# Patient Record
Sex: MALE | Race: WHITE | NOT HISPANIC OR LATINO | Employment: OTHER | URBAN - METROPOLITAN AREA
[De-identification: names, ages, dates, MRNs, and addresses within clinical notes are randomized per-mention and may not be internally consistent; named-entity substitution may affect disease eponyms.]

---

## 2021-01-13 ENCOUNTER — HOSPITAL ENCOUNTER (EMERGENCY)
Facility: HOSPITAL | Age: 71
Discharge: HOME | End: 2021-01-14
Attending: STUDENT IN AN ORGANIZED HEALTH CARE EDUCATION/TRAINING PROGRAM
Payer: MEDICARE

## 2021-01-13 DIAGNOSIS — R60.0 PERIPHERAL EDEMA: ICD-10-CM

## 2021-01-13 DIAGNOSIS — R06.02 SOB (SHORTNESS OF BREATH): ICD-10-CM

## 2021-01-13 DIAGNOSIS — J44.1 COPD EXACERBATION: Primary | ICD-10-CM

## 2021-01-13 LAB
ALBUMIN SERPL BCP-MCNC: 3.4 G/DL (ref 3.5–5.2)
ALP SERPL-CCNC: 104 U/L (ref 55–135)
ALT SERPL W/O P-5'-P-CCNC: 33 U/L (ref 10–44)
ANION GAP SERPL CALC-SCNC: 11 MMOL/L (ref 8–16)
AST SERPL-CCNC: 37 U/L (ref 10–40)
BASOPHILS # BLD AUTO: 0.03 K/UL (ref 0–0.2)
BASOPHILS NFR BLD: 0.5 % (ref 0–1.9)
BILIRUB SERPL-MCNC: 0.5 MG/DL (ref 0.1–1)
BNP SERPL-MCNC: 44 PG/ML (ref 0–99)
BUN SERPL-MCNC: 18 MG/DL (ref 8–23)
CALCIUM SERPL-MCNC: 9 MG/DL (ref 8.7–10.5)
CHLORIDE SERPL-SCNC: 106 MMOL/L (ref 95–110)
CO2 SERPL-SCNC: 24 MMOL/L (ref 23–29)
CREAT SERPL-MCNC: 1.1 MG/DL (ref 0.5–1.4)
D DIMER PPP IA.FEU-MCNC: 0.92 MG/L FEU
DIFFERENTIAL METHOD: ABNORMAL
EOSINOPHIL # BLD AUTO: 0.1 K/UL (ref 0–0.5)
EOSINOPHIL NFR BLD: 1.4 % (ref 0–8)
ERYTHROCYTE [DISTWIDTH] IN BLOOD BY AUTOMATED COUNT: 15.3 % (ref 11.5–14.5)
EST. GFR  (AFRICAN AMERICAN): >60 ML/MIN/1.73 M^2
EST. GFR  (NON AFRICAN AMERICAN): >60 ML/MIN/1.73 M^2
GLUCOSE SERPL-MCNC: 101 MG/DL (ref 70–110)
HCT VFR BLD AUTO: 42.1 % (ref 40–54)
HCV AB SERPL QL IA: NEGATIVE
HGB BLD-MCNC: 13.8 G/DL (ref 14–18)
IMM GRANULOCYTES # BLD AUTO: 0.03 K/UL (ref 0–0.04)
IMM GRANULOCYTES NFR BLD AUTO: 0.5 % (ref 0–0.5)
INR PPP: 1 (ref 0.8–1.2)
LYMPHOCYTES # BLD AUTO: 1.3 K/UL (ref 1–4.8)
LYMPHOCYTES NFR BLD: 23.6 % (ref 18–48)
MCH RBC QN AUTO: 29 PG (ref 27–31)
MCHC RBC AUTO-ENTMCNC: 32.8 G/DL (ref 32–36)
MCV RBC AUTO: 88 FL (ref 82–98)
MONOCYTES # BLD AUTO: 0.7 K/UL (ref 0.3–1)
MONOCYTES NFR BLD: 13 % (ref 4–15)
NEUTROPHILS # BLD AUTO: 3.5 K/UL (ref 1.8–7.7)
NEUTROPHILS NFR BLD: 61 % (ref 38–73)
NRBC BLD-RTO: 0 /100 WBC
PLATELET # BLD AUTO: 168 K/UL (ref 150–350)
PMV BLD AUTO: 11.1 FL (ref 9.2–12.9)
POTASSIUM SERPL-SCNC: 4 MMOL/L (ref 3.5–5.1)
PROT SERPL-MCNC: 7.4 G/DL (ref 6–8.4)
PROTHROMBIN TIME: 10.9 SEC (ref 9–12.5)
RBC # BLD AUTO: 4.76 M/UL (ref 4.6–6.2)
SARS-COV-2 RDRP RESP QL NAA+PROBE: NEGATIVE
SODIUM SERPL-SCNC: 141 MMOL/L (ref 136–145)
TROPONIN I SERPL DL<=0.01 NG/ML-MCNC: 0.01 NG/ML (ref 0–0.03)
WBC # BLD AUTO: 5.69 K/UL (ref 3.9–12.7)

## 2021-01-13 PROCEDURE — 94640 AIRWAY INHALATION TREATMENT: CPT

## 2021-01-13 PROCEDURE — 63600175 PHARM REV CODE 636 W HCPCS: Performed by: STUDENT IN AN ORGANIZED HEALTH CARE EDUCATION/TRAINING PROGRAM

## 2021-01-13 PROCEDURE — 93010 EKG 12-LEAD: ICD-10-PCS | Mod: ,,, | Performed by: INTERNAL MEDICINE

## 2021-01-13 PROCEDURE — 93010 ELECTROCARDIOGRAM REPORT: CPT | Mod: ,,, | Performed by: INTERNAL MEDICINE

## 2021-01-13 PROCEDURE — 25000242 PHARM REV CODE 250 ALT 637 W/ HCPCS: Performed by: STUDENT IN AN ORGANIZED HEALTH CARE EDUCATION/TRAINING PROGRAM

## 2021-01-13 PROCEDURE — 86803 HEPATITIS C AB TEST: CPT

## 2021-01-13 PROCEDURE — 80053 COMPREHEN METABOLIC PANEL: CPT

## 2021-01-13 PROCEDURE — 85025 COMPLETE CBC W/AUTO DIFF WBC: CPT

## 2021-01-13 PROCEDURE — 93005 ELECTROCARDIOGRAM TRACING: CPT

## 2021-01-13 PROCEDURE — U0002 COVID-19 LAB TEST NON-CDC: HCPCS

## 2021-01-13 PROCEDURE — 99285 EMERGENCY DEPT VISIT HI MDM: CPT | Mod: 25

## 2021-01-13 PROCEDURE — 84484 ASSAY OF TROPONIN QUANT: CPT

## 2021-01-13 PROCEDURE — 96375 TX/PRO/DX INJ NEW DRUG ADDON: CPT | Mod: 59

## 2021-01-13 PROCEDURE — 85379 FIBRIN DEGRADATION QUANT: CPT

## 2021-01-13 PROCEDURE — 85610 PROTHROMBIN TIME: CPT

## 2021-01-13 PROCEDURE — 96374 THER/PROPH/DIAG INJ IV PUSH: CPT

## 2021-01-13 PROCEDURE — 25000003 PHARM REV CODE 250: Performed by: STUDENT IN AN ORGANIZED HEALTH CARE EDUCATION/TRAINING PROGRAM

## 2021-01-13 PROCEDURE — 83880 ASSAY OF NATRIURETIC PEPTIDE: CPT

## 2021-01-13 RX ORDER — DILTIAZEM HYDROCHLORIDE 5 MG/ML
20 INJECTION INTRAVENOUS
Status: COMPLETED | OUTPATIENT
Start: 2021-01-13 | End: 2021-01-13

## 2021-01-13 RX ORDER — FUROSEMIDE 10 MG/ML
80 INJECTION INTRAMUSCULAR; INTRAVENOUS
Status: COMPLETED | OUTPATIENT
Start: 2021-01-13 | End: 2021-01-13

## 2021-01-13 RX ORDER — METHYLPREDNISOLONE SOD SUCC 125 MG
125 VIAL (EA) INJECTION
Status: COMPLETED | OUTPATIENT
Start: 2021-01-13 | End: 2021-01-13

## 2021-01-13 RX ORDER — IPRATROPIUM BROMIDE AND ALBUTEROL SULFATE 2.5; .5 MG/3ML; MG/3ML
3 SOLUTION RESPIRATORY (INHALATION)
Status: COMPLETED | OUTPATIENT
Start: 2021-01-13 | End: 2021-01-13

## 2021-01-13 RX ADMIN — METHYLPREDNISOLONE SODIUM SUCCINATE 125 MG: 125 INJECTION, POWDER, FOR SOLUTION INTRAMUSCULAR; INTRAVENOUS at 08:01

## 2021-01-13 RX ADMIN — FUROSEMIDE 80 MG: 10 INJECTION, SOLUTION INTRAMUSCULAR; INTRAVENOUS at 08:01

## 2021-01-13 RX ADMIN — DILTIAZEM HYDROCHLORIDE 20 MG: 5 INJECTION INTRAVENOUS at 08:01

## 2021-01-13 RX ADMIN — IPRATROPIUM BROMIDE AND ALBUTEROL SULFATE 3 ML: .5; 3 SOLUTION RESPIRATORY (INHALATION) at 08:01

## 2021-01-14 VITALS
SYSTOLIC BLOOD PRESSURE: 114 MMHG | BODY MASS INDEX: 36.77 KG/M2 | HEIGHT: 72 IN | RESPIRATION RATE: 18 BRPM | HEART RATE: 80 BPM | DIASTOLIC BLOOD PRESSURE: 70 MMHG | TEMPERATURE: 98 F | WEIGHT: 271.5 LBS | OXYGEN SATURATION: 95 %

## 2021-01-14 PROCEDURE — 25500020 PHARM REV CODE 255: Performed by: STUDENT IN AN ORGANIZED HEALTH CARE EDUCATION/TRAINING PROGRAM

## 2021-01-14 RX ORDER — AZITHROMYCIN 250 MG/1
250 TABLET, FILM COATED ORAL DAILY
Qty: 6 TABLET | Refills: 0 | Status: SHIPPED | OUTPATIENT
Start: 2021-01-14 | End: 2021-09-15

## 2021-01-14 RX ORDER — FUROSEMIDE 20 MG/1
20 TABLET ORAL 2 TIMES DAILY
Qty: 60 TABLET | Refills: 2 | Status: SHIPPED | OUTPATIENT
Start: 2021-01-14 | End: 2021-09-15

## 2021-01-14 RX ADMIN — IOHEXOL 100 ML: 350 INJECTION, SOLUTION INTRAVENOUS at 12:01

## 2021-07-01 ENCOUNTER — HOSPITAL ENCOUNTER (OUTPATIENT)
Dept: RADIOLOGY | Facility: HOSPITAL | Age: 71
Discharge: HOME | End: 2021-07-01
Attending: SPECIALIST
Payer: MEDICARE

## 2021-07-01 DIAGNOSIS — J44.9 COPD (CHRONIC OBSTRUCTIVE PULMONARY DISEASE): ICD-10-CM

## 2021-07-01 PROCEDURE — 71046 XR CHEST PA AND LATERAL: ICD-10-PCS | Mod: 26,,, | Performed by: RADIOLOGY

## 2021-07-01 PROCEDURE — 71046 X-RAY EXAM CHEST 2 VIEWS: CPT | Mod: TC

## 2021-07-01 PROCEDURE — 71046 X-RAY EXAM CHEST 2 VIEWS: CPT | Mod: 26,,, | Performed by: RADIOLOGY

## 2021-08-31 ENCOUNTER — HOSPITAL ENCOUNTER (EMERGENCY)
Facility: HOSPITAL | Age: 71
Discharge: HOME | End: 2021-08-31
Attending: EMERGENCY MEDICINE
Payer: MEDICARE

## 2021-08-31 VITALS
HEART RATE: 75 BPM | SYSTOLIC BLOOD PRESSURE: 167 MMHG | HEIGHT: 72 IN | RESPIRATION RATE: 18 BRPM | OXYGEN SATURATION: 98 % | DIASTOLIC BLOOD PRESSURE: 77 MMHG | TEMPERATURE: 99 F | BODY MASS INDEX: 36.82 KG/M2

## 2021-08-31 DIAGNOSIS — K59.00 CONSTIPATION: Primary | ICD-10-CM

## 2021-08-31 PROCEDURE — 99283 EMERGENCY DEPT VISIT LOW MDM: CPT | Mod: 25

## 2021-08-31 PROCEDURE — 25000003 PHARM REV CODE 250: Performed by: EMERGENCY MEDICINE

## 2021-08-31 RX ORDER — PSEUDOEPHEDRINE/ACETAMINOPHEN 30MG-500MG
100 TABLET ORAL
Status: COMPLETED | OUTPATIENT
Start: 2021-08-31 | End: 2021-08-31

## 2021-08-31 RX ORDER — SYRING-NEEDL,DISP,INSUL,0.3 ML 29 G X1/2"
296 SYRINGE, EMPTY DISPOSABLE MISCELLANEOUS
Status: COMPLETED | OUTPATIENT
Start: 2021-08-31 | End: 2021-08-31

## 2021-08-31 RX ORDER — LACTULOSE 10 G/15ML
20 SOLUTION ORAL; RECTAL 3 TIMES DAILY
Qty: 450 ML | Refills: 0 | Status: SHIPPED | OUTPATIENT
Start: 2021-08-31 | End: 2021-09-05

## 2021-08-31 RX ADMIN — SODIUM CHLORIDE 500 ML: 0.9 INJECTION, SOLUTION INTRAVENOUS at 09:08

## 2021-08-31 RX ADMIN — MAGNESIUM CITRATE 296 ML: 1.75 LIQUID ORAL at 09:08

## 2021-08-31 RX ADMIN — Medication 100 ML: at 09:08

## 2021-09-14 ENCOUNTER — OFFICE VISIT (OUTPATIENT)
Dept: INTERNAL MEDICINE | Facility: CLINIC | Age: 71
End: 2021-09-14
Payer: MEDICARE

## 2021-09-14 ENCOUNTER — LAB VISIT (OUTPATIENT)
Dept: LAB | Facility: HOSPITAL | Age: 71
End: 2021-09-14
Attending: FAMILY MEDICINE
Payer: MEDICARE

## 2021-09-14 VITALS
BODY MASS INDEX: 33.94 KG/M2 | SYSTOLIC BLOOD PRESSURE: 130 MMHG | HEART RATE: 91 BPM | OXYGEN SATURATION: 96 % | HEIGHT: 72 IN | TEMPERATURE: 98 F | WEIGHT: 250.56 LBS | DIASTOLIC BLOOD PRESSURE: 90 MMHG

## 2021-09-14 DIAGNOSIS — E03.9 HYPOTHYROIDISM, UNSPECIFIED TYPE: Primary | ICD-10-CM

## 2021-09-14 DIAGNOSIS — E03.9 HYPOTHYROIDISM, UNSPECIFIED TYPE: ICD-10-CM

## 2021-09-14 DIAGNOSIS — I73.9 PVD (PERIPHERAL VASCULAR DISEASE): ICD-10-CM

## 2021-09-14 DIAGNOSIS — K59.00 CONSTIPATION, ACUTE: ICD-10-CM

## 2021-09-14 PROBLEM — J44.9 CHRONIC OBSTRUCTIVE PULMONARY DISEASE: Status: ACTIVE | Noted: 2021-09-14

## 2021-09-14 PROBLEM — E78.2 MIXED HYPERLIPIDEMIA: Status: ACTIVE | Noted: 2021-09-14

## 2021-09-14 PROBLEM — Z72.0 TOBACCO USER: Status: ACTIVE | Noted: 2021-09-14

## 2021-09-14 PROBLEM — E66.01 MORBID OBESITY: Status: ACTIVE | Noted: 2021-09-14

## 2021-09-14 PROCEDURE — 3008F BODY MASS INDEX DOCD: CPT | Mod: CPTII,S$GLB,, | Performed by: FAMILY MEDICINE

## 2021-09-14 PROCEDURE — 3080F PR MOST RECENT DIASTOLIC BLOOD PRESSURE >= 90 MM HG: ICD-10-PCS | Mod: CPTII,S$GLB,, | Performed by: FAMILY MEDICINE

## 2021-09-14 PROCEDURE — 36415 COLL VENOUS BLD VENIPUNCTURE: CPT | Performed by: FAMILY MEDICINE

## 2021-09-14 PROCEDURE — 99999 PR PBB SHADOW E&M-EST. PATIENT-LVL V: ICD-10-PCS | Mod: PBBFAC,,, | Performed by: FAMILY MEDICINE

## 2021-09-14 PROCEDURE — 1101F PR PT FALLS ASSESS DOC 0-1 FALLS W/OUT INJ PAST YR: ICD-10-PCS | Mod: CPTII,S$GLB,, | Performed by: FAMILY MEDICINE

## 2021-09-14 PROCEDURE — 3075F SYST BP GE 130 - 139MM HG: CPT | Mod: CPTII,S$GLB,, | Performed by: FAMILY MEDICINE

## 2021-09-14 PROCEDURE — 84443 ASSAY THYROID STIM HORMONE: CPT | Performed by: FAMILY MEDICINE

## 2021-09-14 PROCEDURE — 3075F PR MOST RECENT SYSTOLIC BLOOD PRESS GE 130-139MM HG: ICD-10-PCS | Mod: CPTII,S$GLB,, | Performed by: FAMILY MEDICINE

## 2021-09-14 PROCEDURE — 1101F PT FALLS ASSESS-DOCD LE1/YR: CPT | Mod: CPTII,S$GLB,, | Performed by: FAMILY MEDICINE

## 2021-09-14 PROCEDURE — 3288F FALL RISK ASSESSMENT DOCD: CPT | Mod: CPTII,S$GLB,, | Performed by: FAMILY MEDICINE

## 2021-09-14 PROCEDURE — 3288F PR FALLS RISK ASSESSMENT DOCUMENTED: ICD-10-PCS | Mod: CPTII,S$GLB,, | Performed by: FAMILY MEDICINE

## 2021-09-14 PROCEDURE — 3080F DIAST BP >= 90 MM HG: CPT | Mod: CPTII,S$GLB,, | Performed by: FAMILY MEDICINE

## 2021-09-14 PROCEDURE — 84439 ASSAY OF FREE THYROXINE: CPT | Performed by: FAMILY MEDICINE

## 2021-09-14 PROCEDURE — 1125F AMNT PAIN NOTED PAIN PRSNT: CPT | Mod: CPTII,S$GLB,, | Performed by: FAMILY MEDICINE

## 2021-09-14 PROCEDURE — 99203 PR OFFICE/OUTPT VISIT, NEW, LEVL III, 30-44 MIN: ICD-10-PCS | Mod: S$GLB,,, | Performed by: FAMILY MEDICINE

## 2021-09-14 PROCEDURE — 99999 PR PBB SHADOW E&M-EST. PATIENT-LVL V: CPT | Mod: PBBFAC,,, | Performed by: FAMILY MEDICINE

## 2021-09-14 PROCEDURE — 3008F PR BODY MASS INDEX (BMI) DOCUMENTED: ICD-10-PCS | Mod: CPTII,S$GLB,, | Performed by: FAMILY MEDICINE

## 2021-09-14 PROCEDURE — 1125F PR PAIN SEVERITY QUANTIFIED, PAIN PRESENT: ICD-10-PCS | Mod: CPTII,S$GLB,, | Performed by: FAMILY MEDICINE

## 2021-09-14 PROCEDURE — 99203 OFFICE O/P NEW LOW 30 MIN: CPT | Mod: S$GLB,,, | Performed by: FAMILY MEDICINE

## 2021-09-14 RX ORDER — ASPIRIN 81 MG/1
81 TABLET ORAL
COMMUNITY
End: 2021-09-15

## 2021-09-14 RX ORDER — LEVOTHYROXINE SODIUM 25 UG/1
TABLET ORAL
COMMUNITY
Start: 2021-07-02 | End: 2021-09-14 | Stop reason: SDUPTHER

## 2021-09-14 RX ORDER — LEVOTHYROXINE SODIUM 25 UG/1
25 TABLET ORAL
COMMUNITY
Start: 2021-07-02 | End: 2023-04-28 | Stop reason: SDUPTHER

## 2021-09-15 ENCOUNTER — OFFICE VISIT (OUTPATIENT)
Dept: GASTROENTEROLOGY | Facility: CLINIC | Age: 71
End: 2021-09-15
Payer: MEDICARE

## 2021-09-15 VITALS
DIASTOLIC BLOOD PRESSURE: 82 MMHG | OXYGEN SATURATION: 97 % | WEIGHT: 252.88 LBS | HEIGHT: 72 IN | HEART RATE: 88 BPM | BODY MASS INDEX: 34.25 KG/M2 | SYSTOLIC BLOOD PRESSURE: 136 MMHG

## 2021-09-15 DIAGNOSIS — K59.00 CONSTIPATION, ACUTE: ICD-10-CM

## 2021-09-15 LAB
T4 FREE SERPL-MCNC: 0.83 NG/DL (ref 0.71–1.51)
TSH SERPL DL<=0.005 MIU/L-ACNC: 3.42 UIU/ML (ref 0.4–4)

## 2021-09-15 PROCEDURE — 1101F PR PT FALLS ASSESS DOC 0-1 FALLS W/OUT INJ PAST YR: ICD-10-PCS | Mod: CPTII,S$GLB,, | Performed by: PHYSICIAN ASSISTANT

## 2021-09-15 PROCEDURE — 3288F FALL RISK ASSESSMENT DOCD: CPT | Mod: CPTII,S$GLB,, | Performed by: PHYSICIAN ASSISTANT

## 2021-09-15 PROCEDURE — 99203 OFFICE O/P NEW LOW 30 MIN: CPT | Mod: S$GLB,,, | Performed by: PHYSICIAN ASSISTANT

## 2021-09-15 PROCEDURE — 3079F DIAST BP 80-89 MM HG: CPT | Mod: CPTII,S$GLB,, | Performed by: PHYSICIAN ASSISTANT

## 2021-09-15 PROCEDURE — 1101F PT FALLS ASSESS-DOCD LE1/YR: CPT | Mod: CPTII,S$GLB,, | Performed by: PHYSICIAN ASSISTANT

## 2021-09-15 PROCEDURE — 3079F PR MOST RECENT DIASTOLIC BLOOD PRESSURE 80-89 MM HG: ICD-10-PCS | Mod: CPTII,S$GLB,, | Performed by: PHYSICIAN ASSISTANT

## 2021-09-15 PROCEDURE — 1159F PR MEDICATION LIST DOCUMENTED IN MEDICAL RECORD: ICD-10-PCS | Mod: CPTII,S$GLB,, | Performed by: PHYSICIAN ASSISTANT

## 2021-09-15 PROCEDURE — 3075F SYST BP GE 130 - 139MM HG: CPT | Mod: CPTII,S$GLB,, | Performed by: PHYSICIAN ASSISTANT

## 2021-09-15 PROCEDURE — 3008F BODY MASS INDEX DOCD: CPT | Mod: CPTII,S$GLB,, | Performed by: PHYSICIAN ASSISTANT

## 2021-09-15 PROCEDURE — 3288F PR FALLS RISK ASSESSMENT DOCUMENTED: ICD-10-PCS | Mod: CPTII,S$GLB,, | Performed by: PHYSICIAN ASSISTANT

## 2021-09-15 PROCEDURE — 1159F MED LIST DOCD IN RCRD: CPT | Mod: CPTII,S$GLB,, | Performed by: PHYSICIAN ASSISTANT

## 2021-09-15 PROCEDURE — 3008F PR BODY MASS INDEX (BMI) DOCUMENTED: ICD-10-PCS | Mod: CPTII,S$GLB,, | Performed by: PHYSICIAN ASSISTANT

## 2021-09-15 PROCEDURE — 99999 PR PBB SHADOW E&M-EST. PATIENT-LVL III: ICD-10-PCS | Mod: PBBFAC,,, | Performed by: PHYSICIAN ASSISTANT

## 2021-09-15 PROCEDURE — 99999 PR PBB SHADOW E&M-EST. PATIENT-LVL III: CPT | Mod: PBBFAC,,, | Performed by: PHYSICIAN ASSISTANT

## 2021-09-15 PROCEDURE — 3075F PR MOST RECENT SYSTOLIC BLOOD PRESS GE 130-139MM HG: ICD-10-PCS | Mod: CPTII,S$GLB,, | Performed by: PHYSICIAN ASSISTANT

## 2021-09-15 PROCEDURE — 99203 PR OFFICE/OUTPT VISIT, NEW, LEVL III, 30-44 MIN: ICD-10-PCS | Mod: S$GLB,,, | Performed by: PHYSICIAN ASSISTANT

## 2021-09-15 RX ORDER — TAMSULOSIN HYDROCHLORIDE 0.4 MG/1
0.4 CAPSULE ORAL
COMMUNITY
Start: 2021-09-15 | End: 2023-04-28 | Stop reason: SDUPTHER

## 2021-09-15 RX ORDER — POLYETHYLENE GLYCOL 3350, SODIUM SULFATE ANHYDROUS, SODIUM BICARBONATE, SODIUM CHLORIDE, POTASSIUM CHLORIDE 236; 22.74; 6.74; 5.86; 2.97 G/4L; G/4L; G/4L; G/4L; G/4L
4 POWDER, FOR SOLUTION ORAL ONCE
Qty: 4000 ML | Refills: 0 | Status: SHIPPED | OUTPATIENT
Start: 2021-09-15 | End: 2021-09-15

## 2021-09-15 RX ORDER — CEPHALEXIN 500 MG/1
500 CAPSULE ORAL
COMMUNITY
Start: 2021-09-15 | End: 2021-09-20

## 2021-09-18 ENCOUNTER — NURSE TRIAGE (OUTPATIENT)
Dept: ADMINISTRATIVE | Facility: CLINIC | Age: 71
End: 2021-09-18

## 2021-09-18 PROBLEM — K59.00 CONSTIPATION, ACUTE: Status: ACTIVE | Noted: 2021-09-18

## 2021-10-04 DIAGNOSIS — I73.9 PVD (PERIPHERAL VASCULAR DISEASE): Primary | ICD-10-CM

## 2021-10-13 ENCOUNTER — CLINICAL SUPPORT (OUTPATIENT)
Dept: CARDIOLOGY | Facility: CLINIC | Age: 71
End: 2021-10-13
Payer: MEDICARE

## 2021-10-13 ENCOUNTER — OFFICE VISIT (OUTPATIENT)
Dept: CARDIOLOGY | Facility: CLINIC | Age: 71
End: 2021-10-13
Payer: MEDICARE

## 2021-10-13 VITALS
BODY MASS INDEX: 33.67 KG/M2 | HEART RATE: 95 BPM | SYSTOLIC BLOOD PRESSURE: 108 MMHG | WEIGHT: 248.25 LBS | DIASTOLIC BLOOD PRESSURE: 60 MMHG | OXYGEN SATURATION: 95 %

## 2021-10-13 DIAGNOSIS — E78.2 MIXED HYPERLIPIDEMIA: ICD-10-CM

## 2021-10-13 DIAGNOSIS — Z72.0 TOBACCO USER: ICD-10-CM

## 2021-10-13 DIAGNOSIS — I73.9 PVD (PERIPHERAL VASCULAR DISEASE): Primary | ICD-10-CM

## 2021-10-13 DIAGNOSIS — I49.1 PAC (PREMATURE ATRIAL CONTRACTION): ICD-10-CM

## 2021-10-13 DIAGNOSIS — R60.0 LOCALIZED EDEMA: ICD-10-CM

## 2021-10-13 DIAGNOSIS — E66.01 MORBID OBESITY: ICD-10-CM

## 2021-10-13 DIAGNOSIS — I73.9 PVD (PERIPHERAL VASCULAR DISEASE): ICD-10-CM

## 2021-10-13 PROCEDURE — 1126F AMNT PAIN NOTED NONE PRSNT: CPT | Mod: CPTII,S$GLB,, | Performed by: INTERNAL MEDICINE

## 2021-10-13 PROCEDURE — 93005 ELECTROCARDIOGRAM TRACING: CPT | Mod: S$GLB,,, | Performed by: INTERNAL MEDICINE

## 2021-10-13 PROCEDURE — 3074F PR MOST RECENT SYSTOLIC BLOOD PRESSURE < 130 MM HG: ICD-10-PCS | Mod: CPTII,S$GLB,, | Performed by: INTERNAL MEDICINE

## 2021-10-13 PROCEDURE — 93010 ELECTROCARDIOGRAM REPORT: CPT | Mod: S$GLB,,, | Performed by: INTERNAL MEDICINE

## 2021-10-13 PROCEDURE — 1160F RVW MEDS BY RX/DR IN RCRD: CPT | Mod: CPTII,S$GLB,, | Performed by: INTERNAL MEDICINE

## 2021-10-13 PROCEDURE — 99999 PR PBB SHADOW E&M-EST. PATIENT-LVL III: ICD-10-PCS | Mod: PBBFAC,,, | Performed by: INTERNAL MEDICINE

## 2021-10-13 PROCEDURE — 3078F DIAST BP <80 MM HG: CPT | Mod: CPTII,S$GLB,, | Performed by: INTERNAL MEDICINE

## 2021-10-13 PROCEDURE — 1126F PR PAIN SEVERITY QUANTIFIED, NO PAIN PRESENT: ICD-10-PCS | Mod: CPTII,S$GLB,, | Performed by: INTERNAL MEDICINE

## 2021-10-13 PROCEDURE — 99204 PR OFFICE/OUTPT VISIT, NEW, LEVL IV, 45-59 MIN: ICD-10-PCS | Mod: S$GLB,,, | Performed by: INTERNAL MEDICINE

## 2021-10-13 PROCEDURE — 99204 OFFICE O/P NEW MOD 45 MIN: CPT | Mod: S$GLB,,, | Performed by: INTERNAL MEDICINE

## 2021-10-13 PROCEDURE — 3074F SYST BP LT 130 MM HG: CPT | Mod: CPTII,S$GLB,, | Performed by: INTERNAL MEDICINE

## 2021-10-13 PROCEDURE — 3008F BODY MASS INDEX DOCD: CPT | Mod: CPTII,S$GLB,, | Performed by: INTERNAL MEDICINE

## 2021-10-13 PROCEDURE — 3078F PR MOST RECENT DIASTOLIC BLOOD PRESSURE < 80 MM HG: ICD-10-PCS | Mod: CPTII,S$GLB,, | Performed by: INTERNAL MEDICINE

## 2021-10-13 PROCEDURE — 1159F MED LIST DOCD IN RCRD: CPT | Mod: CPTII,S$GLB,, | Performed by: INTERNAL MEDICINE

## 2021-10-13 PROCEDURE — 93010 EKG 12-LEAD: ICD-10-PCS | Mod: S$GLB,,, | Performed by: INTERNAL MEDICINE

## 2021-10-13 PROCEDURE — 3008F PR BODY MASS INDEX (BMI) DOCUMENTED: ICD-10-PCS | Mod: CPTII,S$GLB,, | Performed by: INTERNAL MEDICINE

## 2021-10-13 PROCEDURE — 93005 EKG 12-LEAD: ICD-10-PCS | Mod: S$GLB,,, | Performed by: INTERNAL MEDICINE

## 2021-10-13 PROCEDURE — 99999 PR PBB SHADOW E&M-EST. PATIENT-LVL III: CPT | Mod: PBBFAC,,, | Performed by: INTERNAL MEDICINE

## 2021-10-13 PROCEDURE — 1159F PR MEDICATION LIST DOCUMENTED IN MEDICAL RECORD: ICD-10-PCS | Mod: CPTII,S$GLB,, | Performed by: INTERNAL MEDICINE

## 2021-10-13 PROCEDURE — 1160F PR REVIEW ALL MEDS BY PRESCRIBER/CLIN PHARMACIST DOCUMENTED: ICD-10-PCS | Mod: CPTII,S$GLB,, | Performed by: INTERNAL MEDICINE

## 2021-10-14 ENCOUNTER — LAB VISIT (OUTPATIENT)
Dept: LAB | Facility: HOSPITAL | Age: 71
End: 2021-10-14
Attending: FAMILY MEDICINE
Payer: MEDICARE

## 2021-10-14 ENCOUNTER — OFFICE VISIT (OUTPATIENT)
Dept: INTERNAL MEDICINE | Facility: CLINIC | Age: 71
End: 2021-10-14
Payer: MEDICARE

## 2021-10-14 VITALS
HEIGHT: 72 IN | TEMPERATURE: 97 F | SYSTOLIC BLOOD PRESSURE: 140 MMHG | WEIGHT: 242.06 LBS | HEART RATE: 100 BPM | OXYGEN SATURATION: 95 % | DIASTOLIC BLOOD PRESSURE: 70 MMHG | BODY MASS INDEX: 32.79 KG/M2

## 2021-10-14 DIAGNOSIS — Z23 NEED FOR 23-POLYVALENT PNEUMOCOCCAL POLYSACCHARIDE VACCINE: ICD-10-CM

## 2021-10-14 DIAGNOSIS — Z13.6 ENCOUNTER FOR ABDOMINAL AORTIC ANEURYSM (AAA) SCREENING: ICD-10-CM

## 2021-10-14 DIAGNOSIS — N17.9 AKI (ACUTE KIDNEY INJURY): Primary | ICD-10-CM

## 2021-10-14 DIAGNOSIS — N17.9 AKI (ACUTE KIDNEY INJURY): ICD-10-CM

## 2021-10-14 DIAGNOSIS — R33.9 URINARY RETENTION: ICD-10-CM

## 2021-10-14 DIAGNOSIS — Z97.8 FOLEY CATHETER IN PLACE: ICD-10-CM

## 2021-10-14 DIAGNOSIS — D64.9 ANEMIA, UNSPECIFIED TYPE: ICD-10-CM

## 2021-10-14 DIAGNOSIS — Z12.11 COLON CANCER SCREENING: ICD-10-CM

## 2021-10-14 LAB
BASOPHILS # BLD AUTO: 0.05 K/UL (ref 0–0.2)
BASOPHILS NFR BLD: 0.7 % (ref 0–1.9)
DIFFERENTIAL METHOD: ABNORMAL
EOSINOPHIL # BLD AUTO: 0.1 K/UL (ref 0–0.5)
EOSINOPHIL NFR BLD: 1.3 % (ref 0–8)
ERYTHROCYTE [DISTWIDTH] IN BLOOD BY AUTOMATED COUNT: 16.5 % (ref 11.5–14.5)
HCT VFR BLD AUTO: 39.3 % (ref 40–54)
HGB BLD-MCNC: 12.8 G/DL (ref 14–18)
IMM GRANULOCYTES # BLD AUTO: 0.04 K/UL (ref 0–0.04)
IMM GRANULOCYTES NFR BLD AUTO: 0.6 % (ref 0–0.5)
LYMPHOCYTES # BLD AUTO: 1.6 K/UL (ref 1–4.8)
LYMPHOCYTES NFR BLD: 22.4 % (ref 18–48)
MCH RBC QN AUTO: 29.8 PG (ref 27–31)
MCHC RBC AUTO-ENTMCNC: 32.6 G/DL (ref 32–36)
MCV RBC AUTO: 91 FL (ref 82–98)
MONOCYTES # BLD AUTO: 0.8 K/UL (ref 0.3–1)
MONOCYTES NFR BLD: 11.8 % (ref 4–15)
NEUTROPHILS # BLD AUTO: 4.5 K/UL (ref 1.8–7.7)
NEUTROPHILS NFR BLD: 63.2 % (ref 38–73)
NRBC BLD-RTO: 0 /100 WBC
PLATELET # BLD AUTO: 196 K/UL (ref 150–450)
PMV BLD AUTO: 11.2 FL (ref 9.2–12.9)
RBC # BLD AUTO: 4.3 M/UL (ref 4.6–6.2)
WBC # BLD AUTO: 7.06 K/UL (ref 3.9–12.7)

## 2021-10-14 PROCEDURE — 1126F PR PAIN SEVERITY QUANTIFIED, NO PAIN PRESENT: ICD-10-PCS | Mod: CPTII,S$GLB,, | Performed by: FAMILY MEDICINE

## 2021-10-14 PROCEDURE — 1101F PR PT FALLS ASSESS DOC 0-1 FALLS W/OUT INJ PAST YR: ICD-10-PCS | Mod: CPTII,S$GLB,, | Performed by: FAMILY MEDICINE

## 2021-10-14 PROCEDURE — 3078F PR MOST RECENT DIASTOLIC BLOOD PRESSURE < 80 MM HG: ICD-10-PCS | Mod: CPTII,S$GLB,, | Performed by: FAMILY MEDICINE

## 2021-10-14 PROCEDURE — 3078F DIAST BP <80 MM HG: CPT | Mod: CPTII,S$GLB,, | Performed by: FAMILY MEDICINE

## 2021-10-14 PROCEDURE — 82728 ASSAY OF FERRITIN: CPT | Performed by: FAMILY MEDICINE

## 2021-10-14 PROCEDURE — 99214 PR OFFICE/OUTPT VISIT, EST, LEVL IV, 30-39 MIN: ICD-10-PCS | Mod: S$GLB,,, | Performed by: FAMILY MEDICINE

## 2021-10-14 PROCEDURE — 36415 COLL VENOUS BLD VENIPUNCTURE: CPT | Performed by: FAMILY MEDICINE

## 2021-10-14 PROCEDURE — 99999 PR PBB SHADOW E&M-EST. PATIENT-LVL IV: ICD-10-PCS | Mod: PBBFAC,,, | Performed by: FAMILY MEDICINE

## 2021-10-14 PROCEDURE — G0008 ADMIN INFLUENZA VIRUS VAC: HCPCS | Mod: S$GLB,,, | Performed by: FAMILY MEDICINE

## 2021-10-14 PROCEDURE — 85025 COMPLETE CBC W/AUTO DIFF WBC: CPT | Performed by: FAMILY MEDICINE

## 2021-10-14 PROCEDURE — G0009 PNEUMOCOCCAL POLYSACCHARIDE VACCINE 23-VALENT =>2YO SQ IM: ICD-10-PCS | Mod: S$GLB,,, | Performed by: FAMILY MEDICINE

## 2021-10-14 PROCEDURE — 3008F BODY MASS INDEX DOCD: CPT | Mod: CPTII,S$GLB,, | Performed by: FAMILY MEDICINE

## 2021-10-14 PROCEDURE — 99999 PR PBB SHADOW E&M-EST. PATIENT-LVL IV: CPT | Mod: PBBFAC,,, | Performed by: FAMILY MEDICINE

## 2021-10-14 PROCEDURE — 1159F PR MEDICATION LIST DOCUMENTED IN MEDICAL RECORD: ICD-10-PCS | Mod: CPTII,S$GLB,, | Performed by: FAMILY MEDICINE

## 2021-10-14 PROCEDURE — 3077F PR MOST RECENT SYSTOLIC BLOOD PRESSURE >= 140 MM HG: ICD-10-PCS | Mod: CPTII,S$GLB,, | Performed by: FAMILY MEDICINE

## 2021-10-14 PROCEDURE — 1126F AMNT PAIN NOTED NONE PRSNT: CPT | Mod: CPTII,S$GLB,, | Performed by: FAMILY MEDICINE

## 2021-10-14 PROCEDURE — 99214 OFFICE O/P EST MOD 30 MIN: CPT | Mod: S$GLB,,, | Performed by: FAMILY MEDICINE

## 2021-10-14 PROCEDURE — 3288F PR FALLS RISK ASSESSMENT DOCUMENTED: ICD-10-PCS | Mod: CPTII,S$GLB,, | Performed by: FAMILY MEDICINE

## 2021-10-14 PROCEDURE — 3077F SYST BP >= 140 MM HG: CPT | Mod: CPTII,S$GLB,, | Performed by: FAMILY MEDICINE

## 2021-10-14 PROCEDURE — 1159F MED LIST DOCD IN RCRD: CPT | Mod: CPTII,S$GLB,, | Performed by: FAMILY MEDICINE

## 2021-10-14 PROCEDURE — G0008 FLU VACCINE - QUADRIVALENT - ADJUVANTED: ICD-10-PCS | Mod: S$GLB,,, | Performed by: FAMILY MEDICINE

## 2021-10-14 PROCEDURE — 3288F FALL RISK ASSESSMENT DOCD: CPT | Mod: CPTII,S$GLB,, | Performed by: FAMILY MEDICINE

## 2021-10-14 PROCEDURE — 90732 PPSV23 VACC 2 YRS+ SUBQ/IM: CPT | Mod: S$GLB,,, | Performed by: FAMILY MEDICINE

## 2021-10-14 PROCEDURE — 1101F PT FALLS ASSESS-DOCD LE1/YR: CPT | Mod: CPTII,S$GLB,, | Performed by: FAMILY MEDICINE

## 2021-10-14 PROCEDURE — 3008F PR BODY MASS INDEX (BMI) DOCUMENTED: ICD-10-PCS | Mod: CPTII,S$GLB,, | Performed by: FAMILY MEDICINE

## 2021-10-14 PROCEDURE — G0009 ADMIN PNEUMOCOCCAL VACCINE: HCPCS | Mod: S$GLB,,, | Performed by: FAMILY MEDICINE

## 2021-10-14 PROCEDURE — 90694 VACC AIIV4 NO PRSRV 0.5ML IM: CPT | Mod: S$GLB,,, | Performed by: FAMILY MEDICINE

## 2021-10-14 PROCEDURE — 80048 BASIC METABOLIC PNL TOTAL CA: CPT | Performed by: FAMILY MEDICINE

## 2021-10-14 PROCEDURE — 84466 ASSAY OF TRANSFERRIN: CPT | Performed by: FAMILY MEDICINE

## 2021-10-14 PROCEDURE — 90732 PNEUMOCOCCAL POLYSACCHARIDE VACCINE 23-VALENT =>2YO SQ IM: ICD-10-PCS | Mod: S$GLB,,, | Performed by: FAMILY MEDICINE

## 2021-10-14 PROCEDURE — 90694 FLU VACCINE - QUADRIVALENT - ADJUVANTED: ICD-10-PCS | Mod: S$GLB,,, | Performed by: FAMILY MEDICINE

## 2021-10-15 DIAGNOSIS — D50.9 IRON DEFICIENCY ANEMIA, UNSPECIFIED IRON DEFICIENCY ANEMIA TYPE: ICD-10-CM

## 2021-10-15 DIAGNOSIS — R79.89 ELEVATED SERUM CREATININE: Primary | ICD-10-CM

## 2021-10-15 LAB
ANION GAP SERPL CALC-SCNC: 11 MMOL/L (ref 8–16)
BUN SERPL-MCNC: 38 MG/DL (ref 8–23)
CALCIUM SERPL-MCNC: 10.2 MG/DL (ref 8.7–10.5)
CHLORIDE SERPL-SCNC: 105 MMOL/L (ref 95–110)
CO2 SERPL-SCNC: 25 MMOL/L (ref 23–29)
CREAT SERPL-MCNC: 2.8 MG/DL (ref 0.5–1.4)
EST. GFR  (AFRICAN AMERICAN): 25.1 ML/MIN/1.73 M^2
EST. GFR  (NON AFRICAN AMERICAN): 21.7 ML/MIN/1.73 M^2
FERRITIN SERPL-MCNC: 466 NG/ML (ref 20–300)
GLUCOSE SERPL-MCNC: 85 MG/DL (ref 70–110)
IRON SERPL-MCNC: 57 UG/DL (ref 45–160)
POTASSIUM SERPL-SCNC: 5 MMOL/L (ref 3.5–5.1)
SATURATED IRON: 16 % (ref 20–50)
SODIUM SERPL-SCNC: 141 MMOL/L (ref 136–145)
TOTAL IRON BINDING CAPACITY: 364 UG/DL (ref 250–450)
TRANSFERRIN SERPL-MCNC: 246 MG/DL (ref 200–375)

## 2021-10-17 PROBLEM — R33.9 URINARY RETENTION: Status: ACTIVE | Noted: 2021-10-17

## 2021-10-17 PROBLEM — N17.9 AKI (ACUTE KIDNEY INJURY): Status: ACTIVE | Noted: 2021-10-17

## 2021-10-17 PROBLEM — Z97.8 FOLEY CATHETER IN PLACE: Status: ACTIVE | Noted: 2021-10-17

## 2021-10-17 PROBLEM — D64.9 ANEMIA: Status: ACTIVE | Noted: 2021-10-17

## 2021-10-18 ENCOUNTER — PATIENT OUTREACH (OUTPATIENT)
Dept: ADMINISTRATIVE | Facility: OTHER | Age: 71
End: 2021-10-18

## 2021-10-19 ENCOUNTER — OFFICE VISIT (OUTPATIENT)
Dept: NEPHROLOGY | Facility: CLINIC | Age: 71
End: 2021-10-19
Payer: MEDICARE

## 2021-10-19 VITALS
RESPIRATION RATE: 18 BRPM | HEART RATE: 88 BPM | DIASTOLIC BLOOD PRESSURE: 70 MMHG | HEIGHT: 72 IN | BODY MASS INDEX: 33.77 KG/M2 | SYSTOLIC BLOOD PRESSURE: 115 MMHG | WEIGHT: 249.31 LBS

## 2021-10-19 DIAGNOSIS — N13.9 OBSTRUCTIVE UROPATHY: ICD-10-CM

## 2021-10-19 DIAGNOSIS — N17.9 AKI (ACUTE KIDNEY INJURY): Primary | ICD-10-CM

## 2021-10-19 DIAGNOSIS — R79.89 ELEVATED SERUM CREATININE: ICD-10-CM

## 2021-10-19 PROCEDURE — 3078F DIAST BP <80 MM HG: CPT | Mod: CPTII,S$GLB,, | Performed by: INTERNAL MEDICINE

## 2021-10-19 PROCEDURE — 1160F PR REVIEW ALL MEDS BY PRESCRIBER/CLIN PHARMACIST DOCUMENTED: ICD-10-PCS | Mod: CPTII,S$GLB,, | Performed by: INTERNAL MEDICINE

## 2021-10-19 PROCEDURE — 99204 PR OFFICE/OUTPT VISIT, NEW, LEVL IV, 45-59 MIN: ICD-10-PCS | Mod: S$GLB,,, | Performed by: INTERNAL MEDICINE

## 2021-10-19 PROCEDURE — 3066F NEPHROPATHY DOC TX: CPT | Mod: CPTII,S$GLB,, | Performed by: INTERNAL MEDICINE

## 2021-10-19 PROCEDURE — 3078F PR MOST RECENT DIASTOLIC BLOOD PRESSURE < 80 MM HG: ICD-10-PCS | Mod: CPTII,S$GLB,, | Performed by: INTERNAL MEDICINE

## 2021-10-19 PROCEDURE — 1101F PR PT FALLS ASSESS DOC 0-1 FALLS W/OUT INJ PAST YR: ICD-10-PCS | Mod: CPTII,S$GLB,, | Performed by: INTERNAL MEDICINE

## 2021-10-19 PROCEDURE — 99204 OFFICE O/P NEW MOD 45 MIN: CPT | Mod: S$GLB,,, | Performed by: INTERNAL MEDICINE

## 2021-10-19 PROCEDURE — 3008F PR BODY MASS INDEX (BMI) DOCUMENTED: ICD-10-PCS | Mod: CPTII,S$GLB,, | Performed by: INTERNAL MEDICINE

## 2021-10-19 PROCEDURE — 1159F PR MEDICATION LIST DOCUMENTED IN MEDICAL RECORD: ICD-10-PCS | Mod: CPTII,S$GLB,, | Performed by: INTERNAL MEDICINE

## 2021-10-19 PROCEDURE — 1125F AMNT PAIN NOTED PAIN PRSNT: CPT | Mod: CPTII,S$GLB,, | Performed by: INTERNAL MEDICINE

## 2021-10-19 PROCEDURE — 1125F PR PAIN SEVERITY QUANTIFIED, PAIN PRESENT: ICD-10-PCS | Mod: CPTII,S$GLB,, | Performed by: INTERNAL MEDICINE

## 2021-10-19 PROCEDURE — 1159F MED LIST DOCD IN RCRD: CPT | Mod: CPTII,S$GLB,, | Performed by: INTERNAL MEDICINE

## 2021-10-19 PROCEDURE — 1160F RVW MEDS BY RX/DR IN RCRD: CPT | Mod: CPTII,S$GLB,, | Performed by: INTERNAL MEDICINE

## 2021-10-19 PROCEDURE — 3074F PR MOST RECENT SYSTOLIC BLOOD PRESSURE < 130 MM HG: ICD-10-PCS | Mod: CPTII,S$GLB,, | Performed by: INTERNAL MEDICINE

## 2021-10-19 PROCEDURE — 99999 PR PBB SHADOW E&M-EST. PATIENT-LVL III: CPT | Mod: PBBFAC,,, | Performed by: INTERNAL MEDICINE

## 2021-10-19 PROCEDURE — 3074F SYST BP LT 130 MM HG: CPT | Mod: CPTII,S$GLB,, | Performed by: INTERNAL MEDICINE

## 2021-10-19 PROCEDURE — 3066F PR DOCUMENTATION OF TREATMENT FOR NEPHROPATHY: ICD-10-PCS | Mod: CPTII,S$GLB,, | Performed by: INTERNAL MEDICINE

## 2021-10-19 PROCEDURE — 3288F FALL RISK ASSESSMENT DOCD: CPT | Mod: CPTII,S$GLB,, | Performed by: INTERNAL MEDICINE

## 2021-10-19 PROCEDURE — 1101F PT FALLS ASSESS-DOCD LE1/YR: CPT | Mod: CPTII,S$GLB,, | Performed by: INTERNAL MEDICINE

## 2021-10-19 PROCEDURE — 3288F PR FALLS RISK ASSESSMENT DOCUMENTED: ICD-10-PCS | Mod: CPTII,S$GLB,, | Performed by: INTERNAL MEDICINE

## 2021-10-19 PROCEDURE — 3008F BODY MASS INDEX DOCD: CPT | Mod: CPTII,S$GLB,, | Performed by: INTERNAL MEDICINE

## 2021-10-19 PROCEDURE — 99999 PR PBB SHADOW E&M-EST. PATIENT-LVL III: ICD-10-PCS | Mod: PBBFAC,,, | Performed by: INTERNAL MEDICINE

## 2021-10-19 RX ORDER — POLYETHYLENE GLYCOL 3350, SODIUM SULFATE ANHYDROUS, SODIUM BICARBONATE, SODIUM CHLORIDE, POTASSIUM CHLORIDE 236; 22.74; 6.74; 5.86; 2.97 G/4L; G/4L; G/4L; G/4L; G/4L
POWDER, FOR SOLUTION ORAL
COMMUNITY
Start: 2021-09-27 | End: 2023-04-20 | Stop reason: ALTCHOICE

## 2021-10-21 ENCOUNTER — HOSPITAL ENCOUNTER (OUTPATIENT)
Dept: CARDIOLOGY | Facility: HOSPITAL | Age: 71
Discharge: HOME | End: 2021-10-21
Attending: INTERNAL MEDICINE
Payer: MEDICARE

## 2021-10-21 VITALS
HEIGHT: 72 IN | BODY MASS INDEX: 33.72 KG/M2 | DIASTOLIC BLOOD PRESSURE: 70 MMHG | SYSTOLIC BLOOD PRESSURE: 115 MMHG | WEIGHT: 249 LBS

## 2021-10-21 VITALS — HEIGHT: 72 IN | BODY MASS INDEX: 32.78 KG/M2 | WEIGHT: 242 LBS

## 2021-10-21 VITALS
WEIGHT: 242 LBS | HEIGHT: 72 IN | BODY MASS INDEX: 32.78 KG/M2 | BODY MASS INDEX: 32.78 KG/M2 | HEIGHT: 72 IN | WEIGHT: 242 LBS

## 2021-10-21 DIAGNOSIS — R60.0 LOCALIZED EDEMA: ICD-10-CM

## 2021-10-21 DIAGNOSIS — I73.9 PVD (PERIPHERAL VASCULAR DISEASE): ICD-10-CM

## 2021-10-21 LAB
LEFT ABI: 1.16
LEFT ANT TIBIAL SYS PSV: 58 CM/S
LEFT ARM BP: 115 MMHG
LEFT CFA PSV: 193 CM/S
LEFT DORSALIS PEDIS: 61 MMHG
LEFT PERONEAL SYS PSV: 44 CM/S
LEFT POPLITEAL PSV: 55 CM/S
LEFT POST TIBIAL SYS PSV: 54 CM/S
LEFT POSTERIOR TIBIAL: 133 MMHG
LEFT PROFUNDA SYS PSV: 86 CM/S
LEFT SUPER FEMORAL DIST SYS PSV: 92 CM/S
LEFT SUPER FEMORAL MID SYS PSV: 110 CM/S
LEFT SUPER FEMORAL OSTIAL SYS PSV: 122 CM/S
LEFT SUPER FEMORAL PROX SYS PSV: 91 CM/S
LEFT TBI: 0.7
LEFT TIB/PER TRUNK SYS PSV: 68 CM/S
LEFT TOE PRESSURE: 81 MMHG
OHS CV LEFT LOWER EXTREMITY ABI (NO CALC): 1.16
OHS CV RIGHT ABI LOWER EXTREMITY (NO CALC): 1.07
RIGHT ABI: 1.07
RIGHT ANT TIBIAL SYS PSV: 64 CM/S
RIGHT ARM BP: 108 MMHG
RIGHT CFA PSV: 190 CM/S
RIGHT DORSALIS PEDIS: 123 MMHG
RIGHT PERONEAL SYS PSV: 55 CM/S
RIGHT POPLITEAL PSV: 61 CM/S
RIGHT POST TIBIAL SYS PSV: 55 CM/S
RIGHT POSTERIOR TIBIAL: 115 MMHG
RIGHT PROFUNDA SYS PSV: 178 CM/S
RIGHT SUPER FEMORAL DIST SYS PSV: 86 CM/S
RIGHT SUPER FEMORAL MID SYS PSV: 90 CM/S
RIGHT SUPER FEMORAL OSTIAL SYS PSV: 233 CM/S
RIGHT SUPER FEMORAL PROX SYS PSV: 82 CM/S
RIGHT TBI: 0.27
RIGHT TIB/PER TRUNK SYS PSV: 49 CM/S
RIGHT TOE PRESSURE: 31 MMHG

## 2021-10-21 PROCEDURE — 93925 CV US DOPPLER ARTERIAL LEGS BILATERAL (CUPID ONLY): ICD-10-PCS | Mod: 26,,, | Performed by: INTERNAL MEDICINE

## 2021-10-21 PROCEDURE — 93925 LOWER EXTREMITY STUDY: CPT

## 2021-10-21 PROCEDURE — 93306 TTE W/DOPPLER COMPLETE: CPT | Mod: 26,,, | Performed by: INTERNAL MEDICINE

## 2021-10-21 PROCEDURE — 93925 LOWER EXTREMITY STUDY: CPT | Mod: 26,,, | Performed by: INTERNAL MEDICINE

## 2021-10-21 PROCEDURE — 93970 EXTREMITY STUDY: CPT | Mod: 26,,, | Performed by: INTERNAL MEDICINE

## 2021-10-21 PROCEDURE — 93922 ANKLE BRACHIAL INDICES (ABI): ICD-10-PCS | Mod: 26,,, | Performed by: INTERNAL MEDICINE

## 2021-10-21 PROCEDURE — 93922 UPR/L XTREMITY ART 2 LEVELS: CPT | Mod: 26,,, | Performed by: INTERNAL MEDICINE

## 2021-10-21 PROCEDURE — 93970 CV US DOPPLER VENOUS LEGS BILATERAL (CUPID ONLY): ICD-10-PCS | Mod: 26,,, | Performed by: INTERNAL MEDICINE

## 2021-10-21 PROCEDURE — 93922 UPR/L XTREMITY ART 2 LEVELS: CPT

## 2021-10-21 PROCEDURE — 93306 ECHO (CUPID ONLY): ICD-10-PCS | Mod: 26,,, | Performed by: INTERNAL MEDICINE

## 2021-10-21 PROCEDURE — 93970 EXTREMITY STUDY: CPT | Mod: 50

## 2021-10-21 PROCEDURE — 93306 TTE W/DOPPLER COMPLETE: CPT

## 2021-10-22 ENCOUNTER — TELEPHONE (OUTPATIENT)
Dept: INTERNAL MEDICINE | Facility: CLINIC | Age: 71
End: 2021-10-22

## 2021-10-22 LAB
AORTIC ROOT ANNULUS: 3.42 CM
AV INDEX (PROSTH): 0.76
AV MEAN GRADIENT: 3 MMHG
AV PEAK GRADIENT: 5 MMHG
AV VALVE AREA: 4.7 CM2
AV VELOCITY RATIO: 0.65
BSA FOR ECHO PROCEDURE: 2.36 M2
CV ECHO LV RWT: 0.47 CM
DOP CALC AO PEAK VEL: 1.16 M/S
DOP CALC AO VTI: 20.41 CM
DOP CALC LVOT AREA: 6.2 CM2
DOP CALC LVOT DIAMETER: 2.81 CM
DOP CALC LVOT PEAK VEL: 0.75 M/S
DOP CALC LVOT STROKE VOLUME: 95.89 CM3
DOP CALC RVOT PEAK VEL: 0.68 M/S
DOP CALC RVOT VTI: 12.25 CM
DOP CALCLVOT PEAK VEL VTI: 15.47 CM
E WAVE DECELERATION TIME: 351.86 MSEC
E/A RATIO: 0.54
E/E' RATIO: 8.43 M/S
ECHO LV POSTERIOR WALL: 1.28 CM (ref 0.6–1.1)
EJECTION FRACTION: 40 %
FRACTIONAL SHORTENING: 7 % (ref 28–44)
INTERVENTRICULAR SEPTUM: 1.52 CM (ref 0.6–1.1)
LA MAJOR: 6.51 CM
LA MINOR: 5.56 CM
LA WIDTH: 3.8 CM
LEFT ATRIUM SIZE: 4.9 CM
LEFT ATRIUM VOLUME INDEX: 41.1 ML/M2
LEFT ATRIUM VOLUME: 94.92 CM3
LEFT INTERNAL DIMENSION IN SYSTOLE: 5.08 CM (ref 2.1–4)
LEFT VENTRICLE DIASTOLIC VOLUME INDEX: 62.58 ML/M2
LEFT VENTRICLE DIASTOLIC VOLUME: 144.57 ML
LEFT VENTRICLE MASS INDEX: 144 G/M2
LEFT VENTRICLE SYSTOLIC VOLUME INDEX: 53 ML/M2
LEFT VENTRICLE SYSTOLIC VOLUME: 122.44 ML
LEFT VENTRICULAR INTERNAL DIMENSION IN DIASTOLE: 5.45 CM (ref 3.5–6)
LEFT VENTRICULAR MASS: 333.1 G
LV LATERAL E/E' RATIO: 7.38 M/S
LV SEPTAL E/E' RATIO: 9.83 M/S
MV PEAK A VEL: 1.1 M/S
MV PEAK E VEL: 0.59 M/S
MV STENOSIS PRESSURE HALF TIME: 102.04 MS
MV VALVE AREA P 1/2 METHOD: 2.16 CM2
PISA TR MAX VEL: 2.96 M/S
PULM VEIN S/D RATIO: 2.2
PV MEAN GRADIENT: 1 MMHG
PV PEAK D VEL: 0.46 M/S
PV PEAK S VEL: 1.01 M/S
PV PEAK VELOCITY: 1.12 CM/S
RA MAJOR: 4.9 CM
RA PRESSURE: 3 MMHG
RA WIDTH: 3.47 CM
RIGHT VENTRICULAR END-DIASTOLIC DIMENSION: 4.03 CM
SINUS: 3.24 CM
STJ: 3.26 CM
TDI LATERAL: 0.08 M/S
TDI SEPTAL: 0.06 M/S
TDI: 0.07 M/S
TR MAX PG: 35 MMHG
TRICUSPID ANNULAR PLANE SYSTOLIC EXCURSION: 1.85 CM
TV REST PULMONARY ARTERY PRESSURE: 38 MMHG

## 2021-10-25 ENCOUNTER — TELEPHONE (OUTPATIENT)
Dept: CARDIOLOGY | Facility: CLINIC | Age: 71
End: 2021-10-25
Payer: MEDICARE

## 2021-10-26 ENCOUNTER — TELEPHONE (OUTPATIENT)
Dept: CARDIOLOGY | Facility: CLINIC | Age: 71
End: 2021-10-26
Payer: MEDICARE

## 2021-11-04 ENCOUNTER — OFFICE VISIT (OUTPATIENT)
Dept: CARDIOLOGY | Facility: CLINIC | Age: 71
End: 2021-11-04
Payer: MEDICARE

## 2021-11-04 ENCOUNTER — HOSPITAL ENCOUNTER (EMERGENCY)
Facility: HOSPITAL | Age: 71
Discharge: HOME | End: 2021-11-04
Attending: EMERGENCY MEDICINE
Payer: MEDICARE

## 2021-11-04 VITALS
DIASTOLIC BLOOD PRESSURE: 104 MMHG | OXYGEN SATURATION: 95 % | WEIGHT: 243.81 LBS | BODY MASS INDEX: 33.07 KG/M2 | HEART RATE: 80 BPM | SYSTOLIC BLOOD PRESSURE: 142 MMHG

## 2021-11-04 VITALS
TEMPERATURE: 99 F | HEART RATE: 69 BPM | SYSTOLIC BLOOD PRESSURE: 132 MMHG | HEIGHT: 72 IN | WEIGHT: 245.31 LBS | OXYGEN SATURATION: 96 % | BODY MASS INDEX: 33.23 KG/M2 | DIASTOLIC BLOOD PRESSURE: 68 MMHG | RESPIRATION RATE: 20 BRPM

## 2021-11-04 DIAGNOSIS — N17.9 AKI (ACUTE KIDNEY INJURY): ICD-10-CM

## 2021-11-04 DIAGNOSIS — R33.9 URINARY RETENTION: Primary | ICD-10-CM

## 2021-11-04 DIAGNOSIS — E78.2 MIXED HYPERLIPIDEMIA: ICD-10-CM

## 2021-11-04 DIAGNOSIS — I10 PRIMARY HYPERTENSION: ICD-10-CM

## 2021-11-04 DIAGNOSIS — Z72.0 TOBACCO USER: ICD-10-CM

## 2021-11-04 DIAGNOSIS — R94.31 NONSPECIFIC ABNORMAL ELECTROCARDIOGRAM (ECG) (EKG): ICD-10-CM

## 2021-11-04 DIAGNOSIS — E66.01 MORBID OBESITY: ICD-10-CM

## 2021-11-04 DIAGNOSIS — I50.22 CHRONIC SYSTOLIC CONGESTIVE HEART FAILURE: Primary | ICD-10-CM

## 2021-11-04 LAB
ALBUMIN SERPL BCP-MCNC: 3.2 G/DL (ref 3.5–5.2)
ALP SERPL-CCNC: 130 U/L (ref 55–135)
ALT SERPL W/O P-5'-P-CCNC: 28 U/L (ref 10–44)
ANION GAP SERPL CALC-SCNC: 9 MMOL/L (ref 8–16)
AST SERPL-CCNC: 19 U/L (ref 10–40)
BACTERIA #/AREA URNS HPF: ABNORMAL /HPF
BASOPHILS # BLD AUTO: 0.03 K/UL (ref 0–0.2)
BASOPHILS NFR BLD: 0.5 % (ref 0–1.9)
BILIRUB SERPL-MCNC: 0.3 MG/DL (ref 0.1–1)
BILIRUB UR QL STRIP: NEGATIVE
BUN SERPL-MCNC: 27 MG/DL (ref 8–23)
CALCIUM SERPL-MCNC: 9.9 MG/DL (ref 8.7–10.5)
CHLORIDE SERPL-SCNC: 105 MMOL/L (ref 95–110)
CLARITY UR: CLEAR
CO2 SERPL-SCNC: 25 MMOL/L (ref 23–29)
COLOR UR: YELLOW
CREAT SERPL-MCNC: 2.1 MG/DL (ref 0.5–1.4)
DIFFERENTIAL METHOD: ABNORMAL
EOSINOPHIL # BLD AUTO: 0.1 K/UL (ref 0–0.5)
EOSINOPHIL NFR BLD: 1.4 % (ref 0–8)
ERYTHROCYTE [DISTWIDTH] IN BLOOD BY AUTOMATED COUNT: 15.7 % (ref 11.5–14.5)
EST. GFR  (AFRICAN AMERICAN): 36 ML/MIN/1.73 M^2
EST. GFR  (NON AFRICAN AMERICAN): 31 ML/MIN/1.73 M^2
GLUCOSE SERPL-MCNC: 94 MG/DL (ref 70–110)
GLUCOSE UR QL STRIP: NEGATIVE
HCT VFR BLD AUTO: 40.1 % (ref 40–54)
HCV AB SERPL QL IA: NEGATIVE
HEP C VIRUS HOLD SPECIMEN: NORMAL
HGB BLD-MCNC: 12.9 G/DL (ref 14–18)
HGB UR QL STRIP: ABNORMAL
HYALINE CASTS #/AREA URNS LPF: 0 /LPF
IMM GRANULOCYTES # BLD AUTO: 0.06 K/UL (ref 0–0.04)
IMM GRANULOCYTES NFR BLD AUTO: 0.9 % (ref 0–0.5)
KETONES UR QL STRIP: NEGATIVE
LEUKOCYTE ESTERASE UR QL STRIP: NEGATIVE
LYMPHOCYTES # BLD AUTO: 1.4 K/UL (ref 1–4.8)
LYMPHOCYTES NFR BLD: 21.1 % (ref 18–48)
MCH RBC QN AUTO: 29.2 PG (ref 27–31)
MCHC RBC AUTO-ENTMCNC: 32.2 G/DL (ref 32–36)
MCV RBC AUTO: 91 FL (ref 82–98)
MICROSCOPIC COMMENT: ABNORMAL
MONOCYTES # BLD AUTO: 0.7 K/UL (ref 0.3–1)
MONOCYTES NFR BLD: 10.1 % (ref 4–15)
NEUTROPHILS # BLD AUTO: 4.3 K/UL (ref 1.8–7.7)
NEUTROPHILS NFR BLD: 66 % (ref 38–73)
NITRITE UR QL STRIP: NEGATIVE
NRBC BLD-RTO: 0 /100 WBC
PH UR STRIP: 7 [PH] (ref 5–8)
PLATELET # BLD AUTO: 195 K/UL (ref 150–450)
PMV BLD AUTO: 10.6 FL (ref 9.2–12.9)
POTASSIUM SERPL-SCNC: 4.4 MMOL/L (ref 3.5–5.1)
PROT SERPL-MCNC: 7.4 G/DL (ref 6–8.4)
PROT UR QL STRIP: ABNORMAL
RBC # BLD AUTO: 4.42 M/UL (ref 4.6–6.2)
RBC #/AREA URNS HPF: 15 /HPF (ref 0–4)
SODIUM SERPL-SCNC: 139 MMOL/L (ref 136–145)
SP GR UR STRIP: 1.02 (ref 1–1.03)
URN SPEC COLLECT METH UR: ABNORMAL
UROBILINOGEN UR STRIP-ACNC: NEGATIVE EU/DL
WBC # BLD AUTO: 6.54 K/UL (ref 3.9–12.7)
WBC #/AREA URNS HPF: 1 /HPF (ref 0–5)

## 2021-11-04 PROCEDURE — 3008F PR BODY MASS INDEX (BMI) DOCUMENTED: ICD-10-PCS | Mod: CPTII,S$GLB,, | Performed by: INTERNAL MEDICINE

## 2021-11-04 PROCEDURE — 1159F MED LIST DOCD IN RCRD: CPT | Mod: CPTII,S$GLB,, | Performed by: INTERNAL MEDICINE

## 2021-11-04 PROCEDURE — 80053 COMPREHEN METABOLIC PANEL: CPT | Performed by: EMERGENCY MEDICINE

## 2021-11-04 PROCEDURE — 3077F PR MOST RECENT SYSTOLIC BLOOD PRESSURE >= 140 MM HG: ICD-10-PCS | Mod: CPTII,S$GLB,, | Performed by: INTERNAL MEDICINE

## 2021-11-04 PROCEDURE — 3080F PR MOST RECENT DIASTOLIC BLOOD PRESSURE >= 90 MM HG: ICD-10-PCS | Mod: CPTII,S$GLB,, | Performed by: INTERNAL MEDICINE

## 2021-11-04 PROCEDURE — 99999 PR PBB SHADOW E&M-EST. PATIENT-LVL III: ICD-10-PCS | Mod: PBBFAC,,, | Performed by: INTERNAL MEDICINE

## 2021-11-04 PROCEDURE — 3077F SYST BP >= 140 MM HG: CPT | Mod: CPTII,S$GLB,, | Performed by: INTERNAL MEDICINE

## 2021-11-04 PROCEDURE — 3080F DIAST BP >= 90 MM HG: CPT | Mod: CPTII,S$GLB,, | Performed by: INTERNAL MEDICINE

## 2021-11-04 PROCEDURE — 3066F NEPHROPATHY DOC TX: CPT | Mod: CPTII,S$GLB,, | Performed by: INTERNAL MEDICINE

## 2021-11-04 PROCEDURE — 81000 URINALYSIS NONAUTO W/SCOPE: CPT | Performed by: EMERGENCY MEDICINE

## 2021-11-04 PROCEDURE — 99215 PR OFFICE/OUTPT VISIT, EST, LEVL V, 40-54 MIN: ICD-10-PCS | Mod: S$GLB,,, | Performed by: INTERNAL MEDICINE

## 2021-11-04 PROCEDURE — 1160F PR REVIEW ALL MEDS BY PRESCRIBER/CLIN PHARMACIST DOCUMENTED: ICD-10-PCS | Mod: CPTII,S$GLB,, | Performed by: INTERNAL MEDICINE

## 2021-11-04 PROCEDURE — 51702 INSERT TEMP BLADDER CATH: CPT

## 2021-11-04 PROCEDURE — 99215 OFFICE O/P EST HI 40 MIN: CPT | Mod: S$GLB,,, | Performed by: INTERNAL MEDICINE

## 2021-11-04 PROCEDURE — 1160F RVW MEDS BY RX/DR IN RCRD: CPT | Mod: CPTII,S$GLB,, | Performed by: INTERNAL MEDICINE

## 2021-11-04 PROCEDURE — 3008F BODY MASS INDEX DOCD: CPT | Mod: CPTII,S$GLB,, | Performed by: INTERNAL MEDICINE

## 2021-11-04 PROCEDURE — 3066F PR DOCUMENTATION OF TREATMENT FOR NEPHROPATHY: ICD-10-PCS | Mod: CPTII,S$GLB,, | Performed by: INTERNAL MEDICINE

## 2021-11-04 PROCEDURE — 86803 HEPATITIS C AB TEST: CPT | Performed by: EMERGENCY MEDICINE

## 2021-11-04 PROCEDURE — 85025 COMPLETE CBC W/AUTO DIFF WBC: CPT | Performed by: EMERGENCY MEDICINE

## 2021-11-04 PROCEDURE — 99999 PR PBB SHADOW E&M-EST. PATIENT-LVL III: CPT | Mod: PBBFAC,,, | Performed by: INTERNAL MEDICINE

## 2021-11-04 PROCEDURE — 99291 CRITICAL CARE FIRST HOUR: CPT | Mod: 25

## 2021-11-04 PROCEDURE — 1159F PR MEDICATION LIST DOCUMENTED IN MEDICAL RECORD: ICD-10-PCS | Mod: CPTII,S$GLB,, | Performed by: INTERNAL MEDICINE

## 2021-11-04 RX ORDER — AMLODIPINE BESYLATE 5 MG/1
5 TABLET ORAL DAILY
Qty: 30 TABLET | Refills: 11 | Status: SHIPPED | OUTPATIENT
Start: 2021-11-04 | End: 2021-12-15

## 2021-11-04 RX ORDER — ASPIRIN 81 MG/1
81 TABLET ORAL DAILY
Start: 2021-11-04

## 2021-11-12 ENCOUNTER — TELEPHONE (OUTPATIENT)
Dept: PULMONOLOGY | Facility: CLINIC | Age: 71
End: 2021-11-12
Payer: MEDICARE

## 2021-11-12 DIAGNOSIS — J44.9 CHRONIC OBSTRUCTIVE PULMONARY DISEASE, UNSPECIFIED COPD TYPE: Primary | ICD-10-CM

## 2021-11-15 ENCOUNTER — HOSPITAL ENCOUNTER (OUTPATIENT)
Dept: RADIOLOGY | Facility: HOSPITAL | Age: 71
Discharge: HOME | End: 2021-11-15
Attending: INTERNAL MEDICINE
Payer: MEDICARE

## 2021-11-15 ENCOUNTER — OFFICE VISIT (OUTPATIENT)
Dept: PULMONOLOGY | Facility: CLINIC | Age: 71
End: 2021-11-15
Payer: MEDICARE

## 2021-11-15 VITALS
RESPIRATION RATE: 17 BRPM | BODY MASS INDEX: 31.2 KG/M2 | WEIGHT: 230.38 LBS | HEART RATE: 80 BPM | HEIGHT: 72 IN | DIASTOLIC BLOOD PRESSURE: 78 MMHG | SYSTOLIC BLOOD PRESSURE: 120 MMHG | OXYGEN SATURATION: 95 %

## 2021-11-15 DIAGNOSIS — I50.22 CHRONIC SYSTOLIC CONGESTIVE HEART FAILURE: ICD-10-CM

## 2021-11-15 DIAGNOSIS — E03.2 HYPOTHYROIDISM DUE TO NON-MEDICATION EXOGENOUS SUBSTANCES: ICD-10-CM

## 2021-11-15 DIAGNOSIS — Z72.0 TOBACCO USER: ICD-10-CM

## 2021-11-15 DIAGNOSIS — Z77.090 SUSPECTED EXPOSURE TO ASBESTOS: ICD-10-CM

## 2021-11-15 DIAGNOSIS — E78.2 MIXED HYPERLIPIDEMIA: ICD-10-CM

## 2021-11-15 DIAGNOSIS — G47.33 OSA (OBSTRUCTIVE SLEEP APNEA): ICD-10-CM

## 2021-11-15 DIAGNOSIS — J42 CHRONIC BRONCHITIS, UNSPECIFIED CHRONIC BRONCHITIS TYPE: ICD-10-CM

## 2021-11-15 DIAGNOSIS — I10 PRIMARY HYPERTENSION: ICD-10-CM

## 2021-11-15 DIAGNOSIS — R68.3 FINGER CLUBBING: ICD-10-CM

## 2021-11-15 DIAGNOSIS — J44.9 CHRONIC OBSTRUCTIVE PULMONARY DISEASE, UNSPECIFIED COPD TYPE: ICD-10-CM

## 2021-11-15 DIAGNOSIS — J43.2 CENTRILOBULAR EMPHYSEMA: Primary | ICD-10-CM

## 2021-11-15 PROCEDURE — 99999 PR PBB SHADOW E&M-EST. PATIENT-LVL V: CPT | Mod: PBBFAC,,, | Performed by: INTERNAL MEDICINE

## 2021-11-15 PROCEDURE — 3288F PR FALLS RISK ASSESSMENT DOCUMENTED: ICD-10-PCS | Mod: CPTII,S$GLB,, | Performed by: INTERNAL MEDICINE

## 2021-11-15 PROCEDURE — 99205 OFFICE O/P NEW HI 60 MIN: CPT | Mod: S$GLB,,, | Performed by: INTERNAL MEDICINE

## 2021-11-15 PROCEDURE — 99205 PR OFFICE/OUTPT VISIT, NEW, LEVL V, 60-74 MIN: ICD-10-PCS | Mod: S$GLB,,, | Performed by: INTERNAL MEDICINE

## 2021-11-15 PROCEDURE — 3008F BODY MASS INDEX DOCD: CPT | Mod: CPTII,S$GLB,, | Performed by: INTERNAL MEDICINE

## 2021-11-15 PROCEDURE — 71046 X-RAY EXAM CHEST 2 VIEWS: CPT | Mod: TC

## 2021-11-15 PROCEDURE — 3066F NEPHROPATHY DOC TX: CPT | Mod: CPTII,S$GLB,, | Performed by: INTERNAL MEDICINE

## 2021-11-15 PROCEDURE — 1160F RVW MEDS BY RX/DR IN RCRD: CPT | Mod: CPTII,S$GLB,, | Performed by: INTERNAL MEDICINE

## 2021-11-15 PROCEDURE — 1159F PR MEDICATION LIST DOCUMENTED IN MEDICAL RECORD: ICD-10-PCS | Mod: CPTII,S$GLB,, | Performed by: INTERNAL MEDICINE

## 2021-11-15 PROCEDURE — 1101F PR PT FALLS ASSESS DOC 0-1 FALLS W/OUT INJ PAST YR: ICD-10-PCS | Mod: CPTII,S$GLB,, | Performed by: INTERNAL MEDICINE

## 2021-11-15 PROCEDURE — 71046 XR CHEST PA AND LATERAL: ICD-10-PCS | Mod: 26,76,, | Performed by: RADIOLOGY

## 2021-11-15 PROCEDURE — 3078F PR MOST RECENT DIASTOLIC BLOOD PRESSURE < 80 MM HG: ICD-10-PCS | Mod: CPTII,S$GLB,, | Performed by: INTERNAL MEDICINE

## 2021-11-15 PROCEDURE — 71046 X-RAY EXAM CHEST 2 VIEWS: CPT | Mod: 26,,, | Performed by: RADIOLOGY

## 2021-11-15 PROCEDURE — 71046 XR CHEST PA AND LATERAL: ICD-10-PCS | Mod: 26,,, | Performed by: RADIOLOGY

## 2021-11-15 PROCEDURE — 3074F PR MOST RECENT SYSTOLIC BLOOD PRESSURE < 130 MM HG: ICD-10-PCS | Mod: CPTII,S$GLB,, | Performed by: INTERNAL MEDICINE

## 2021-11-15 PROCEDURE — 3078F DIAST BP <80 MM HG: CPT | Mod: CPTII,S$GLB,, | Performed by: INTERNAL MEDICINE

## 2021-11-15 PROCEDURE — 1159F MED LIST DOCD IN RCRD: CPT | Mod: CPTII,S$GLB,, | Performed by: INTERNAL MEDICINE

## 2021-11-15 PROCEDURE — 3074F SYST BP LT 130 MM HG: CPT | Mod: CPTII,S$GLB,, | Performed by: INTERNAL MEDICINE

## 2021-11-15 PROCEDURE — 3008F PR BODY MASS INDEX (BMI) DOCUMENTED: ICD-10-PCS | Mod: CPTII,S$GLB,, | Performed by: INTERNAL MEDICINE

## 2021-11-15 PROCEDURE — 3288F FALL RISK ASSESSMENT DOCD: CPT | Mod: CPTII,S$GLB,, | Performed by: INTERNAL MEDICINE

## 2021-11-15 PROCEDURE — 1101F PT FALLS ASSESS-DOCD LE1/YR: CPT | Mod: CPTII,S$GLB,, | Performed by: INTERNAL MEDICINE

## 2021-11-15 PROCEDURE — 99999 PR PBB SHADOW E&M-EST. PATIENT-LVL V: ICD-10-PCS | Mod: PBBFAC,,, | Performed by: INTERNAL MEDICINE

## 2021-11-15 PROCEDURE — 71046 X-RAY EXAM CHEST 2 VIEWS: CPT | Mod: 26,76,, | Performed by: RADIOLOGY

## 2021-11-15 PROCEDURE — 1160F PR REVIEW ALL MEDS BY PRESCRIBER/CLIN PHARMACIST DOCUMENTED: ICD-10-PCS | Mod: CPTII,S$GLB,, | Performed by: INTERNAL MEDICINE

## 2021-11-15 PROCEDURE — 3066F PR DOCUMENTATION OF TREATMENT FOR NEPHROPATHY: ICD-10-PCS | Mod: CPTII,S$GLB,, | Performed by: INTERNAL MEDICINE

## 2021-11-15 RX ORDER — ALBUTEROL SULFATE 1.25 MG/3ML
1.25 SOLUTION RESPIRATORY (INHALATION) 2 TIMES DAILY
Qty: 540 ML | Refills: 3 | Status: SHIPPED | OUTPATIENT
Start: 2021-11-15 | End: 2022-01-13 | Stop reason: SDUPTHER

## 2021-11-29 ENCOUNTER — HOSPITAL ENCOUNTER (OUTPATIENT)
Dept: SLEEP MEDICINE | Facility: HOSPITAL | Age: 71
Discharge: HOME | End: 2021-11-29
Attending: INTERNAL MEDICINE
Payer: MEDICARE

## 2021-11-29 DIAGNOSIS — G47.31 CENTRAL SLEEP APNEA: ICD-10-CM

## 2021-11-29 DIAGNOSIS — J43.2 CENTRILOBULAR EMPHYSEMA: ICD-10-CM

## 2021-11-29 DIAGNOSIS — G47.33 OSA (OBSTRUCTIVE SLEEP APNEA): ICD-10-CM

## 2021-11-29 PROCEDURE — 95810 PR POLYSOMNOGRAPHY, 4 OR MORE: ICD-10-PCS | Mod: 26,,, | Performed by: INTERNAL MEDICINE

## 2021-11-29 PROCEDURE — 95810 POLYSOM 6/> YRS 4/> PARAM: CPT

## 2021-11-29 PROCEDURE — 95810 POLYSOM 6/> YRS 4/> PARAM: CPT | Mod: 26,,, | Performed by: INTERNAL MEDICINE

## 2021-12-03 ENCOUNTER — PATIENT MESSAGE (OUTPATIENT)
Dept: PULMONOLOGY | Facility: CLINIC | Age: 71
End: 2021-12-03
Payer: MEDICARE

## 2021-12-06 ENCOUNTER — HOSPITAL ENCOUNTER (OUTPATIENT)
Dept: RADIOLOGY | Facility: HOSPITAL | Age: 71
Discharge: HOME | End: 2021-12-06
Attending: INTERNAL MEDICINE
Payer: MEDICARE

## 2021-12-06 ENCOUNTER — HOSPITAL ENCOUNTER (OUTPATIENT)
Dept: CARDIOLOGY | Facility: HOSPITAL | Age: 71
Discharge: HOME | End: 2021-12-06
Attending: INTERNAL MEDICINE
Payer: MEDICARE

## 2021-12-06 DIAGNOSIS — I50.22 CHRONIC SYSTOLIC CONGESTIVE HEART FAILURE: ICD-10-CM

## 2021-12-06 DIAGNOSIS — I10 PRIMARY HYPERTENSION: ICD-10-CM

## 2021-12-06 DIAGNOSIS — Z72.0 TOBACCO USER: ICD-10-CM

## 2021-12-06 PROCEDURE — 93017 CV STRESS TEST TRACING ONLY: CPT

## 2021-12-06 PROCEDURE — 63600175 PHARM REV CODE 636 W HCPCS: Performed by: INTERNAL MEDICINE

## 2021-12-06 PROCEDURE — 78452 HT MUSCLE IMAGE SPECT MULT: CPT

## 2021-12-06 PROCEDURE — 78452 STRESS TEST WITH MYOCARDIAL PERFUSION (CUPID ONLY): ICD-10-PCS | Mod: 26,,, | Performed by: INTERNAL MEDICINE

## 2021-12-06 PROCEDURE — 78452 HT MUSCLE IMAGE SPECT MULT: CPT | Mod: 26,,, | Performed by: INTERNAL MEDICINE

## 2021-12-06 PROCEDURE — 93016 CV STRESS TEST SUPVJ ONLY: CPT | Mod: ,,, | Performed by: INTERNAL MEDICINE

## 2021-12-06 PROCEDURE — 93018 CV STRESS TEST I&R ONLY: CPT | Mod: ,,, | Performed by: INTERNAL MEDICINE

## 2021-12-06 PROCEDURE — A9502 TC99M TETROFOSMIN: HCPCS

## 2021-12-06 PROCEDURE — 93018 STRESS TEST WITH MYOCARDIAL PERFUSION (CUPID ONLY): ICD-10-PCS | Mod: ,,, | Performed by: INTERNAL MEDICINE

## 2021-12-06 PROCEDURE — 93016 STRESS TEST WITH MYOCARDIAL PERFUSION (CUPID ONLY): ICD-10-PCS | Mod: ,,, | Performed by: INTERNAL MEDICINE

## 2021-12-06 RX ORDER — REGADENOSON 0.08 MG/ML
0.4 INJECTION, SOLUTION INTRAVENOUS ONCE
Status: COMPLETED | OUTPATIENT
Start: 2021-12-06 | End: 2021-12-06

## 2021-12-06 RX ADMIN — REGADENOSON 0.4 MG: 0.08 INJECTION, SOLUTION INTRAVENOUS at 09:12

## 2021-12-07 LAB
CV STRESS BASE HR: 91 BPM
DIASTOLIC BLOOD PRESSURE: 73 MMHG
NUC REST EJECTION FRACTION: 48
NUC STRESS EJECTION FRACTION: 34 %
OHS CV CPX 85 PERCENT MAX PREDICTED HEART RATE MALE: 127
OHS CV CPX MAX PREDICTED HEART RATE: 149
OHS CV CPX PATIENT IS FEMALE: 0
OHS CV CPX PATIENT IS MALE: 1
OHS CV CPX PEAK DIASTOLIC BLOOD PRESSURE: 76 MMHG
OHS CV CPX PEAK HEAR RATE: 120 BPM
OHS CV CPX PEAK RATE PRESSURE PRODUCT: NORMAL
OHS CV CPX PEAK SYSTOLIC BLOOD PRESSURE: 141 MMHG
OHS CV CPX PERCENT MAX PREDICTED HEART RATE ACHIEVED: 81
OHS CV CPX RATE PRESSURE PRODUCT PRESENTING: NORMAL
STRESS ECHO POST EXERCISE DUR SEC: 55 SECONDS
SYSTOLIC BLOOD PRESSURE: 141 MMHG

## 2021-12-08 ENCOUNTER — TELEPHONE (OUTPATIENT)
Dept: CARDIOLOGY | Facility: CLINIC | Age: 71
End: 2021-12-08
Payer: MEDICARE

## 2021-12-15 ENCOUNTER — OFFICE VISIT (OUTPATIENT)
Dept: CARDIOLOGY | Facility: CLINIC | Age: 71
End: 2021-12-15
Payer: MEDICARE

## 2021-12-15 VITALS
HEART RATE: 85 BPM | WEIGHT: 228.5 LBS | OXYGEN SATURATION: 95 % | BODY MASS INDEX: 30.99 KG/M2 | DIASTOLIC BLOOD PRESSURE: 95 MMHG | SYSTOLIC BLOOD PRESSURE: 139 MMHG

## 2021-12-15 DIAGNOSIS — I25.118 CORONARY ARTERY DISEASE WITH STABLE ANGINA PECTORIS, UNSPECIFIED VESSEL OR LESION TYPE, UNSPECIFIED WHETHER NATIVE OR TRANSPLANTED HEART: ICD-10-CM

## 2021-12-15 DIAGNOSIS — E78.2 MIXED HYPERLIPIDEMIA: ICD-10-CM

## 2021-12-15 DIAGNOSIS — Z72.0 TOBACCO USER: ICD-10-CM

## 2021-12-15 DIAGNOSIS — I50.22 CHRONIC SYSTOLIC CONGESTIVE HEART FAILURE: Primary | ICD-10-CM

## 2021-12-15 DIAGNOSIS — G47.33 OSA (OBSTRUCTIVE SLEEP APNEA): ICD-10-CM

## 2021-12-15 PROBLEM — I50.32 CHRONIC DIASTOLIC CONGESTIVE HEART FAILURE: Status: ACTIVE | Noted: 2021-11-04

## 2021-12-15 PROCEDURE — 3066F PR DOCUMENTATION OF TREATMENT FOR NEPHROPATHY: ICD-10-PCS | Mod: CPTII,S$GLB,, | Performed by: INTERNAL MEDICINE

## 2021-12-15 PROCEDURE — 4010F ACE/ARB THERAPY RXD/TAKEN: CPT | Mod: CPTII,S$GLB,, | Performed by: INTERNAL MEDICINE

## 2021-12-15 PROCEDURE — 99999 PR PBB SHADOW E&M-EST. PATIENT-LVL III: CPT | Mod: PBBFAC,,, | Performed by: INTERNAL MEDICINE

## 2021-12-15 PROCEDURE — 99214 PR OFFICE/OUTPT VISIT, EST, LEVL IV, 30-39 MIN: ICD-10-PCS | Mod: S$GLB,,, | Performed by: INTERNAL MEDICINE

## 2021-12-15 PROCEDURE — 99214 OFFICE O/P EST MOD 30 MIN: CPT | Mod: S$GLB,,, | Performed by: INTERNAL MEDICINE

## 2021-12-15 PROCEDURE — 3066F NEPHROPATHY DOC TX: CPT | Mod: CPTII,S$GLB,, | Performed by: INTERNAL MEDICINE

## 2021-12-15 PROCEDURE — 4010F PR ACE/ARB THEARPY RXD/TAKEN: ICD-10-PCS | Mod: CPTII,S$GLB,, | Performed by: INTERNAL MEDICINE

## 2021-12-15 PROCEDURE — 99999 PR PBB SHADOW E&M-EST. PATIENT-LVL III: ICD-10-PCS | Mod: PBBFAC,,, | Performed by: INTERNAL MEDICINE

## 2021-12-15 RX ORDER — SACUBITRIL AND VALSARTAN 24; 26 MG/1; MG/1
1 TABLET, FILM COATED ORAL 2 TIMES DAILY
Qty: 60 TABLET | Refills: 5 | Status: SHIPPED | OUTPATIENT
Start: 2021-12-15 | End: 2022-06-10

## 2021-12-15 RX ORDER — ATORVASTATIN CALCIUM 40 MG/1
40 TABLET, FILM COATED ORAL NIGHTLY
Qty: 90 TABLET | Refills: 3 | Status: SHIPPED | OUTPATIENT
Start: 2021-12-15 | End: 2022-12-20

## 2021-12-15 RX ORDER — METOPROLOL SUCCINATE 25 MG/1
25 TABLET, EXTENDED RELEASE ORAL DAILY
Qty: 30 TABLET | Refills: 5 | Status: SHIPPED | OUTPATIENT
Start: 2021-12-15 | End: 2022-06-16

## 2021-12-27 ENCOUNTER — LAB VISIT (OUTPATIENT)
Dept: LAB | Facility: HOSPITAL | Age: 71
End: 2021-12-27
Attending: INTERNAL MEDICINE
Payer: MEDICARE

## 2021-12-27 DIAGNOSIS — I50.22 CHRONIC SYSTOLIC CONGESTIVE HEART FAILURE: ICD-10-CM

## 2021-12-27 PROCEDURE — 80048 BASIC METABOLIC PNL TOTAL CA: CPT | Performed by: INTERNAL MEDICINE

## 2021-12-27 PROCEDURE — 36415 COLL VENOUS BLD VENIPUNCTURE: CPT | Mod: PO | Performed by: INTERNAL MEDICINE

## 2021-12-28 LAB
ANION GAP SERPL CALC-SCNC: 11 MMOL/L (ref 8–16)
BUN SERPL-MCNC: 32 MG/DL (ref 8–23)
CALCIUM SERPL-MCNC: 9.6 MG/DL (ref 8.7–10.5)
CHLORIDE SERPL-SCNC: 103 MMOL/L (ref 95–110)
CO2 SERPL-SCNC: 23 MMOL/L (ref 23–29)
CREAT SERPL-MCNC: 2 MG/DL (ref 0.5–1.4)
EST. GFR  (AFRICAN AMERICAN): 37.7 ML/MIN/1.73 M^2
EST. GFR  (NON AFRICAN AMERICAN): 32.6 ML/MIN/1.73 M^2
GLUCOSE SERPL-MCNC: 94 MG/DL (ref 70–110)
POTASSIUM SERPL-SCNC: 4.5 MMOL/L (ref 3.5–5.1)
SODIUM SERPL-SCNC: 137 MMOL/L (ref 136–145)

## 2021-12-30 ENCOUNTER — TELEPHONE (OUTPATIENT)
Dept: PULMONOLOGY | Facility: CLINIC | Age: 71
End: 2021-12-30
Payer: MEDICARE

## 2022-01-03 ENCOUNTER — TELEPHONE (OUTPATIENT)
Dept: CARDIOLOGY | Facility: CLINIC | Age: 72
End: 2022-01-03
Payer: MEDICARE

## 2022-01-03 NOTE — TELEPHONE ENCOUNTER
Informed patient's daughter of results and informed her that Dr. Wray wants him to continue taking current Rx and follow up as scheduled.

## 2022-01-03 NOTE — TELEPHONE ENCOUNTER
----- Message from Micheal Wray MD sent at 1/2/2022  2:17 PM CST -----  The lab showed kidney function stable  Continue current rx

## 2022-01-04 ENCOUNTER — CLINICAL SUPPORT (OUTPATIENT)
Dept: PULMONOLOGY | Facility: CLINIC | Age: 72
End: 2022-01-04
Payer: MEDICARE

## 2022-01-04 VITALS — WEIGHT: 196.19 LBS | HEIGHT: 72 IN | BODY MASS INDEX: 26.57 KG/M2

## 2022-01-04 DIAGNOSIS — J43.2 CENTRILOBULAR EMPHYSEMA: ICD-10-CM

## 2022-01-04 DIAGNOSIS — G47.33 OSA (OBSTRUCTIVE SLEEP APNEA): ICD-10-CM

## 2022-01-04 DIAGNOSIS — J42 CHRONIC BRONCHITIS, UNSPECIFIED CHRONIC BRONCHITIS TYPE: ICD-10-CM

## 2022-01-04 LAB
ALLENS TEST: ABNORMAL
BRPFT: NORMAL
DELSYS: ABNORMAL
DLCO ADJ PRE: 11.17 ML/(MIN*MMHG)
DLCO SINGLE BREATH LLN: 21.78
DLCO SINGLE BREATH PRE REF: 38.9 %
DLCO SINGLE BREATH REF: 28.71
DLCOC SBVA LLN: 2.72
DLCOC SBVA PRE REF: 75.6 %
DLCOC SBVA REF: 3.81
DLCOC SINGLE BREATH LLN: 21.78
DLCOC SINGLE BREATH PRE REF: 38.9 %
DLCOC SINGLE BREATH REF: 28.71
DLCOVA LLN: 2.72
DLCOVA PRE REF: 75.6 %
DLCOVA PRE: 2.88 ML/(MIN*MMHG*L)
DLCOVA REF: 3.81
DLVAADJ PRE: 2.88 ML/(MIN*MMHG*L)
ERV LLN: -16448.9
ERV PRE REF: 85.3 %
ERV REF: 1.1
FEF 25 75 LLN: 1.07
FEF 25 75 PRE REF: 29.1 %
FEF 25 75 REF: 2.5
FEV1 FVC LLN: 62
FEV1 FVC PRE REF: 70.7 %
FEV1 FVC REF: 75
FEV1 LLN: 2.43
FEV1 PRE REF: 59.5 %
FEV1 REF: 3.38
FIO2: 21
FRCPLETH LLN: 2.84
FRCPLETH PREREF: 85.2 %
FRCPLETH REF: 3.83
FVC LLN: 3.33
FVC PRE REF: 83.6 %
FVC REF: 4.5
HCO3 UR-SCNC: 23 MMOL/L (ref 24–28)
IVC PRE: 2.34 L
IVC SINGLE BREATH LLN: 3.33
IVC SINGLE BREATH PRE REF: 51.9 %
IVC SINGLE BREATH REF: 4.5
MODE: ABNORMAL
MVV LLN: 113
MVV PRE REF: 31.6 %
MVV REF: 133
PCO2 BLDA: 37.4 MMHG (ref 35–45)
PEF LLN: 6.27
PEF PRE REF: 38.7 %
PEF REF: 8.73
PH SMN: 7.4 [PH] (ref 7.35–7.45)
PO2 BLDA: 84 MMHG (ref 80–100)
POC BE: -2 MMOL/L
POC SATURATED O2: 96 % (ref 95–100)
PRE DLCO: 11.17 ML/(MIN*MMHG)
PRE ERV: 0.94 L
PRE FEF 25 75: 0.73 L/S
PRE FET 100: 15.18 SEC
PRE FEV1 FVC: 53.35 %
PRE FEV1: 2.01 L
PRE FRC PL: 3.27 L
PRE FVC: 3.77 L
PRE MVV: 42 L/MIN
PRE PEF: 3.38 L/S
PRE RV: 1.82 L
PRE TLC: 5.79 L
RAW LLN: 3.06
RAW PRE REF: 183.8 %
RAW PRE: 5.62 CMH2O*S/L
RAW REF: 3.06
RV LLN: 2.05
RV PRE REF: 66.8 %
RV REF: 2.73
RVTLC LLN: 33
RVTLC PRE REF: 75.5 %
RVTLC PRE: 31.44 %
RVTLC REF: 42
SAMPLE: ABNORMAL
SITE: ABNORMAL
TLC LLN: 6.39
TLC PRE REF: 76.8 %
TLC REF: 7.54
VA PRE: 3.91 L
VA SINGLE BREATH LLN: 7.39
VA SINGLE BREATH PRE REF: 52.9 %
VA SINGLE BREATH REF: 7.39
VC LLN: 3.33
VC PRE REF: 88.2 %
VC PRE: 3.97 L
VC REF: 4.5
VTGRAWPRE: 3.63 L

## 2022-01-04 PROCEDURE — 94762 PR NONINVASV OXYGEN SATUR, CONT: ICD-10-PCS | Mod: 59,S$GLB,, | Performed by: INTERNAL MEDICINE

## 2022-01-04 PROCEDURE — 94010 BREATHING CAPACITY TEST: ICD-10-PCS | Mod: 59,S$GLB,, | Performed by: INTERNAL MEDICINE

## 2022-01-04 PROCEDURE — 94726 PULM FUNCT TST PLETHYSMOGRAP: ICD-10-PCS | Mod: S$GLB,,, | Performed by: INTERNAL MEDICINE

## 2022-01-04 PROCEDURE — 94729 DIFFUSING CAPACITY: CPT | Mod: S$GLB,,, | Performed by: INTERNAL MEDICINE

## 2022-01-04 PROCEDURE — 36600 PR WITHDRAWAL OF ARTERIAL BLOOD: ICD-10-PCS | Mod: 59,S$GLB,, | Performed by: INTERNAL MEDICINE

## 2022-01-04 PROCEDURE — 82803 PR  BLOOD GASES: PH, PO2 & PCO2: ICD-10-PCS | Mod: S$GLB,,, | Performed by: INTERNAL MEDICINE

## 2022-01-04 PROCEDURE — 94618 PULMONARY STRESS TESTING: ICD-10-PCS | Mod: S$GLB,,, | Performed by: INTERNAL MEDICINE

## 2022-01-04 PROCEDURE — 94618 PULMONARY STRESS TESTING: CPT | Mod: S$GLB,,, | Performed by: INTERNAL MEDICINE

## 2022-01-04 PROCEDURE — 36600 WITHDRAWAL OF ARTERIAL BLOOD: CPT | Mod: 59,S$GLB,, | Performed by: INTERNAL MEDICINE

## 2022-01-04 PROCEDURE — 94726 PLETHYSMOGRAPHY LUNG VOLUMES: CPT | Mod: S$GLB,,, | Performed by: INTERNAL MEDICINE

## 2022-01-04 PROCEDURE — 94729 PR C02/MEMBANE DIFFUSE CAPACITY: ICD-10-PCS | Mod: S$GLB,,, | Performed by: INTERNAL MEDICINE

## 2022-01-04 PROCEDURE — 94010 BREATHING CAPACITY TEST: CPT | Mod: 59,S$GLB,, | Performed by: INTERNAL MEDICINE

## 2022-01-04 PROCEDURE — 94762 N-INVAS EAR/PLS OXIMTRY CONT: CPT | Mod: 59,S$GLB,, | Performed by: INTERNAL MEDICINE

## 2022-01-04 PROCEDURE — 82803 BLOOD GASES ANY COMBINATION: CPT | Mod: S$GLB,,, | Performed by: INTERNAL MEDICINE

## 2022-01-04 NOTE — PROCEDURES
[facility] - Pulmonary Function  Six Minute Walk     SUMMARY     Ordering Provider: Dr. Patel   Interpreting Provider: Dr. Patel  Performing nurse/tech/RT: BRENTON Cabral RRT  Diagnosis: Emphysema (ju[place])  Height: 6' (182.9 cm)  Weight: 89 kg (196 lb 3.4 oz)  BMI (Calculated): 26.6   Patient Race:             Phase Oxygen Assessment Supplemental O2 Heart   Rate Blood Pressure Awa Dyspnea Scale Rating   Resting 98 % Room Air 81 bpm 134/73 1   Exercise        Minute        1 95 % Room Air 99 bpm     2 90 % Room Air 110 bpm     3 96 % Room Air 90 bpm     4 99 % Room Air 100 bpm     5 91 % Room Air 100 bpm     6  93 % Room Air 101 bpm 128/87 4   Recovery        Minute        1 96 % Room Air 98 bpm     2 98 % Room Air 93 bpm     3 97 % Room Air 83 bpm     4 98 % Room Air 81 bpm 145/65 1     Six Minute Walk Summary  6MWT Status: completed without stopping  Patient Reported: Dyspnea     Interpretation:  Did the patient stop or pause?: No         Total Time Walked (Calculated): 360 seconds  Final Partial Lap Distance (feet): 50 feet  Total Distance Meters (Calculated): 137.16 meters  Predicted Distance Meters (Calculated): 562.49 meters  Percentage of Predicted (Calculated): 24.38  Peak VO2 (Calculated): 8.09  Mets: 2.31  Has The Patient Had a Previous Six Minute Walk Test?: No       Previous 6MWT Results  Has The Patient Had a Previous Six Minute Walk Test?: No           CLINICAL INTERPRETATION:  Six minute walk distance is 137.16m (24.38 % predicted) with light dyspnea.  Blood pressure remained stable and Heart rate remained stable with walking.  The patient did complete the study, walking 360 seconds of the 360 second test.  No previous study performed.  Based upon age and body mass index, exercise capacity is less than predicted.      [] Mild exercise-induced hypoxemia described as an arterial oxygen saturation of 93-95% (or 3-4% less than at rest)  [x]  Moderate exercise-induced hypoxemia as 89-93%  []  Severe  exercise induced hypoxemia as < 89% O2 saturation.  Medicare Criteria for oxygen prescription comments:   []  When arterial oxygen saturation is at or below 88% during exercise (severe exercise induced hypoxemia) then the patient falls under Medicare Group 1 criteria for supplemental oxygen

## 2022-01-05 ENCOUNTER — PATIENT OUTREACH (OUTPATIENT)
Dept: ADMINISTRATIVE | Facility: OTHER | Age: 72
End: 2022-01-05
Payer: MEDICARE

## 2022-01-06 ENCOUNTER — OFFICE VISIT (OUTPATIENT)
Dept: CARDIOLOGY | Facility: CLINIC | Age: 72
End: 2022-01-06
Payer: MEDICARE

## 2022-01-06 VITALS
DIASTOLIC BLOOD PRESSURE: 76 MMHG | OXYGEN SATURATION: 95 % | HEART RATE: 44 BPM | WEIGHT: 224.44 LBS | BODY MASS INDEX: 30.44 KG/M2 | SYSTOLIC BLOOD PRESSURE: 138 MMHG

## 2022-01-06 DIAGNOSIS — I25.118 CORONARY ARTERY DISEASE OF NATIVE ARTERY OF NATIVE HEART WITH STABLE ANGINA PECTORIS: ICD-10-CM

## 2022-01-06 DIAGNOSIS — I50.22 CHRONIC SYSTOLIC CONGESTIVE HEART FAILURE: Primary | ICD-10-CM

## 2022-01-06 DIAGNOSIS — J42 CHRONIC BRONCHITIS, UNSPECIFIED CHRONIC BRONCHITIS TYPE: ICD-10-CM

## 2022-01-06 DIAGNOSIS — E78.2 MIXED HYPERLIPIDEMIA: ICD-10-CM

## 2022-01-06 DIAGNOSIS — Z72.0 TOBACCO USER: ICD-10-CM

## 2022-01-06 DIAGNOSIS — I10 PRIMARY HYPERTENSION: ICD-10-CM

## 2022-01-06 PROCEDURE — 1160F PR REVIEW ALL MEDS BY PRESCRIBER/CLIN PHARMACIST DOCUMENTED: ICD-10-PCS | Mod: CPTII,S$GLB,, | Performed by: INTERNAL MEDICINE

## 2022-01-06 PROCEDURE — 3075F PR MOST RECENT SYSTOLIC BLOOD PRESS GE 130-139MM HG: ICD-10-PCS | Mod: CPTII,S$GLB,, | Performed by: INTERNAL MEDICINE

## 2022-01-06 PROCEDURE — 1160F RVW MEDS BY RX/DR IN RCRD: CPT | Mod: CPTII,S$GLB,, | Performed by: INTERNAL MEDICINE

## 2022-01-06 PROCEDURE — 99214 OFFICE O/P EST MOD 30 MIN: CPT | Mod: S$GLB,,, | Performed by: INTERNAL MEDICINE

## 2022-01-06 PROCEDURE — 1159F PR MEDICATION LIST DOCUMENTED IN MEDICAL RECORD: ICD-10-PCS | Mod: CPTII,S$GLB,, | Performed by: INTERNAL MEDICINE

## 2022-01-06 PROCEDURE — 99999 PR PBB SHADOW E&M-EST. PATIENT-LVL III: ICD-10-PCS | Mod: PBBFAC,,, | Performed by: INTERNAL MEDICINE

## 2022-01-06 PROCEDURE — 99999 PR PBB SHADOW E&M-EST. PATIENT-LVL III: CPT | Mod: PBBFAC,,, | Performed by: INTERNAL MEDICINE

## 2022-01-06 PROCEDURE — 3078F PR MOST RECENT DIASTOLIC BLOOD PRESSURE < 80 MM HG: ICD-10-PCS | Mod: CPTII,S$GLB,, | Performed by: INTERNAL MEDICINE

## 2022-01-06 PROCEDURE — 3008F PR BODY MASS INDEX (BMI) DOCUMENTED: ICD-10-PCS | Mod: CPTII,S$GLB,, | Performed by: INTERNAL MEDICINE

## 2022-01-06 PROCEDURE — 99214 PR OFFICE/OUTPT VISIT, EST, LEVL IV, 30-39 MIN: ICD-10-PCS | Mod: S$GLB,,, | Performed by: INTERNAL MEDICINE

## 2022-01-06 PROCEDURE — 1159F MED LIST DOCD IN RCRD: CPT | Mod: CPTII,S$GLB,, | Performed by: INTERNAL MEDICINE

## 2022-01-06 PROCEDURE — 3075F SYST BP GE 130 - 139MM HG: CPT | Mod: CPTII,S$GLB,, | Performed by: INTERNAL MEDICINE

## 2022-01-06 PROCEDURE — 3078F DIAST BP <80 MM HG: CPT | Mod: CPTII,S$GLB,, | Performed by: INTERNAL MEDICINE

## 2022-01-06 PROCEDURE — 3008F BODY MASS INDEX DOCD: CPT | Mod: CPTII,S$GLB,, | Performed by: INTERNAL MEDICINE

## 2022-01-06 NOTE — PROCEDURES
[address] * [place] [address]  Telephone: [phone]  Test date: 22 Start: 22 23:03:42 Zohaib Wilson  Doctor: Dr. Patel End: 22 08:15:42 [id number]  Oximetry: Summary Report  Comments: Room air  Recording time: 09:12:00 Highest pulse: 250 Highest SpO2: 99%  Excluded samplin:19:00 Lowest pulse: 19 Lowest SpO2: 68%  Total valid samplin:53:00 Mean pulse: 76 Mean SpO2: 93.2%  1 S.D.: 8.5 1 S.D.: 1.8  Time with SpO2<90: 0:11:28, 2.2%  Time with SpO2<80: 0:00:36, 0.1%  Time with SpO2<70: 0:00:16, 0.1%  Time with SpO2<60: 0:00:00, 0.0%  Time with SpO2<89: 0:04:04, 0.8%  Time with SpO2 =>90: 8:41:32, 97.8%  Time with SpO2=>80 & <90: 0:10:52, 2.0%  Time with SpO2=>70 & <80: 0:00:20, 0.1%  Time with SpO2=>60 & <70: 0:00:16, 0.1%  The longest continuous time with saturation <=88 was 00:00:24, which started at  22 03:00:38.  A desaturation event was defined as a decrease of saturation by 4 or more.  2 events were excluded due to artifact.  There were 15 desaturation events over 3 minutes duration.  There were 23 desaturation events of less than 3 minutes duration during which:  The mean high was 94.9%. The mean low was 89.2%.  The number of these events that were:  > 0 & <10 seconds: 1 > 0 seconds: 23  =>10 & <20 seconds: 6 =>10 seconds: 22  =>20 & <30 seconds: 3 =>20 seconds: 16  =>30 & <40 seconds: 3 =>30 seconds: 13  =>40 & <50 seconds: 2 =>40 seconds: 10  =>50 & <60 seconds: 2 =>50 seconds: 8  =>60 seconds: 6 =>60 seconds: 6  The mean length of desaturation events that were >=10 sec & <=3 mins was: 47.6 sec.  Desaturation event index (events >=10 sec per sampled hour): 2.5  Desaturation event index (events >= 0 sec per sampled hour): 2.6    OVERNIGHT OXIMETRY REPORT:    Dictated by: George Patel MD  Test date: 2022  Dictated on: 2022      Comment: This test was performed on Room Air     A desaturation event was defined as a decrease of saturation  by 4 or more.    REPORT SUMMARY  Total valid samplin:53:00   High SpO2: 99%    Low SpO2: 68%    Mean SpO2  93.2 %  Cumulative time with oxygen saturation less than 88% (TC88): 0:04:04    CLINICAL INTERPRETATION  Maginal abnormality,  There is  significant nocturnal oxygen desaturation,  Clinical correlation is advised, and  Recommend overnight polysomnography if clinically indicated    Medicare Criteria Comments:   Oximetry test results suggest the patient does not fall under Medicare Group 1 Criteria. ( Arterial oxygen saturation at or below 88% for at least 5 minutes taken during sleep)  George Patel MD    Details about Medicare Group Criteria coverage can be found at http://www.cms.hhs.gov/manuals/downloads/

## 2022-01-06 NOTE — PROGRESS NOTES
Subjective:   Patient ID:  Zohaib Wilson Sr. is a 71 y.o. male who presents for follow up of No chief complaint on file.      70 yo male, 4 weeks f/u. 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  Smoker 40 yrs, no drinking  ekg NSR PACs. Incomplete RBBB  Chronic leg swelling  And the leg swelling improved after Edward catheter placement.  BNP was 44 in  and LDL was 69 in 2019   C/o SOB and chronic back pain. Pt states that he had severe arterial blockage at the legs. Denied claudication  Started to f/u with PCP    11/2021 visit  10/2021 echo showed EF 40% and mild AI. REMEDIOS 1.0 and LE arterial US showed mild Dz and suggesting INFLOW dz. And LE venous Us showed no DVT  01/2021 chest CTA showed calcified aorta  10/2021 abd CT w/o contrast at [facility] showing calcified aorta and no dilation  LDL was 69 in 2019  Limited walking due to lower back pain  Has muscle cramping. No resting leg pain and wounds.  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 EF 48%   SOB chronic and on CPAP, SOB improved after the breathing Rx. Cr decreased to 2.1 from 2.8    Interval history  Had sleep study yesterday. Has SOB, and improved by breathing Rx. Will see the surgeon for the abd. hernia.   No leg swelling.         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    Social History     Tobacco Use    Smoking status: Current Every Day Smoker     Packs/day: 1.50     Years: 60.00     Pack years: 90.00     Types: Cigarettes     Start date: 1963    Smokeless tobacco: Never Used   Substance Use Topics    Alcohol use: Not Currently    Drug use: Not Currently       No family history on  file.      Review of Systems   Constitutional: Positive for malaise/fatigue. Negative for decreased appetite, diaphoresis, fever and night sweats.   HENT: Negative for nosebleeds.    Eyes: Negative for blurred vision and double vision.   Cardiovascular: Positive for dyspnea on exertion and leg swelling. Negative for chest pain, claudication, irregular heartbeat, near-syncope, orthopnea, palpitations, paroxysmal nocturnal dyspnea and syncope.   Respiratory: Positive for shortness of breath. Negative for cough, sleep disturbances due to breathing, snoring, sputum production and wheezing.    Endocrine: Negative for cold intolerance and polyuria.   Hematologic/Lymphatic: Does not bruise/bleed easily.   Skin: Negative for rash.   Musculoskeletal: Positive for arthritis and back pain. Negative for falls, joint pain, joint swelling and neck pain.   Gastrointestinal: Negative for abdominal pain, heartburn, nausea and vomiting.   Genitourinary: Negative for dysuria, frequency and hematuria.   Neurological: Negative for difficulty with concentration, dizziness, focal weakness, headaches, light-headedness, numbness, seizures and weakness.   Psychiatric/Behavioral: Negative for depression, memory loss and substance abuse. The patient does not have insomnia.    Allergic/Immunologic: Negative for HIV exposure and hives.       Objective:   Physical Exam  HENT:      Head: Normocephalic.   Eyes:      Pupils: Pupils are equal, round, and reactive to light.   Neck:      Thyroid: No thyromegaly.      Vascular: Normal carotid pulses. No carotid bruit or JVD.   Cardiovascular:      Rate and Rhythm: Normal rate and regular rhythm.  No extrasystoles are present.     Chest Wall: PMI is not displaced.      Pulses: Normal pulses.      Heart sounds: Normal heart sounds. No murmur heard.  No gallop. No S3 sounds.    Pulmonary:      Effort: No respiratory distress.      Breath sounds: No stridor. Wheezing present.   Abdominal:      General:  Bowel sounds are normal.      Palpations: Abdomen is soft.      Tenderness: There is no abdominal tenderness. There is no rebound.   Skin:     Findings: No rash.   Neurological:      Mental Status: He is alert and oriented to person, place, and time.   Psychiatric:         Behavior: Behavior normal.         No results found for: CHOL  No results found for: HDL  No results found for: LDLCALC  No results found for: TRIG  No results found for: CHOLHDL    Chemistry        Component Value Date/Time     12/27/2021 1025    K 4.5 12/27/2021 1025     12/27/2021 1025    CO2 23 12/27/2021 1025    BUN 32 (H) 12/27/2021 1025    CREATININE 2.0 (H) 12/27/2021 1025    GLU 94 12/27/2021 1025        Component Value Date/Time    CALCIUM 9.6 12/27/2021 1025    ALKPHOS 130 11/04/2021 0537    AST 19 11/04/2021 0537    ALT 28 11/04/2021 0537    BILITOT 0.3 11/04/2021 0537    ESTGFRAFRICA 37.7 (A) 12/27/2021 1025    EGFRNONAA 32.6 (A) 12/27/2021 1025          No results found for: LABA1C, HGBA1C  Lab Results   Component Value Date    TSH 3.417 09/14/2021     Lab Results   Component Value Date    INR 1.0 01/13/2021     Lab Results   Component Value Date    WBC 6.54 11/04/2021    HGB 12.9 (L) 11/04/2021    HCT 40.1 11/04/2021    MCV 91 11/04/2021     11/04/2021     BMP  Sodium   Date Value Ref Range Status   12/27/2021 137 136 - 145 mmol/L Final     Potassium   Date Value Ref Range Status   12/27/2021 4.5 3.5 - 5.1 mmol/L Final     Chloride   Date Value Ref Range Status   12/27/2021 103 95 - 110 mmol/L Final     CO2   Date Value Ref Range Status   12/27/2021 23 23 - 29 mmol/L Final     BUN   Date Value Ref Range Status   12/27/2021 32 (H) 8 - 23 mg/dL Final     Creatinine   Date Value Ref Range Status   12/27/2021 2.0 (H) 0.5 - 1.4 mg/dL Final     Calcium   Date Value Ref Range Status   12/27/2021 9.6 8.7 - 10.5 mg/dL Final     Anion Gap   Date Value Ref Range Status   12/27/2021 11 8 - 16 mmol/L Final     eGFR if African  American   Date Value Ref Range Status   12/27/2021 37.7 (A) >60 mL/min/1.73 m^2 Final     eGFR if non    Date Value Ref Range Status   12/27/2021 32.6 (A) >60 mL/min/1.73 m^2 Final     Comment:     Calculation used to obtain the estimated glomerular filtration  rate (eGFR) is the CKD-EPI equation.        BNP  @LABRCNTIP(BNP,BNPTRIAGEBLO)@  @LABRCNTIP(troponini)@  CrCl cannot be calculated (Patient's most recent lab result is older than the maximum 7 days allowed.).  No results found in the last 24 hours.  No results found in the last 24 hours.  No results found in the last 24 hours.    Assessment:      1. Chronic systolic congestive heart failure    2. Coronary artery disease of native artery of native heart with stable angina pectoris    3. Primary hypertension    4. Mixed hyperlipidemia    5. Chronic bronchitis, unspecified chronic bronchitis type    6. Tobacco user      CHFrEF compensated  CAD old MI  Will do preop clearance after seeing the surgeon  Plan:   Refer to digit HTN  Continue entresto 24/26 bid toprolXL and Lipitor  Continue ASA and breathing rx  smoking cessation  F/u with pulm    Counseled DASH  Check Lipid profile in 6 months  Recommend heart-healthy diet, weight control and regular exercise.  Ken. Risk modification.   I have reviewed all pertinent labs and cardiac studies independently. Plans and recommendations have been formulated under my direct supervision. All questions answered and patient voiced understanding.   If symptoms persist go to the ED  RTC in 4 months    The addendum on 02/07/2022  Elevated periop risk of CV events for non-high risk procedure.  Good functional and exercise capacity.  No chest pain, active arrhythmia and CHF symptoms.  Ok to proceed the scheduled surgery without further cardiac study.  OK to hold Aspirin 5 to 7 days before the procedure and resume ASAP postop.  Continue Metoprolol and Statin periop.  Avoid periop fluid overloaded.

## 2022-01-07 ENCOUNTER — OFFICE VISIT (OUTPATIENT)
Dept: PULMONOLOGY | Facility: CLINIC | Age: 72
End: 2022-01-07
Payer: MEDICARE

## 2022-01-07 VITALS
HEART RATE: 71 BPM | RESPIRATION RATE: 20 BRPM | HEIGHT: 72 IN | DIASTOLIC BLOOD PRESSURE: 69 MMHG | OXYGEN SATURATION: 94 % | SYSTOLIC BLOOD PRESSURE: 129 MMHG | WEIGHT: 224.44 LBS | BODY MASS INDEX: 30.4 KG/M2

## 2022-01-07 DIAGNOSIS — I10 PRIMARY HYPERTENSION: ICD-10-CM

## 2022-01-07 DIAGNOSIS — J43.2 CENTRILOBULAR EMPHYSEMA: Primary | ICD-10-CM

## 2022-01-07 DIAGNOSIS — J44.9 COPD, GROUP C, BY GOLD 2013 CLASSIFICATION: ICD-10-CM

## 2022-01-07 DIAGNOSIS — E03.2 HYPOTHYROIDISM DUE TO NON-MEDICATION EXOGENOUS SUBSTANCES: ICD-10-CM

## 2022-01-07 DIAGNOSIS — R68.3 FINGER CLUBBING: ICD-10-CM

## 2022-01-07 DIAGNOSIS — I73.9 PVD (PERIPHERAL VASCULAR DISEASE): ICD-10-CM

## 2022-01-07 DIAGNOSIS — Z72.0 TOBACCO USER: ICD-10-CM

## 2022-01-07 DIAGNOSIS — G47.33 OSA (OBSTRUCTIVE SLEEP APNEA): ICD-10-CM

## 2022-01-07 DIAGNOSIS — E66.01 MORBID OBESITY: ICD-10-CM

## 2022-01-07 DIAGNOSIS — E78.2 MIXED HYPERLIPIDEMIA: ICD-10-CM

## 2022-01-07 DIAGNOSIS — Z77.090 SUSPECTED EXPOSURE TO ASBESTOS: ICD-10-CM

## 2022-01-07 PROCEDURE — 99999 PR PBB SHADOW E&M-EST. PATIENT-LVL V: CPT | Mod: PBBFAC,,, | Performed by: INTERNAL MEDICINE

## 2022-01-07 PROCEDURE — 1159F PR MEDICATION LIST DOCUMENTED IN MEDICAL RECORD: ICD-10-PCS | Mod: CPTII,S$GLB,, | Performed by: INTERNAL MEDICINE

## 2022-01-07 PROCEDURE — 3074F SYST BP LT 130 MM HG: CPT | Mod: CPTII,S$GLB,, | Performed by: INTERNAL MEDICINE

## 2022-01-07 PROCEDURE — 3288F PR FALLS RISK ASSESSMENT DOCUMENTED: ICD-10-PCS | Mod: CPTII,S$GLB,, | Performed by: INTERNAL MEDICINE

## 2022-01-07 PROCEDURE — 99214 OFFICE O/P EST MOD 30 MIN: CPT | Mod: S$GLB,,, | Performed by: INTERNAL MEDICINE

## 2022-01-07 PROCEDURE — 3008F BODY MASS INDEX DOCD: CPT | Mod: CPTII,S$GLB,, | Performed by: INTERNAL MEDICINE

## 2022-01-07 PROCEDURE — 3288F FALL RISK ASSESSMENT DOCD: CPT | Mod: CPTII,S$GLB,, | Performed by: INTERNAL MEDICINE

## 2022-01-07 PROCEDURE — 3078F PR MOST RECENT DIASTOLIC BLOOD PRESSURE < 80 MM HG: ICD-10-PCS | Mod: CPTII,S$GLB,, | Performed by: INTERNAL MEDICINE

## 2022-01-07 PROCEDURE — 1101F PT FALLS ASSESS-DOCD LE1/YR: CPT | Mod: CPTII,S$GLB,, | Performed by: INTERNAL MEDICINE

## 2022-01-07 PROCEDURE — 1159F MED LIST DOCD IN RCRD: CPT | Mod: CPTII,S$GLB,, | Performed by: INTERNAL MEDICINE

## 2022-01-07 PROCEDURE — 3074F PR MOST RECENT SYSTOLIC BLOOD PRESSURE < 130 MM HG: ICD-10-PCS | Mod: CPTII,S$GLB,, | Performed by: INTERNAL MEDICINE

## 2022-01-07 PROCEDURE — 1101F PR PT FALLS ASSESS DOC 0-1 FALLS W/OUT INJ PAST YR: ICD-10-PCS | Mod: CPTII,S$GLB,, | Performed by: INTERNAL MEDICINE

## 2022-01-07 PROCEDURE — 99999 PR PBB SHADOW E&M-EST. PATIENT-LVL V: ICD-10-PCS | Mod: PBBFAC,,, | Performed by: INTERNAL MEDICINE

## 2022-01-07 PROCEDURE — 3078F DIAST BP <80 MM HG: CPT | Mod: CPTII,S$GLB,, | Performed by: INTERNAL MEDICINE

## 2022-01-07 PROCEDURE — 1160F PR REVIEW ALL MEDS BY PRESCRIBER/CLIN PHARMACIST DOCUMENTED: ICD-10-PCS | Mod: CPTII,S$GLB,, | Performed by: INTERNAL MEDICINE

## 2022-01-07 PROCEDURE — 1160F RVW MEDS BY RX/DR IN RCRD: CPT | Mod: CPTII,S$GLB,, | Performed by: INTERNAL MEDICINE

## 2022-01-07 PROCEDURE — 3008F PR BODY MASS INDEX (BMI) DOCUMENTED: ICD-10-PCS | Mod: CPTII,S$GLB,, | Performed by: INTERNAL MEDICINE

## 2022-01-07 PROCEDURE — 99214 PR OFFICE/OUTPT VISIT, EST, LEVL IV, 30-39 MIN: ICD-10-PCS | Mod: S$GLB,,, | Performed by: INTERNAL MEDICINE

## 2022-01-07 NOTE — PATIENT INSTRUCTIONS
Patient Education       How to Use a CPAP or BPAP Machine   About this topic   You may be having problems breathing at night or during the day. You may have a problem like sleep apnea where your breathing stops for seconds at a time while sleeping. Your doctor has ordered a machine to help you breathe more easily. Other problems treated with this kind of machine include heart or lung failure or COPD. Doctors may also use this machine to treat some kinds of breathing problems in babies.  One kind of treatment is continuous positive airway pressure or CPAP. CPAP uses a steady level of pressure to deliver breaths through a mask and into your lungs. Other kinds of treatment use different levels of pressure. This is called bilevel positive airway pressure or BPAP. This treatment gives you more pressure when you breathe in and less pressure when you breathe out. Autotitrating positive airway pressure is called APAP. This treatment changes the pressure you are given based on how you are breathing.  All of these treatments are given by a machine to help with your breathing. It uses air pressure to keep the airways open. The machine most often has:  · A mask that covers your nose and sometimes your mouth too  · A tube that hooks the mask to the machine  · A pump or motor that gives off the air pressure that is attached to the facemask  General   · Many times, a respiratory therapist will help make sure your facemask fits well. This may be done at a sleep center or at your home.  ? You may need to try a few different masks to find one that is most comfortable for you. Some masks just fit over the nose. Others go inside of the nostrils. Still others fit over your mouth and nose. You may need to breathe in and out through your nose, based on the kind of mask you have.  ? Your mask should fit snugly and have no air leaks.  ? It may be easier to adjust your mask with your machine turned on to see if there are air leaks.  · A tube  joins the mask to the machine. The machine is set to give you air at a certain pressure. The therapist sets the pressure level, based on your doctor's order. If needed, your machine can also give you extra oxygen.  · Most often, you will wear the mask when you sleep at night. Sometimes, your doctor may have you wear it when you sleep during the day or at other times.  · How long you will need your CPAP machine is based on why you are using it.  What problems could happen?   · Air leaks from around the mask  · If you breathe through your mouth, the pressure escapes and doesn't treat your problem.  · Dry, stuffy nose and mouth  · Eye or skin irritation  · Noise from the machine is bothersome  · Air in stomach  Helpful tips   · If you are having problems getting used to wearing your mask, try these steps:  ? While you are awake, start by just holding the mask up to your face.  ? Next, add the hose to the mask, but don't put the straps on.  ? Hold the mask and hose on your face and turn the machine on.  ? When you are comfortable, put the straps on.  ? When you are able to breathe comfortably sitting up, try lying back a little at a time. Work on lying back until you are fully reclined.  ? Use the CPAP for a nap or a few hours during the day, and then move on to using it at night.  · If your nose is dry, use a humidifier in the room where you sleep. Some CPAP machines come with a built-in humidifier. Ask about using a saline spray or a prescription spray to help with a stuffy nose.  · Talk to your doctor if you are having problems with how your mask fits or if your skin is bothered.  · Learn how to care for your CPAP machine. Know what parts need to be cleaned or replaced and how often. Keep the contact information for your equipment company where you can easily find it, such as close to your machine.  · Consider how you will handle a power outage. Plan for emergency situations. You may need a generator or battery  backup.  Where can I learn more?   [employer]  https://familydoctor.org/condition/sleep-apnea/   [employer]  https://health.gov/myhealthfinder/topics/health-conditions/heart-health/reduce-your-risk-stroke   Last Reviewed Date   2020-10-09  Consumer Information Use and Disclaimer   This information is not specific medical advice and does not replace information you receive from your health care provider. This is only a brief summary of general information. It does NOT include all information about conditions, illnesses, injuries, tests, procedures, treatments, therapies, discharge instructions or life-style choices that may apply to you. You must talk with your health care provider for complete information about your health and treatment options. This information should not be used to decide whether or not to accept your health care providers advice, instructions or recommendations. Only your health care provider has the knowledge and training to provide advice that is right for you.  Copyright   Copyright © 2021 [employer] and its affiliates and/or licensors. All rights reserved.  Patient Education       Continuous Positive Airway Pressure   Why is this procedure done?   Continuous positive airway pressure is often called CPAP. It is a treatment used to help you breathe better. It keeps the airways open by using air pressure. It is the first choice of treatment for obstructive sleep apnea. This happens when your breathing stops or gets shallow while you are asleep. You are not able to get enough air from your mouth and nose into your lungs. Breathing pauses from a few seconds to minutes in sleep apnea. CPAP can also be used to treat other health problems like breathing problems in babies.  CPAP is given by a machine that helps breathing. It gives constant air pressure to keep the airways open. The machine most often has:  · A mask that covers your mouth and nose or just your  nose  · A tube that hooks the mask to the machine  · A pump or motor that gives off the air pressure that is attached to the tubing  What will the results be?   · Treat sleep apnea  · Lessen your daytime sleepiness  · Lessen chance for high blood pressure  · Improve heart function  What happens before the procedure?   · Your doctor will take your history. Talk to the doctor about:   ? All the drugs you are taking. Be sure to include all prescription and over-the-counter (OTC) drugs, and herbal supplements. Tell the doctor about any drug allergy. Bring a list of drugs you take with you.  · Your doctor will do an exam and may:  ? Check your lungs, ears, nose, and throat.   ? Order a sleep study. This will help your doctor know the right amount of airway pressure.  ? Send you to see a lung or ear, nose, and throat specialist.  What happens during the procedure?   · Your doctor will fit a mask tightly on your face. This will make sure that air does not leak out.  · The mask will cover your mouth and nose or just your nose. The pump will force air through your airway to keep it open. You will feel pressure from the mask.  · You will wear the face mask when you sleep during the study.  · If you have CPAP treatments for obstructive sleep apnea, you will use the machine for the rest of your life.  What happens after the procedure?   · After your sleep study, you will be able to go home.  · If you have obstructive sleep apnea, you will need to have a CPAP machine at home. CPAP machines are costly. You can rent a machine before you buy it. Your insurance may also help to pay for it.  What care is needed at home?   · Be sure to use the CPAP machine every night. If you stop using it, it is likely that your signs will return.  · Ask your doctor or machine provider how to care for and clean the machine and face mask.  What follow-up care is needed?   · Your doctor may ask you to make visits to the office to check on your  progress. Be sure to keep these visits.  What lifestyle changes are needed?   · Eat a healthy diet.  · Lose weight  · Exercise regularly.  · Keep your home smoke free. Stay away from secondhand and thirdhand smoke. Anyone in your home who smokes should quit smoking.  · Do not drink beer, wine, and mixed drinks (alcohol).  What problems could happen?   · Skin allergy or skin irritation from the mask  · Dry mouth and nose  · Air may leak from mask  · Problems from air pressure  · Nightmares and dreaming a lot during early use  Where can I learn more?   Family Doctor  https://familydoctor.org/cpap-devices-for-sleep-apnea/   NHS Choices  https://www.nhs.uk/conditions/obstructive-sleep-apnoea/   Last Reviewed Date   2020-11-02  Consumer Information Use and Disclaimer   This information is not specific medical advice and does not replace information you receive from your health care provider. This is only a brief summary of general information. It does NOT include all information about conditions, illnesses, injuries, tests, procedures, treatments, therapies, discharge instructions or life-style choices that may apply to you. You must talk with your health care provider for complete information about your health and treatment options. This information should not be used to decide whether or not to accept your health care providers advice, instructions or recommendations. Only your health care provider has the knowledge and training to provide advice that is right for you.  Copyright   Copyright © 2021 [employer] and its affiliates and/or licensors. All rights reserved.  Patient Education       Fluticasone, Umeclidinium, and Vilanterol (floo TIK a jennifere, ue me kli DIN ee um, & VYE natalia ter ol)   Brand Names: US Trelegy Ellipta   Brand Names: Daniel Trelegy Ellipta   What is this drug used for?   · It is used to treat COPD (chronic obstructive pulmonary disease).  · It is used to treat asthma.  · This drug is not to be  used to treat intense flare-ups of shortness of breath. Use a rescue inhaler. Talk with the doctor.    What do I need to tell my doctor BEFORE I take this drug?   · If you are allergic to this drug; any part of this drug; or any other drugs, foods, or substances. Tell your doctor about the allergy and what signs you had.  · If you have a milk allergy.  · If you are using another drug like this one. If you are not sure, ask your doctor or pharmacist.  · If you take other drugs called anticholinergics, like ipratropium or oxybutynin. Ask your doctor if you are not sure if any of your drugs are anticholinergic.  This is not a list of all drugs or health problems that interact with this drug.  Tell your doctor and pharmacist about all of your drugs (prescription or OTC, natural products, vitamins) and health problems. You must check to make sure that it is safe for you to take this drug with all of your drugs and health problems. Do not start, stop, or change the dose of any drug without checking with your doctor.  What are some things I need to know or do while I take this drug?   For all uses of this drug:   · Tell all of your health care providers that you take this drug. This includes your doctors, nurses, pharmacists, and dentists.  · Call your doctor right away if your breathing problems get worse, if your rescue inhaler does not work as well, or if you need to use your rescue inhaler more often.  · If you have high blood sugar (diabetes), you will need to watch your blood sugar closely.  · Do not take more of this drug or use it more often than you have been told. Deaths have happened when too much of this type of drug has been taken. Talk with your doctor.  · When changing from an oral steroid to another form of a steroid, there may be very bad and sometimes deadly side effects. Signs like weakness, feeling tired, dizziness, upset stomach, throwing up, not thinking clearly, or low blood sugar may happen. Call  your doctor right away if you have any of these signs. If you have a bad injury, have surgery, or any type of infection, you may need extra doses of oral steroids. These extra steroids will help your body deal with these stresses. Carry a warning card saying that there may be times when you may need extra steroids.  · You may have more chance of getting an infection. Wash hands often. Stay away from people with infections, colds, or flu.  · Chickenpox and measles can be very bad or even deadly in some people taking steroid drugs like this drug. Avoid being near anyone with chickenpox or measles if you have not had these health problems before. If you have been exposed to chickenpox or measles, talk with your doctor.  · Long-term use may raise the chance of cataracts or glaucoma. Talk with the doctor.  · Have an eye exam as you have been told by your doctor.  · This drug may cause weak bones (osteoporosis) with long-term use. Talk with your doctor to see if you have a higher chance of weak bones or if you have any questions.  · Have a bone density test as you have been told by your doctor. Talk with your doctor.  · Tell your doctor if you are pregnant, plan on getting pregnant, or are breast-feeding. You will need to talk about the benefits and risks to you and the baby.  COPD:   · The chance of getting pneumonia is higher in people with COPD. This drug may raise the chance of getting pneumonia. The chance of having to go to the hospital for pneumonia may also be higher. Some cases have been deadly. Talk with the doctor.  What are some side effects that I need to call my doctor about right away?   WARNING/CAUTION: Even though it may be rare, some people may have very bad and sometimes deadly side effects when taking a drug. Tell your doctor or get medical help right away if you have any of the following signs or symptoms that may be related to a very bad side effect:  · Signs of an allergic reaction, like rash;  hives; itching; red, swollen, blistered, or peeling skin with or without fever; wheezing; tightness in the chest or throat; trouble breathing, swallowing, or talking; unusual hoarseness; or swelling of the mouth, face, lips, tongue, or throat.  · Signs of infection like fever, chills, very bad sore throat, ear or sinus pain, cough, more sputum or change in color of sputum, pain with passing urine, mouth sores, or wound that will not heal.  · Signs of high blood sugar like confusion, feeling sleepy, more thirst, more hungry, passing urine more often, flushing, fast breathing, or breath that smells like fruit.  · Signs of low potassium levels like muscle pain or weakness, muscle cramps, or a heartbeat that does not feel normal.  · Signs of a weak adrenal gland like a very bad upset stomach or throwing up, very bad dizziness or passing out, muscle weakness, feeling very tired, mood changes, not hungry, or weight loss.  · Signs of high blood pressure like very bad headache or dizziness, passing out, or change in eyesight.  · Chest pain or pressure.  · Fast or abnormal heartbeat.  · Feeling nervous and excitable.  · Shakiness.  · Change in eyesight, eye pain, or very bad eye irritation.  · Seeing halos or bright colors around lights.  · Trouble passing urine, pain when passing urine, passing urine in a weak stream or drips, or passing urine more often.  · Redness or white patches in mouth or throat.  · Bone pain.  · This drug can cause very bad breathing problems right after you take a dose. Sometimes, this may be life-threatening. If you have trouble breathing, breathing that is worse, wheezing, or coughing after using this drug, use a rescue inhaler and get medical help right away.  What are some other side effects of this drug?   All drugs may cause side effects. However, many people have no side effects or only have minor side effects. Call your doctor or get medical help if any of these side effects or any other  side effects bother you or do not go away:  · Headache.  · Back pain.  · Joint pain.  · Flu-like signs.  · Change in taste.  · Constipation, diarrhea, throwing up, or upset stomach.  · Nose or throat irritation.  · Signs of a common cold.  These are not all of the side effects that may occur. If you have questions about side effects, call your doctor. Call your doctor for medical advice about side effects.  You may report side effects to your national health agency.  You may report side effects to the FDA at 1-156.380.9246. You may also report side effects at https://www.fda.gov/medwatch.  How is this drug best taken?   Use this drug as ordered by your doctor. Read all information given to you. Follow all instructions closely.  · For breathing in only.  · Take this drug at the same time of day.  · Keep using this drug as you have been told by your doctor or other health care provider, even if you feel well.  · Do not take the device apart or wash it. Do not use it with a spacer. Do not breathe out into the device.  · Close the device after each dose. Do not open the device unless a dose is being used.  · Rinse out mouth after each use. Do not swallow the rinse water. Spit it out.  · If you are using more than 1 inhaled drug, ask the doctor which drug to use first.  What do I do if I miss a dose?   · Use a missed dose as soon as you think about it.  · If it is close to the time for your next dose, skip the missed dose and go back to your normal time.  · Do not take more than 1 dose of this drug in 24 hours.    How do I store and/or throw out this drug?   · Store at room temperature in a dry place. Do not store in a bathroom.  · Protect from heat and sunlight.  · Do not take out the inhaler from the foil tray until right before first use.  · After opening, throw away any part not used after 6 weeks or when the indicator reads zero, whichever comes first.  · Keep all drugs in a safe place. Keep all drugs out of the  reach of children and pets.  · Throw away unused or  drugs. Do not flush down a toilet or pour down a drain unless you are told to do so. Check with your pharmacist if you have questions about the best way to throw out drugs. There may be drug take-back programs in your area.    General drug facts   · If your symptoms or health problems do not get better or if they become worse, call your doctor.  · Do not share your drugs with others and do not take anyone else's drugs.  · Some drugs may have another patient information leaflet. If you have any questions about this drug, please talk with your doctor, nurse, pharmacist, or other health care provider.  · This drug comes with an extra patient fact sheet called a Medication Guide. Read it with care. Read it again each time this drug is refilled. If you have any questions about this drug, please talk with the doctor, pharmacist, or other health care provider.  · If you think there has been an overdose, call your poison control center or get medical care right away. Be ready to tell or show what was taken, how much, and when it happened.    Consumer Information Use and Disclaimer   This generalized information is a limited summary of diagnosis, treatment, and/or medication information. It is not meant to be comprehensive and should be used as a tool to help the user understand and/or assess potential diagnostic and treatment options. It does NOT include all information about conditions, treatments, medications, side effects, or risks that may apply to a specific patient. It is not intended to be medical advice or a substitute for the medical advice, diagnosis, or treatment of a health care provider based on the health care provider's examination and assessment of a patient's specific and unique circumstances. Patients must speak with a health care provider for complete information about their health, medical questions, and treatment options, including any risks or  benefits regarding use of medications. This information does not endorse any treatments or medications as safe, effective, or approved for treating a specific patient. UpToDate, Inc. and its affiliates disclaim any warranty or liability relating to this information or the use thereof. The use of this information is governed by the Terms of Use, available at https://www.[employer]/en/solutions/lexicomp/about/mann.  Last Reviewed Date   2020-10-06  Copyright   © 2021 UpToDate, Inc. and its affiliates and/or licensors. All rights reserved.  Patient Education       Risk Factors for COPD   About this topic   Chronic obstructive pulmonary disease is also called COPD. It is a very serious lung illness which causes problems with breathing. People with COPD often have some other long term lung disease. This may be chronic bronchitis, emphysema, or both.  General   Some things may cause your COPD to get worse. They can also make you more likely to have problems from COPD. These are called risk factors. You can lower your risk for COPD or your risk of having problems with your COPD. Try to:  · Avoid smoke from tobacco:  ? Stop smoking. This is the single most important thing you can do to lower your risk of getting COPD. Talk to your doctor if you need help quitting.  ? Stay away from places where others are smoking or have smoked. This will limit your exposure to second hand or  smoke.  · Limit breathing in dust, pollution, and chemicals. These things can all bother your lungs much like smoking does.  ? Wear a mask when around these things to filter the air you breathe.  ? Keep your home clean and free of dust.  · Use an exhaust fan in the bathroom and kitchen.  ? Keep extra moisture and limit mold growth by using the bathroom exhaust fan.  ? Limit heat, odors, and chemicals from cooking by using the kitchen exhaust fan.  · Avoid infections. Protecting yourself from infections can lower your signs of  COPD.  ? Get a flu shot each year.  ? Talk to your doctor about a shot for pneumonia.  ? Wash your hands often.  · Eat a healthy diet and exercise regularly.  ? Keep your immune system strong to fight off infections by eating a healthy diet and keeping a normal weight.  ? Exercise can help your breathing. Talk with your doctor about the best exercises for you.  · Avoid very hot, very cold, or very humid weather.  ? Cover your mouth and nose when going outside when it is very cold. Limit the amount of time spent outside.  ? Stay in an air-conditioned place when it is very hot and humid.     Are there other health problems to treat?   Treat the underlying conditions right away such as infection or other lung problems.  What drugs may be needed?   You may be given drugs to:  · Reduce the swelling of the airways in your lungs. These are steroids.  · Make the mucus thinner and easier to release.  · Treat infection. These are antibiotics.  · Make it easier to breathe. These are inhalers.  Where can I learn more?   American Lung Association  http://www.lung.org/lung-health-and-diseases/lung-disease-lookup/copd/symptoms-causes-risk-factors/what-causes-copd.html  NHS Choices  https://www.nhs.uk/conditions/chronic-obstructive-pulmonary-disease-copd/   Last Reviewed Date   2020-10-07  Consumer Information Use and Disclaimer   This information is not specific medical advice and does not replace information you receive from your health care provider. This is only a brief summary of general information. It does NOT include all information about conditions, illnesses, injuries, tests, procedures, treatments, therapies, discharge instructions or life-style choices that may apply to you. You must talk with your health care provider for complete information about your health and treatment options. This information should not be used to decide whether or not to accept your health care providers advice, instructions or  recommendations. Only your health care provider has the knowledge and training to provide advice that is right for you.  Copyright   Copyright © 2021 [employer] and its affiliates and/or licensors. All rights reserved.

## 2022-01-07 NOTE — PROGRESS NOTES
Subjective:       Patient ID: Zohaib Wlison Sr. is a 71 y.o. male.    Chief Complaint: rev tests, COPD, Sleep Apnea, and Emphysema    Zohaib Wilson Sr. Is 71 y.o.  Presents with daughter who assists as caretaker  In scooter  Referred by Dr. Wray  Chronic bronchitis, HTN, CHF, PVD  90 pack year smoking history  Moved from [place] 2 years ago and has not seen pulmonary since  Use to use advair(?) and rescue inhaler, no O2  Also had a Cpap but no mask  Work as a   Possible history of asbestos exposure - father was a orozco   Complains of chronic SOB    01/07/2022  Follow up visit since 11/1/2021  Reviewed results  1. Sleep study: moderate to severe DORIAN: cpap supplies ordered and new CPAP  2. PFT: FEV1: 2.01L( 59.5%): added TRELEGY: will also seen pulmonary disease management  3. Tobacco abuse: cessation referal  4. CXR: no features of asbestosis  5.  Onsat reveiwed  6. ABG reviewed      Tobacco Use: High Risk    Smoking Tobacco Use: Current Every Day Smoker    Smokeless Tobacco Use: Never Used     Immunization History   Administered Date(s) Administered    COVID-19 Vaccine 07/01/2021, 07/29/2021    COVID-19, MRNA, LN-S, PF (MODERNA FULL 0.5 ML DOSE) 07/01/2021, 07/29/2021    Influenza (FLUAD) - Quadrivalent - Adjuvanted - PF *Preferred* (65+) 10/14/2021    Pneumococcal Polysaccharide - 23 Valent 10/14/2021     Active Ambulatory Problems     Diagnosis Date Noted    COPD, group C, by GOLD 2013 classification 09/14/2021    Hypothyroid 09/14/2021    Mixed hyperlipidemia 09/14/2021    Morbid obesity 09/14/2021    PVD (peripheral vascular disease) 09/14/2021    Tobacco user 09/14/2021    Constipation, acute 09/18/2021    PAC (premature atrial contraction) 10/13/2021    LISHA (acute kidney injury) 10/17/2021    Anemia 10/17/2021    Urinary retention 10/17/2021    Knight catheter in place 10/17/2021    Primary hypertension 11/04/2021    Chronic systolic congestive heart failure 11/04/2021     Nonspecific abnormal electrocardiogram (ECG) (EKG) 11/04/2021    Centrilobular emphysema 11/15/2021    DORIAN (obstructive sleep apnea) 11/15/2021    Finger clubbing 11/15/2021    Suspected exposure to asbestos 11/15/2021    Central sleep apnea     Coronary artery disease of native artery of native heart with stable angina pectoris 01/06/2022     Resolved Ambulatory Problems     Diagnosis Date Noted    No Resolved Ambulatory Problems     Past Medical History:   Diagnosis Date    COPD (chronic obstructive pulmonary disease)     Hyperlipidemia     Hypothyroidism     Speech abnormality      Current Outpatient Medications on File Prior to Visit   Medication Sig Dispense Refill    albuterol (ACCUNEB) 1.25 mg/3 mL Nebu Take 3 mLs (1.25 mg total) by nebulization 2 (two) times a day. Rescue 540 mL 3    aspirin (ECOTRIN) 81 MG EC tablet Take 1 tablet (81 mg total) by mouth once daily.      atorvastatin (LIPITOR) 40 MG tablet Take 1 tablet (40 mg total) by mouth every evening. 90 tablet 3    GOLYTELY 236-22.74-6.74 -5.86 gram suspension Take by mouth.      levothyroxine (SYNTHROID) 25 MCG tablet 1 tablet      metoprolol succinate (TOPROL-XL) 25 MG 24 hr tablet Take 1 tablet (25 mg total) by mouth once daily. 30 tablet 5    sacubitriL-valsartan (ENTRESTO) 24-26 mg per tablet Take 1 tablet by mouth 2 (two) times daily. 60 tablet 5    tamsulosin (FLOMAX) 0.4 mg Cap Take 0.4 mg by mouth.       No current facility-administered medications on file prior to visit.       Review of Systems   Constitutional: Positive for fatigue. Negative for fever and appetite change.   HENT: Negative for postnasal drip, rhinorrhea, sinus pressure, trouble swallowing and congestion.    Respiratory: Positive for shortness of breath and dyspnea on extertion. Negative for cough, choking and chest tightness.    Cardiovascular: Positive for leg swelling. Negative for chest pain.   Musculoskeletal: Positive for gait problem.  "  Gastrointestinal: Negative for nausea, vomiting and abdominal pain.   Neurological: Negative for dizziness, weakness and headaches.   All other systems reviewed and are negative.      Objective:       Vitals:    01/07/22 1533   BP: 129/69   Pulse: 71   Resp: 20   SpO2: (!) 94%   Weight: 101.8 kg (224 lb 6.9 oz)   Height: 6' (1.829 m)       Physical Exam   Constitutional: He is oriented to person, place, and time. He appears well-developed.   Ill appearing but non-toxic and no acute distress He is obese.   HENT:   Mouth/Throat: Abnormal dentition. Mallampati Score: IV.   19" neck circumference    Cardiovascular: Normal rate and regular rhythm.   Pulmonary/Chest: Normal expansion. No respiratory distress. He has no wheezes.   Quiet breath sounds   Abdominal: A hernia is present.   Musculoskeletal:         General: Edema present.      Cervical back: Normal range of motion and neck supple.   Lymphadenopathy: No supraclavicular adenopathy is present.     He has no cervical adenopathy.     He has no axillary adenopathy.   Neurological: He is alert and oriented to person, place, and time.   Skin: Skin is warm and dry. Nails show clubbing.   Psychiatric: He has a normal mood and affect.   Vitals reviewed.    Personal Diagnostic Review    EXAMINATION:  Ordering Provider: Dr. Patel          Interpreting Provider: Dr. Patel  Performing nurse/tech/RT: BRENTON Cabral RRT  Diagnosis: Emphysema (juan diego)  Height: 6' (182.9 cm)  Weight: 89 kg (196 lb 3.4 oz)  BMI (Calculated): 26.6              Patient Race:                                                                Phase Oxygen Assessment Supplemental O2 Heart   Rate Blood Pressure Awa Dyspnea Scale Rating   Resting 98 % Room Air 81 bpm 134/73 1   Exercise             Minute             1 95 % Room Air 99 bpm       2 90 % Room Air 110 bpm       3 96 % Room Air 90 bpm       4 99 % Room Air 100 bpm       5 91 % Room Air 100 bpm       6  93 % Room Air 101 bpm 128/87 4 "   Recovery             Minute             1 96 % Room Air 98 bpm       2 98 % Room Air 93 bpm       3 97 % Room Air 83 bpm       4 98 % Room Air 81 bpm 145/65 1      Six Minute Walk Summary  6MWT Status: completed without stopping  Patient Reported: Dyspnea         Interpretation:  Did the patient stop or pause?: No  Total Time Walked (Calculated): 360 seconds  Final Partial Lap Distance (feet): 50 feet  Total Distance Meters (Calculated): 137.16 meters  Predicted Distance Meters (Calculated): 562.49 meters  Percentage of Predicted (Calculated): 24.38  Peak VO2 (Calculated): 8.09  Mets: 2.31  Has The Patient Had a Previous Six Minute Walk Test?: No     Previous 6MWT Results  Has The Patient Had a Previous Six Minute Walk Test?: No              CLINICAL INTERPRETATION:  Six minute walk distance is 137.16m (24.38 % predicted) with light dyspnea.  Blood pressure remained stable and Heart rate remained stable with walking.  The patient did complete the study, walking 360 seconds of the 360 second test.  No previous study performed.  Based upon age and body mass index, exercise capacity is less than predicted.        []? Mild exercise-induced hypoxemia described as an arterial oxygen saturation of 93-95% (or 3-4% less than at rest)  [x]?  Moderate exercise-induced hypoxemia as 89-93%  []?  Severe exercise induced hypoxemia as < 89% O2 saturation.  Medicare Criteria for oxygen prescription comments:   []?  When arterial oxygen saturation is at or below 88% during exercise (severe exercise induced hypoxemia) then the patient falls under Medicare Group 1 criteria for supplemental oxygen     Moderate Obstructive Sleep apnea(DORIAN), AHI was 28.1/hr.   Electrocardiographic data showed presence of frequent PVC, frequent PAC.   EEG did not show alpha intrusion.   No snoring was audible during this study.   No significant periodic leg movements(PLMs) during sleep.   Mild Central Sleep Apnea (CSA)   Amanuel reduced sleep efficiency,  normal primary sleep latency, short REM sleep latency and no slow wave   latency.   Reduced testing in Supine position may underestimate DORIAN severity   Cheyne Cabezas Respiratory pattern   Obstructive Sleep Apnea (G47.33)   Central Sleep Apnea (G47.37)   Insomnia related to Sleep initiation/sleep maintaince/terminal   CPAP titration to determine optimal pressure required to alleviate sleep disordered breathing. BiPAP or ASV     Spirometry shows obstruction. Lung volume determination shows mild restriction is also present. Overall there is moderate ventilatory impairment. Airway mechanics are abnormal showing increased airway resistance and decreased conductance. DLCO is   severely decreased. MVV is severely decreased.   Â    Notes:   Â    MVV is reduced out of proportion to FEV1 suggesting poor effort or difficulty performing the maneuver or neuromuscular disease. DLCO may be underestimated due to submaximal IVC. Consider repeat study    OVERNIGHT OXIMETRY REPORT:     Dictated by: George Patel MD  Test date: 2022  Dictated on: 2022        Comment: This test was performed on Room Air      A desaturation event was defined as a decrease of saturation by 4 or more.     REPORT SUMMARY  Total valid samplin:53:00   High SpO2: 99%    Low SpO2: 68%    Mean SpO2  93.2 %  Cumulative time with oxygen saturation less than 88% (TC88): 0:04:04     CLINICAL INTERPRETATION  Maginal abnormality,  There is  significant nocturnal oxygen desaturation,  Clinical correlation is advised, and  Recommend overnight polysomnography if clinically indicated     Medicare Criteria Comments:   Oximetry test results suggest the patient does not fall under Medicare Group 1 Criteria. ( Arterial oxygen saturation at or below 88% for at least 5 minutes taken during sleep)  George Patel MD        Assessment:       Problem List Items Addressed This Visit     Mixed hyperlipidemia    Morbid obesity    PVD (peripheral vascular  disease)    Hypothyroid    Tobacco user    Relevant Orders    Ambulatory referral/consult to Smoking Cessation Program    Primary hypertension    Centrilobular emphysema - Primary    Relevant Medications    fluticasone-umeclidin-vilanter (TRELEGY ELLIPTA) 100-62.5-25 mcg DsDv    DORIAN (obstructive sleep apnea)    Relevant Orders    CPAP/BIPAP SUPPLIES    CPAP FOR HOME USE    MyChart Patient Entered CPAP Usage    Finger clubbing    Suspected exposure to asbestos    COPD, group C, by GOLD 2013 classification    Relevant Medications    fluticasone-umeclidin-vilanter (TRELEGY ELLIPTA) 100-62.5-25 mcg DsDv    Other Relevant Orders    X-Ray Chest PA And Lateral    Spirometry without Bronchodilator    Ambulatory referral/consult to Pulmonary Disease Management w/ Respiratory Therapist            Plan:         Follow up in about 3 months (around 4/7/2022), or cxr, maría, ordered CPAP, TRELEGY.    Patient verbalized understanding and left in no acute distress.     This note was prepared using voice recognition system and is likely to have sound alike errors that may have been overlooked even after proof reading.  Please call me with any questions    Discussed diagnosis, its evaluation, treatment and usual course. All questions answered.    Thank you for the courtesy of participating in the care of this patient    George Patel MD

## 2022-01-11 ENCOUNTER — TELEPHONE (OUTPATIENT)
Dept: PULMONOLOGY | Facility: CLINIC | Age: 72
End: 2022-01-11
Payer: MEDICARE

## 2022-01-11 NOTE — TELEPHONE ENCOUNTER
Chronic Disease Management:  Called patient to schedule initial Pulmonary Disease Management appointment.           Time spent on call    mins

## 2022-01-12 ENCOUNTER — TELEPHONE (OUTPATIENT)
Dept: PULMONOLOGY | Facility: CLINIC | Age: 72
End: 2022-01-12
Payer: MEDICARE

## 2022-01-12 NOTE — TELEPHONE ENCOUNTER
Chronic Disease Management:   Called patient to confirm Pulmonary Disease Management appointment scheduled on 1/13/22 beginning at 2:30 PM at [facility]. Patient confirmed. Advised patient to bring respiratory inhalers to visit.

## 2022-01-13 ENCOUNTER — CLINICAL SUPPORT (OUTPATIENT)
Dept: PULMONOLOGY | Facility: CLINIC | Age: 72
End: 2022-01-13
Payer: MEDICARE

## 2022-01-13 ENCOUNTER — OFFICE VISIT (OUTPATIENT)
Dept: OPHTHALMOLOGY | Facility: CLINIC | Age: 72
End: 2022-01-13
Payer: MEDICARE

## 2022-01-13 VITALS
BODY MASS INDEX: 30.4 KG/M2 | WEIGHT: 224.44 LBS | RESPIRATION RATE: 16 BRPM | HEART RATE: 81 BPM | OXYGEN SATURATION: 97 % | HEIGHT: 72 IN

## 2022-01-13 DIAGNOSIS — H02.135 SENILE ECTROPION OF BOTH LOWER EYELIDS: ICD-10-CM

## 2022-01-13 DIAGNOSIS — J42 CHRONIC BRONCHITIS, UNSPECIFIED CHRONIC BRONCHITIS TYPE: ICD-10-CM

## 2022-01-13 DIAGNOSIS — H04.123 DRY EYES, BILATERAL: ICD-10-CM

## 2022-01-13 DIAGNOSIS — H25.12 NUCLEAR SCLEROSIS OF LEFT EYE: Primary | ICD-10-CM

## 2022-01-13 DIAGNOSIS — J43.2 CENTRILOBULAR EMPHYSEMA: ICD-10-CM

## 2022-01-13 DIAGNOSIS — H02.132 SENILE ECTROPION OF BOTH LOWER EYELIDS: ICD-10-CM

## 2022-01-13 DIAGNOSIS — J44.9 COPD, GROUP C, BY GOLD 2013 CLASSIFICATION: ICD-10-CM

## 2022-01-13 DIAGNOSIS — H25.11 NUCLEAR SCLEROSIS OF RIGHT EYE: ICD-10-CM

## 2022-01-13 DIAGNOSIS — H02.834 DERMATOCHALASIS OF BOTH UPPER EYELIDS: ICD-10-CM

## 2022-01-13 DIAGNOSIS — H02.831 DERMATOCHALASIS OF BOTH UPPER EYELIDS: ICD-10-CM

## 2022-01-13 PROCEDURE — 92025 CPTRIZED CORNEAL TOPOGRAPHY: CPT | Mod: S$GLB,,, | Performed by: STUDENT IN AN ORGANIZED HEALTH CARE EDUCATION/TRAINING PROGRAM

## 2022-01-13 PROCEDURE — 99999 PR PBB SHADOW E&M-EST. PATIENT-LVL III: ICD-10-PCS | Mod: PBBFAC,,, | Performed by: STUDENT IN AN ORGANIZED HEALTH CARE EDUCATION/TRAINING PROGRAM

## 2022-01-13 PROCEDURE — 99999 PR PBB SHADOW E&M-EST. PATIENT-LVL IV: ICD-10-PCS | Mod: PBBFAC,,,

## 2022-01-13 PROCEDURE — 1126F PR PAIN SEVERITY QUANTIFIED, NO PAIN PRESENT: ICD-10-PCS | Mod: CPTII,S$GLB,, | Performed by: STUDENT IN AN ORGANIZED HEALTH CARE EDUCATION/TRAINING PROGRAM

## 2022-01-13 PROCEDURE — 1159F PR MEDICATION LIST DOCUMENTED IN MEDICAL RECORD: ICD-10-PCS | Mod: CPTII,S$GLB,, | Performed by: STUDENT IN AN ORGANIZED HEALTH CARE EDUCATION/TRAINING PROGRAM

## 2022-01-13 PROCEDURE — 99999 PR PBB SHADOW E&M-EST. PATIENT-LVL IV: CPT | Mod: PBBFAC,,,

## 2022-01-13 PROCEDURE — 92136 OPHTHALMIC BIOMETRY: CPT | Mod: LT,S$GLB,, | Performed by: STUDENT IN AN ORGANIZED HEALTH CARE EDUCATION/TRAINING PROGRAM

## 2022-01-13 PROCEDURE — 92004 COMPRE OPH EXAM NEW PT 1/>: CPT | Mod: S$GLB,,, | Performed by: STUDENT IN AN ORGANIZED HEALTH CARE EDUCATION/TRAINING PROGRAM

## 2022-01-13 PROCEDURE — 99999 PR PBB SHADOW E&M-EST. PATIENT-LVL III: CPT | Mod: PBBFAC,,, | Performed by: STUDENT IN AN ORGANIZED HEALTH CARE EDUCATION/TRAINING PROGRAM

## 2022-01-13 PROCEDURE — 92025 CORNEAL TOPOGRAPHY - OU - BOTH EYES: ICD-10-PCS | Mod: S$GLB,,, | Performed by: STUDENT IN AN ORGANIZED HEALTH CARE EDUCATION/TRAINING PROGRAM

## 2022-01-13 PROCEDURE — 1126F AMNT PAIN NOTED NONE PRSNT: CPT | Mod: CPTII,S$GLB,, | Performed by: STUDENT IN AN ORGANIZED HEALTH CARE EDUCATION/TRAINING PROGRAM

## 2022-01-13 PROCEDURE — 92004 PR EYE EXAM, NEW PATIENT,COMPREHESV: ICD-10-PCS | Mod: S$GLB,,, | Performed by: STUDENT IN AN ORGANIZED HEALTH CARE EDUCATION/TRAINING PROGRAM

## 2022-01-13 PROCEDURE — 1159F MED LIST DOCD IN RCRD: CPT | Mod: CPTII,S$GLB,, | Performed by: STUDENT IN AN ORGANIZED HEALTH CARE EDUCATION/TRAINING PROGRAM

## 2022-01-13 PROCEDURE — 1160F RVW MEDS BY RX/DR IN RCRD: CPT | Mod: CPTII,S$GLB,, | Performed by: STUDENT IN AN ORGANIZED HEALTH CARE EDUCATION/TRAINING PROGRAM

## 2022-01-13 PROCEDURE — 92136 IOL MASTER - OS - LEFT EYE: ICD-10-PCS | Mod: LT,S$GLB,, | Performed by: STUDENT IN AN ORGANIZED HEALTH CARE EDUCATION/TRAINING PROGRAM

## 2022-01-13 PROCEDURE — 1160F PR REVIEW ALL MEDS BY PRESCRIBER/CLIN PHARMACIST DOCUMENTED: ICD-10-PCS | Mod: CPTII,S$GLB,, | Performed by: STUDENT IN AN ORGANIZED HEALTH CARE EDUCATION/TRAINING PROGRAM

## 2022-01-13 RX ORDER — DIFLUPREDNATE OPHTHALMIC 0.5 MG/ML
1 EMULSION OPHTHALMIC 4 TIMES DAILY
Qty: 5 ML | Refills: 1 | Status: SHIPPED | OUTPATIENT
Start: 2022-01-13 | End: 2022-01-23

## 2022-01-13 NOTE — PATIENT INSTRUCTIONS
What is COPD?  COPD stands for chronic obstructive pulmonary disease. It means the airways in your lungs are blocked (obstructed). Because of this, it is hard to breathe. You may have trouble with daily activities because of shortness of breath. Over time the shortness of breath usually worsens making it more and more difficult to take care of yourself and take part in activities. Chronic bronchitis and emphysema are two common types of COPD.  What happens in chronic bronchitis?  The cells in the airways make more mucus than normal. The mucus builds up, narrowing the airways. This means less air travels into and out of the lungs. The lining of the airways may also become inflamed (swollen) and causes the airways to narrow even more.            What happens in emphysema?  The small airways are damaged and lose their stretchiness. The airways collapse when you exhale, causing air to get trapped in the air sacs. This means that less oxygen enters the blood vessels and less oxygen is delivered to all of the cells of your body. This makes it hard to breathe.         Damage to cilia  Cilia are small hairs that line and protect the airways. Smoking damages the cilia. Damaged cilia can't sweep mucus and particles away. Some of the cilia are destroyed. This damage worsens COPD.      How did I get COPD?  Most people get COPD from smoking. Cigarette smoke damages lungs, which can develop into COPD over many years.  How COPD affects you  COPD makes you work harder to breathe. Air may get trapped in the lungs, which prevents your lungs from filling completely with fresh oxygen-filled air when you inhale (breathe in). It's harder to take deep breaths especially when you are active and start breathing faster. Over time, your lungs may become enlarged filling the lung with air that does not transfer oxygen into the blood. These problems cause you to have shortness of breath (also called dyspnea). Wheezing (hoarse, whistling  breathing), chronic cough, and fatigue (feeling tired and worn out) are also common.   © 4931-7596 [employer]. [address] [facility], [place] [address]. All rights reserved. This information is not intended as a substitute for professional medical care. Always follow your healthcare professional's instructions.          [address]  Shortness of Breath: Maximizing Your Energy    Fear of shortness of breath may stop you from being as active as you once were. You don't have to live this way. Managing your time and pacing yourself can help you conserve energy and do more. It's even OK if you're short of breath sometimes. You can learn to work through this without limiting your activities.  Manage your time  Shortness of breath can make everyday tasks take longer. This means there's less time to do the things you enjoy. You can help prevent this by managing your time. Try these tips:   Plan ahead so your tasks are spaced throughout the day. As you plan, keep in mind the times of day you tend to have the most energy.   Do only one thing at a time. Finish one task before starting another.   Gather everything you need before you start a task. This cuts out unneeded steps while you're working.   Think about what you really need to do. Be realistic about what you can get done in a day.      Balance activity and rest  When you're tired, your activities will take longer. Fatigue also makes you more likely to get an infection. Plan your day so that your tasks are spaced throughout the day. To have more energy:   Stop and rest when you need to. Don't wait until you're overtired.   Switch back and forth between hard tasks and easy ones.   Give yourself plenty of time for each task, so you don't have to hurry.   Take 20- to 30-minute rest breaks after meals and throughout the day.   If an activity takes a lot of energy, break it into smaller parts. For instance, fold the laundry first. Then take a break before  putting it away.   Try not to exert yourself in extreme cold or heat.       Find ways to conserve energy  Conserving your energy can help you stay active and breathe better. The way you use your body during a task can help you conserve energy. Think of ways to make things easier, and take your time to ease shortness of breath.  For some tasks, you can also use special aids designed to reduce the amount of energy needed. Here are some tips:   Sit whenever possible, and keep your arms close to your body. Use slow, smooth motions.   Sit to dress and undress, shave, brush your teeth, and comb your hair. Use a long-handled reacher to pull on socks and shoes, and long-handled items like shoe horns.   Sit on a bench to shower. Use warm water, not hot. (Steam can make it harder to breathe.) Dry off by wrapping yourself in a terrycloth robe.   Use energy-saving appliances, such as an electric can opener, a power toothbrush, and a .   Use a cart with wheels to move groceries, laundry, dishes, and other items around the house. Some carts have seats so you can rest when you need to.   Use lightweight, nonstick pots and pans to cook. Soak dirty dishes instead of scrubbing them. Air-dry dishes, or use a .   Mix, cook, serve, and store foods in the same dish.   Keep the things you use most at waist level, so you can get them without reaching or bending.   Use steps slowly, pausing at each step. If you have steps outside or in your home, think about adding ramps or stair lifts.   Think about ways that others can help you. You might get help from friends, family members, or home health aides.      Remember to breathe  Sometimes people with an illness or condition that affects the lungs try to rush through tasks so they won't get short of breath. This uses more energy and can actually increase shortness of breath. Instead, slow down and pace your breathing. These tips may help:   Move slowly during  tasks that take a lot of effort, such as climbing stairs or pushing a shopping cart.   Use pursed-lip and diaphragmatic breathing while you go about a task.   Breathe out (exhale) when you exert effort. For example, breathe out as you lift up a grocery bag. Once you're holding the bag, breathe in. Ask the  at the [employer] to pack your grocery bags so they are light and easy to carry.   Focus on taking slow, deep breaths. If your breathing is shallow, you don't take in as much air.   Remember that it's OK to be short of breath. Just pace yourself and do pursed-lip breathing.      Talk with your healthcare provider about:   If you should use supplemental oxygen   If you need a referral to occupational and physical therapy. Therapists can help you with exercise and daily activities, and how to make things easier.      Pursed-lip breathing  This type of breathing helps you exhale better. Breathing this way during activity will help you reduce shortness of breath:   Relax your neck and shoulder muscles. Breathe in (inhale) slowly through your nose for 2 counts or more.  Pucker your lips as if you are going to blow out a candle. Breathe out slowly and gently through your lips for at least twice as long as you inhaled.Chronic Lung Disease: Preventing Lung Infections  Chronic lung diseases include chronic obstructive pulmonary disease (COPD), which includes chronic bronchitis and emphysema. Other chronic lung diseases include pulmonary fibrosis, sarcoidosis, and other conditions. When you have chronic lung diseases, it's very important to protect yourself from respiratory infections, like colds, the flu, and lung infections. Infections may cause your lung condition to worsen. Although you can't completely avoid them, there are things you can do to lessen the chance of infections.    Take precautions  Taking the following precautions can help you avoid illness:   Remember to keep your hands away from your  nose and mouth. Germs on your hands get into your respiratory system this way.   Wash your hands often. When you wash them:  o Use soap and warm water.  o Rub your hands together well for at least 20 seconds.  o Make sure to rinse them well.  o Dry your hands using clean towels or air-dry them.   Use hand  containing alcohol, if you are unable to wash your hands. Use the  after touching doorknobs, handles, and supermarket carts, for example, since lots of people touch them. Then wash your hands as soon as you can.   To help prevent the flu, get a flu vaccination every year. This may be given at your healthcare provider's office, a drugstore, or pharmacy, or at work. Get your flu shot as soon as the vaccines are available in your area. This is usually around September each year.   To help prevent pneumococcal pneumonia, get pneumonia vaccinations. Talk with your healthcare provider about which pneumococcal vaccinations you need.   Try to stay away from people with respiratory infections, such as colds or the flu. Stay away from crowded places, like shopping centers or movie theatres during cold and flu season.   If you smoke, think about quitting. In addition to causing or worsening many lung conditions, the lung damage from smoking increases your risk of infections. Stay away from others who smoke, too. This is also harmful and increases your chance of infections.  © 6592-6803 [employer]. [address] [place] [address]. All rights reserved. This information is not intended as a substitute for professional medical care. Always follow your healthcare professional's instructions.   Using Dry-Powder Inhalers (DPIs)  Some asthma medications are inhaled using inhalers. Dry-powder inhalers (DPIs) dispense measured doses of powdered medicine into your lungs. DPIs often have counters to track the number of doses used. Keep in mind that DPIs don't all work the same way. So be  sure you know how to use yours properly.  Using a DPI  1. Load the prescribed dose of medicine by following the instructions that came with the inhaler.  2. Breathe out normally, holding the inhaler away from your mouth. Hold your chin up.  3. Put the mouthpiece between your lips. Breathe in deeply through the inhaler--not through your nose. You may not feel or taste the medicine as you breathe in. This is normal.  4. Take the mouthpiece out of your mouth. Hold your breath for a count of 10, or as long as you can comfortably.  5. Breathe out slowly--but do not breathe out through the inhaler. Moisture from your breath can make the powder stick inside the inhaler.  6. Be sure to close the inhaler and store it in a dry place.  7. Rinse your mouth with water and spit it out, don't swallow it.  8. Do not wash your DPI with soap and water. To clean the mouthpiece, wipe with a dry cloth as needed.    © 1845-3602 [employer]. [address] [place] [address]. All rights reserved. This information is not intended as a substitute for professional medical care. Always follow your healthcare professional's instructions.             Asthma Trigger Checklist  Allergens, irritants, and other things may trigger your asthma. Check the box next to each of your triggers. After each trigger is a list of ways to avoid it.   Dust mites. Dust mites live in mattresses, bedding, carpets, curtains, and indoor dust.   To kill dust mites, wash bedding in hot water (130°F) each week.   Cover mattress and pillows with special dust-mite-proof cases.   Don't use upholstered furniture like sofas or chairs in the bedroom.   Use allergy-proof filters for air conditioners and furnaces. Replace or clean them as instructed.   If you can, replace carpeting with wood or tile louie, especially in the bedroom.  Animals. Animals with fur or feathers shed dander (allergens).   It's best to choose a pet that doesn't have fur or  feathers, such as a fish or a reptile.   If you have pets, keep them off your bed and out of your bedroom.   Wash your hands and clothes after handling pets.    Mold. Mold grows in damp places, such as bathrooms, basements, and closets.   Ask someone to clean damp areas in your home every week. Or try wearing a face mask while you clean.   Run an exhaust fan while bathing. Or leave a window open in the bathroom.   Repair water leaks in or around your home.   Have someone else cut grass or rake leaves, if possible.   Don't use vaporizers or humidifiers. They encourage mold growth.    Pollen. Pollen from trees, grasses, and weeds is a common allergen. (Flower pollens are generally not a problem).   Try to learn what types of pollen affect you most. Pollen levels vary depending on the plant, the season, and the time of day.   If possible, use air conditioning instead of opening the windows in your home or car.   Have someone else do yard work, if possible.    Cockroaches. Roaches are found in many homes and produce allergens.   Keep your kitchen clean and dry. A leaky faucet or drain can attract roaches.   Remove garbage from your home daily.   Store food in tightly sealed containers. Wash dishes as soon as they are used.   Use bait stations or traps to control roaches. Avoid using chemical sprays.    Smoke. Smoke may be from cigarettes, cigars, pipes, incense or candles, barbecues or grills, and fireplaces.   Don't smoke. And don't let people smoke in your home or car.   When you travel, ask for nonsmoking rental cars and hotel rooms.   Avoid fireplaces and wood stoves. If you can't, sit away from them. Make sure the smoke is directed outside.   Don't burn incense or use candles.   Move away from smoky outdoor cooking grills.    Smog.  Smog is from car exhaust and other pollution.   Read or listen to local air-quality reports. These let you know when air quality is poor.   Stay indoors as much as  you can on smoggy days. If possible, use air conditioning instead of opening the windows.   In your car, set air conditioning to recirculate air, so less pollution gets in.    Strong odors. These include air fresheners, deodorizers, and cleaning products; perfume, deodorant, and other beauty products; incense and candles; and insect sprays and other sprays.   Use scent-free products like deodorant or body lotion.   Avoid using cleaning products with bleach and ammonia. Make your own cleaning solution with white vinegar, baking soda, or mild dish soap.   Use exhaust fans while cooking. Or open a window, if possible.    Avoid perfumes, air fresheners, potpourri, and other scented products.          Other irritants. These include dust, aerosol sprays, and powders.   Wear a face mask while doing tasks like sanding, dusting, sweeping, and yard work. Open doors and windows if working indoors.   Use pump spray bottles instead of aerosols.   Pour liquid  onto a rag or cloth instead of spraying them.    Weather. Weather conditions can trigger symptoms or make them worse.   Watch for very high or low temperatures, very humid conditions, or a lot of wind, as these conditions can make symptoms worse.   Limit outdoor activity during the type of weather that affects you.   Wear a scarf over your mouth and nose in cold weather.    Colds, flu, and sinus infections. Upper respiratory infections can trigger asthma.   Wash your hands often with soap and warm water or use a hand  containing alcohol.   Get a yearly flu shot. And ask if you should get a pneumonia vaccine.   Take care of your general health. Get plenty of sleep. And eat a variety of healthy foods.    Food additives. Food additives can trigger asthma flare-ups in some people.   Check food labels for sulfites or other similar ingredients. These are often found in foods such as wine, beer, and dried fruits.   Avoid foods that contain these  additives.    Medicine. Aspirin, NSAIDS like ibuprofen and naproxen, and heart medicines like beta-blockers may be triggers.   Tell your health care provider if you think certain medicines trigger symptoms.    Be sure to read the labels on over-the-counter medicines. They may have ingredients that cause symptoms for you.   , Emotions. Laughing, crying, or feeling excited are triggers for some people.    To help you stay calm: Try breathing in slowly through your nose for a count of 2 seconds. Close your lips and breathe out for 4 seconds. Repeat.   Try to focus on a soothing image in your mind. This will help relax you and calm your breathing.   Remember to take your daily controller medicines. When you're upset or under stress, it's easy to forget.    Exercise. For some people, exercise can trigger symptoms. Don't let this keep you from being active.    If you have not been exercising regularly, start slow and work up gradually.   Take all of your medicines as prescribed.   If you use quick-relief medicine, make sure you have it with you when you exercise.   Stop if you have any symptoms. Make sure you talk with your provider about these symptoms.  © 4975-5590 [employer]. [address] [place] [address], [place] [address]. All rights reserved. This information is not intended as a substitute for professional medical care. Always follow your healthcare professional's instructions.            ACTION PLAN    GREEN ZONE   My sputum is clear/white/usual color and easily cleared.   My breathing is no harder than usual.   I can do my usual activities.   I can think clearly.   Take your usual medicines, including oxygen, as you are told to do so by your health care provider.   YELLOW ZONE   My sputum has change (color, thickness, amount).   I am more short of breath than usual.   I cough or wheeze more.   I weigh more and my legs/feet swell.   I cannot do my usual activities without resting.   Call  your health care provider. You will probably be told to begin taking an antibiotic and prednisone. Have your pharmacy phone number available.   RED ZONE   I cannot cough out my sputum.   I am much more short of breath than normal.   I need to sit up to breathe   I cannot do my usual activities.   I am unable to speak more than one or two words at a time.   I am confused.   Call your health care provider. You may be asked to come in to be seen, told to go to the emergency room, or told to call 9-1-1.

## 2022-01-13 NOTE — PROGRESS NOTES
HPI     Pt here for cataract annual check up. Pt states VA blurry in BOTH eyes.   Pt states not having any eye pain or discomfort. Pt c/o of watering and   burning eyes and has been on going for the last 5 years. Pt mentioned LEFT   eye goes completely black at times. PT denies any other ocular issues at   this time.         Last edited by Beth Herrera on 1/13/2022  2:06 PM. (History)            Assessment /Plan     For exam results, see Encounter Report.    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 Risks, benefits, and alternatives to cataract surgery were discussed.  Discussion of risks included possibility of infection as well as permanent vision loss.The pt expressed a desire to proceed with surgery with the potential for some reasonable degree of visual improvement. Recommended regular use of artificial tears and good lid hygiene to optimize surgical outcome.     Discussed IOL options and refractive outcomes for this patient.    Phaco left eye,   Topical, Complex, trypan blue  monofocal IOL.  Will aim for distance  Referral to [facility] for Ophthalmic surgery  Prescriptions sent for preoperative medications  Durezol or Prednisolone Acetate  Explained that patient may need glasses after surgery.    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

## 2022-01-13 NOTE — PROGRESS NOTES
Pulmonary Disease Management  [facility]  Initial Visit    Referring Provider: Dr. Patel  Diagnosis:Centrilobular emphysema, COPD, group C, by GOLD 2013 classification   Last Hospital Admission: n/a  Last provider visit: 1/7/22      Current Outpatient Medications:     albuterol (ACCUNEB) 1.25 mg/3 mL Nebu, Take 3 mLs (1.25 mg total) by nebulization 2 (two) times a day. Rescue, Disp: 540 mL, Rfl: 3    aspirin (ECOTRIN) 81 MG EC tablet, Take 1 tablet (81 mg total) by mouth once daily., Disp: , Rfl:     atorvastatin (LIPITOR) 40 MG tablet, Take 1 tablet (40 mg total) by mouth every evening., Disp: 90 tablet, Rfl: 3    difluprednate (DUREZOL) 0.05 % Drop ophthalmic solution, Place 1 drop into the left eye 4 (four) times daily. for 10 days, Disp: 5 mL, Rfl: 1    fluticasone-umeclidin-vilanter (TRELEGY ELLIPTA) 100-62.5-25 mcg DsDv, Inhale 1 puff into the lungs once daily., Disp: 60 each, Rfl: 11    GOLYTELY 236-22.74-6.74 -5.86 gram suspension, Take by mouth., Disp: , Rfl:     levothyroxine (SYNTHROID) 25 MCG tablet, 1 tablet, Disp: , Rfl:     metoprolol succinate (TOPROL-XL) 25 MG 24 hr tablet, Take 1 tablet (25 mg total) by mouth once daily., Disp: 30 tablet, Rfl: 5    sacubitriL-valsartan (ENTRESTO) 24-26 mg per tablet, Take 1 tablet by mouth 2 (two) times daily., Disp: 60 tablet, Rfl: 5    tamsulosin (FLOMAX) 0.4 mg Cap, Take 0.4 mg by mouth., Disp: , Rfl:     Review of patient's allergies indicates:   Allergen Reactions    Naproxen sodium Hives     Other reaction(s): hives    Iodine      Other reaction(s): hives       Smoking history:   Social History     Tobacco Use   Smoking Status Current Every Day Smoker    Packs/day: 1.50    Years: 60.00    Pack years: 90.00    Types: Cigarettes    Start date: 1963   Smokeless Tobacco Never Used       Pulse: 81  SpO2: 97 %  Resp: 16  Height: 6' (182.9 cm)  Weight: 101.8 kg (224 lb 6.9 oz)  BMI (kg/m2): 30.5  Resting Awa Dyspnea Rating : 0    Current Oxygen Use: No  DME Provider: [facility]   Does the patient use BiPAP, CPAP or Trilogy?: Yes (In need of new device)    COPD Questionnaire:  COPD Questionnaire  How often do you cough?: A little of the time  How often do you have phlegm (mucus) in your chest?: A little of the time  How often does your chest feel tight?: Never  When you walk up a hill or one flight of stairs, how often are you breathless?: All of the time  How often are you limited doing any activities at home?: Most of the time  How often are you confident leaving the house despite your lung condition?: Most of the time  How often do you sleep soundly?: Most of the time  How often do you have energy?: A little of the time  Total score: 18    Has this patient had any pulmonary studies (PFTS)?: Yes  PFT Results:  Pre FVC   Date/Time Value Ref Range Status   01/04/2022 07:54 PM 3.77 L Final     Pre FEV1   Date/Time Value Ref Range Status   01/04/2022 07:54 PM 2.01 L Final     Pre FEV1 FVC   Date/Time Value Ref Range Status   01/04/2022 07:54 PM 53.35 % Final     Pre TLC   Date/Time Value Ref Range Status   01/04/2022 07:54 PM 5.79 L Final     Pre DLCO   Date/Time Value Ref Range Status   01/04/2022 07:54 PM 11.17 ml/(min*mmHg) Final       Is this patient a candidate for pulmonary rehab?: Yes  Method Used for Education: Literature,Demonstration,Verbal  Did the patient demonstrate understanding?: Yes  What tests has the patient had done today?: Spirometry,Six Minute Walk      Educational assessment:   []            Good  [x]            Fair  []            Poor    Readiness to learn:   [x]            Good  []            Fair  []            Poor    Vision Status:   []            Good  [x]            Fair  []            Poor    Reading Ability:  []            Good  []            Fair  [x]            Poor    Knowledge of condition:   []            Good  [x]            Fair  []            Poor    Language Barriers:   []            Good  []             Fair  []            Poor  [x]            None    Cognitive/ Physical Barriers:   []            Good  [x]            Fair  []            Poor  []            None    Learning best by:                       []            Seeing  []            Hearing  []            Reading                         [x]            Doing    Describe any barrier /Limitation or financial implications of care choices identified     []            Financial  []            Emotional  []            Education  [x]            Vision/Hearing  []            Physical  []            None  []                TOPIC /CONTENT FOR TODAY:    [x]            MDI with or without spacer  [x]            Dry powder inhaler  []            Acapella   []           Peak Flow meter  [x]            COPD action plan  [x]            Nebulizer use  [x]            Oxygen use safety  [x]            Breathing and cough techniques  [x]            Energy conservation  [x]            Infection prevention  []           OTHER________________________        Learner:    [x]            Patient   []            Caregiver    Method:    [x]            Verbal explanation  [x]            Audio visual    [x]            Literature  [x]            Teach back      Evaluation:    [x]            Teach back  [x]            Demonstrate  [x]            Follow up phone call    []            2 weeks     [x]            4 weeks   [x]            PRN        Patient Concerns or Expectations:   Patient expressed interest in tobacco cessation.   -Referral entered.   Patient never received Accuneb nebulizer solution.   -Refill request sent to [facility] per patient request to transfer from alternative pharmacy. Will follow up.     Therapist Comments:   Patient was seen today to review respiratory medication purpose and proper technique for use of inhalers. Patient practiced proper technique using MDI with valved holding chamber (spacer) and DPI inhalers. Patient demonstrated understanding.  Discussed the difference between controller versus rescue medications. Reminded patient to rinse mouth thoroughly after ICS use.  Literature was given to patient.      Asthma trigger checklist was verbally reviewed and literature given to patient.    Discussed therapeutic goals for positive airway pressure therapy(CPAP or BiPAP): Ideal is usage 100% of nights for 6 - 8 hours per night. Minimum usage is 70% of night for at least 4 hours per night used. Reviewed CPAP habituation plan with patient. Patient expressed understanding.      Discussed complications of untreated sleep apnea with patient, including but not limited to high blood pressure, heart attack, stroke, obesity, diabetes and worsening heart failure. Patient voiced understanding. Patient is currently in need of a new CPAP device. Contacted [facility] for more information.      Infection prevention was discussed. Patient was reminded to get influenza vaccine. Hand hygiene and cleaning of respiratory equipment was also discussed. Patient verbalized understanding.      COPD action plan was reviewed and literature was given to patient. Patient verbalized understanding.     Plan:  Monthly Pulmonary Disease Management Questionnaire  Follow-up PDM appointment scheduled for 7/20/22  at 1:00 PM at [facility]   Reinforce education  Meds: Trelegy, Accuneb   DME Needs: [facility]  Action Plan: COPD  Immunization: Pneumococcal- current, Flu-current, Covid- current  Next Provider Visit: 4/22/22  Next Spirometry/CPFT: Not scheduled   Approximate time spent with patient: 60 mins

## 2022-01-14 RX ORDER — ALBUTEROL SULFATE 1.25 MG/3ML
1.25 SOLUTION RESPIRATORY (INHALATION) 2 TIMES DAILY
Qty: 540 ML | Refills: 3 | Status: SHIPPED | OUTPATIENT
Start: 2022-01-14 | End: 2023-01-14

## 2022-01-17 ENCOUNTER — NURSE TRIAGE (OUTPATIENT)
Dept: ADMINISTRATIVE | Facility: CLINIC | Age: 72
End: 2022-01-17
Payer: MEDICARE

## 2022-01-17 NOTE — TELEPHONE ENCOUNTER
Reason for Disposition   [1] Caller has medicine question about med NOT prescribed by PCP AND [2] triager unable to answer question (e.g., compatibility with other med, storage)    Additional Information   Negative: [1] Intentional drug overdose AND [2] suicidal thoughts or ideas   Negative: Drug overdose and triager unable to answer question   Negative: Caller requesting information unrelated to medicine   Negative: Caller requesting information about COVID-19 Vaccine   Negative: Caller requesting information about Emergency Contraception   Negative: Caller requesting information about Combined Birth Control Pills   Negative: Caller requesting information about Progestin Birth Control Pills   Negative: Caller requesting information about Post-Op pain or medicines   Negative: Caller requesting a prescription antibiotic (such as Penicillin) for Strep throat and has a positive culture result   Negative: Caller requesting a prescription anti-viral med (such as Tamiflu) and has influenza (flu)  symptoms   Negative: Immunization reaction suspected   Negative: Rash while taking a medicine or within 3 days of stopping it   Negative: [1] Asthma and [2] having symptoms of asthma (cough, wheezing, etc.)   Negative: [1] Symptom of illness (e.g., headache, abdominal pain, earache, vomiting) AND [2] more than mild   Negative: Breastfeeding questions about mother's medicines and diet   Negative: MORE THAN A DOUBLE DOSE of a prescription or over-the-counter (OTC) drug   Negative: [1] DOUBLE DOSE (an extra dose or lesser amount) of prescription drug AND [2] any symptoms (e.g., dizziness, nausea, pain, sleepiness)   Negative: [1] DOUBLE DOSE (an extra dose or lesser amount) of over-the-counter (OTC) drug AND [2] any symptoms (e.g., dizziness, nausea, pain, sleepiness)   Negative: Took another person's prescription drug   Negative: [1] DOUBLE DOSE (an extra dose or lesser amount) of prescription drug AND [2] NO  symptoms (Exception: a double dose of antibiotics)   Negative: Diabetes drug error or overdose (e.g., took wrong type of insulin or took extra dose)   Negative: [1] Prescription refill request for ESSENTIAL medicine (i.e., likelihood of harm to patient if not taken) AND [2] triager unable to refill per department policy   Negative: [1] Prescription not at pharmacy AND [2] was prescribed by PCP recently (Exception: triager has access to EMR and prescription is recorded there. Go to Home Care and confirm for pharmacy.)   Negative: [1] Pharmacy calling with prescription question AND [2] triager unable to answer question   Negative: [1] Caller has URGENT medicine question about med that PCP or specialist prescribed AND [2] triager unable to answer question   Negative: Medicine patch causing local rash or itching    Protocols used: MEDICATION QUESTION CALL-A-  pt called re CPAP. Attempted to get in touch with [facility] left on hold without response. Attempted to go  to get in touch with [facility] staff and left on hold. Pt notified urgent message sent to Dr Jose L chau to get CPAP machine. Call back with questions

## 2022-01-18 NOTE — TELEPHONE ENCOUNTER
Spoke with pt. Advised pt that CPAP was ordered on 01/07/22. It can take up to two weeks to process insurance.Advised pt that cpaps are on back order and it may take a little longer than usual for the [employer] company to reach out to him. Pt voiced understanding.

## 2022-01-24 ENCOUNTER — TELEPHONE (OUTPATIENT)
Dept: OPHTHALMOLOGY | Facility: CLINIC | Age: 72
End: 2022-01-24
Payer: MEDICARE

## 2022-01-24 NOTE — TELEPHONE ENCOUNTER
patient was seen for ct. eval then had to go to pul. and was instructed to come back to [facility]. Sx but patient left after pul appt.   spokewith sunitha she will call and give us a time they can come in to schedule his surgery

## 2022-01-24 NOTE — TELEPHONE ENCOUNTER
----- Message from Oumar Bowen sent at 1/24/2022  9:59 AM CST -----  Contact: Fidelia/Daughter  Patients daughter is calling to speak with the nurse regarding the cataract eye surgery. Reports after patiens last office visit patient was to be scheduled and daughter is requesting a call back. Please give Ms Mistry a call back at [phone] to discuss further.   Thanks,  RP

## 2022-02-02 ENCOUNTER — TELEPHONE (OUTPATIENT)
Dept: SMOKING CESSATION | Facility: CLINIC | Age: 72
End: 2022-02-02
Payer: MEDICARE

## 2022-02-02 NOTE — TELEPHONE ENCOUNTER
Spoke to patient and patient's daughter over the phone in regard to missed clinic intake appointment. Patient's daughter states that the patient will call back to reschedule after his surgery on the 17th. Provided patient with return contact information.

## 2022-02-04 ENCOUNTER — HOSPITAL ENCOUNTER (OUTPATIENT)
Dept: CARDIOLOGY | Facility: HOSPITAL | Age: 72
Discharge: HOME | End: 2022-02-04
Payer: MEDICARE

## 2022-02-04 ENCOUNTER — HOSPITAL ENCOUNTER (OUTPATIENT)
Dept: RADIOLOGY | Facility: HOSPITAL | Age: 72
Discharge: HOME | End: 2022-02-04
Attending: PHYSICIAN ASSISTANT
Payer: MEDICARE

## 2022-02-04 ENCOUNTER — CLINICAL SUPPORT (OUTPATIENT)
Dept: AUDIOLOGY | Facility: CLINIC | Age: 72
End: 2022-02-04
Payer: MEDICARE

## 2022-02-04 ENCOUNTER — OFFICE VISIT (OUTPATIENT)
Dept: INTERNAL MEDICINE | Facility: CLINIC | Age: 72
End: 2022-02-04
Payer: MEDICARE

## 2022-02-04 VITALS
TEMPERATURE: 98 F | BODY MASS INDEX: 30.54 KG/M2 | WEIGHT: 225.44 LBS | SYSTOLIC BLOOD PRESSURE: 120 MMHG | HEART RATE: 66 BPM | HEIGHT: 72 IN | OXYGEN SATURATION: 97 % | DIASTOLIC BLOOD PRESSURE: 68 MMHG

## 2022-02-04 DIAGNOSIS — Z01.818 PREOPERATIVE GENERAL PHYSICAL EXAMINATION: ICD-10-CM

## 2022-02-04 DIAGNOSIS — I50.22 CHRONIC SYSTOLIC CONGESTIVE HEART FAILURE: ICD-10-CM

## 2022-02-04 DIAGNOSIS — H91.90 HEARING LOSS, UNSPECIFIED HEARING LOSS TYPE, UNSPECIFIED LATERALITY: ICD-10-CM

## 2022-02-04 DIAGNOSIS — G47.33 OSA (OBSTRUCTIVE SLEEP APNEA): ICD-10-CM

## 2022-02-04 DIAGNOSIS — E78.2 MIXED HYPERLIPIDEMIA: ICD-10-CM

## 2022-02-04 DIAGNOSIS — I49.1 PAC (PREMATURE ATRIAL CONTRACTION): ICD-10-CM

## 2022-02-04 DIAGNOSIS — I73.9 PVD (PERIPHERAL VASCULAR DISEASE): ICD-10-CM

## 2022-02-04 DIAGNOSIS — J44.9 COPD, GROUP C, BY GOLD 2013 CLASSIFICATION: ICD-10-CM

## 2022-02-04 DIAGNOSIS — I10 PRIMARY HYPERTENSION: ICD-10-CM

## 2022-02-04 DIAGNOSIS — H61.23 BILATERAL IMPACTED CERUMEN: Primary | ICD-10-CM

## 2022-02-04 DIAGNOSIS — N18.32 STAGE 3B CHRONIC KIDNEY DISEASE: ICD-10-CM

## 2022-02-04 DIAGNOSIS — Z01.818 PREOPERATIVE GENERAL PHYSICAL EXAMINATION: Primary | ICD-10-CM

## 2022-02-04 PROCEDURE — 99999 PR PBB SHADOW E&M-EST. PATIENT-LVL I: CPT | Mod: PBBFAC,,, | Performed by: AUDIOLOGIST-HEARING AID FITTER

## 2022-02-04 PROCEDURE — 1160F RVW MEDS BY RX/DR IN RCRD: CPT | Mod: CPTII,S$GLB,, | Performed by: PHYSICIAN ASSISTANT

## 2022-02-04 PROCEDURE — 71046 X-RAY EXAM CHEST 2 VIEWS: CPT | Mod: TC

## 2022-02-04 PROCEDURE — 3288F PR FALLS RISK ASSESSMENT DOCUMENTED: ICD-10-PCS | Mod: CPTII,S$GLB,, | Performed by: PHYSICIAN ASSISTANT

## 2022-02-04 PROCEDURE — 93010 EKG 12-LEAD: ICD-10-PCS | Mod: ,,, | Performed by: INTERNAL MEDICINE

## 2022-02-04 PROCEDURE — 3074F PR MOST RECENT SYSTOLIC BLOOD PRESSURE < 130 MM HG: ICD-10-PCS | Mod: CPTII,S$GLB,, | Performed by: PHYSICIAN ASSISTANT

## 2022-02-04 PROCEDURE — 3288F FALL RISK ASSESSMENT DOCD: CPT | Mod: CPTII,S$GLB,, | Performed by: PHYSICIAN ASSISTANT

## 2022-02-04 PROCEDURE — 3078F PR MOST RECENT DIASTOLIC BLOOD PRESSURE < 80 MM HG: ICD-10-PCS | Mod: CPTII,S$GLB,, | Performed by: PHYSICIAN ASSISTANT

## 2022-02-04 PROCEDURE — 4010F PR ACE/ARB THEARPY RXD/TAKEN: ICD-10-PCS | Mod: CPTII,S$GLB,, | Performed by: PHYSICIAN ASSISTANT

## 2022-02-04 PROCEDURE — 71046 XR CHEST PA AND LATERAL: ICD-10-PCS | Mod: 26,,, | Performed by: RADIOLOGY

## 2022-02-04 PROCEDURE — 99999 PR PBB SHADOW E&M-EST. PATIENT-LVL V: CPT | Mod: PBBFAC,,, | Performed by: PHYSICIAN ASSISTANT

## 2022-02-04 PROCEDURE — 1101F PR PT FALLS ASSESS DOC 0-1 FALLS W/OUT INJ PAST YR: ICD-10-PCS | Mod: CPTII,S$GLB,, | Performed by: PHYSICIAN ASSISTANT

## 2022-02-04 PROCEDURE — 4010F ACE/ARB THERAPY RXD/TAKEN: CPT | Mod: CPTII,S$GLB,, | Performed by: PHYSICIAN ASSISTANT

## 2022-02-04 PROCEDURE — 1159F MED LIST DOCD IN RCRD: CPT | Mod: CPTII,S$GLB,, | Performed by: PHYSICIAN ASSISTANT

## 2022-02-04 PROCEDURE — 99214 PR OFFICE/OUTPT VISIT, EST, LEVL IV, 30-39 MIN: ICD-10-PCS | Mod: S$GLB,,, | Performed by: PHYSICIAN ASSISTANT

## 2022-02-04 PROCEDURE — 1159F PR MEDICATION LIST DOCUMENTED IN MEDICAL RECORD: ICD-10-PCS | Mod: CPTII,S$GLB,, | Performed by: PHYSICIAN ASSISTANT

## 2022-02-04 PROCEDURE — 99999 PR PBB SHADOW E&M-EST. PATIENT-LVL I: ICD-10-PCS | Mod: PBBFAC,,, | Performed by: AUDIOLOGIST-HEARING AID FITTER

## 2022-02-04 PROCEDURE — 3078F DIAST BP <80 MM HG: CPT | Mod: CPTII,S$GLB,, | Performed by: PHYSICIAN ASSISTANT

## 2022-02-04 PROCEDURE — 99999 PR PBB SHADOW E&M-EST. PATIENT-LVL V: ICD-10-PCS | Mod: PBBFAC,,, | Performed by: PHYSICIAN ASSISTANT

## 2022-02-04 PROCEDURE — 1160F PR REVIEW ALL MEDS BY PRESCRIBER/CLIN PHARMACIST DOCUMENTED: ICD-10-PCS | Mod: CPTII,S$GLB,, | Performed by: PHYSICIAN ASSISTANT

## 2022-02-04 PROCEDURE — 93005 ELECTROCARDIOGRAM TRACING: CPT

## 2022-02-04 PROCEDURE — 93010 ELECTROCARDIOGRAM REPORT: CPT | Mod: ,,, | Performed by: INTERNAL MEDICINE

## 2022-02-04 PROCEDURE — 1101F PT FALLS ASSESS-DOCD LE1/YR: CPT | Mod: CPTII,S$GLB,, | Performed by: PHYSICIAN ASSISTANT

## 2022-02-04 PROCEDURE — 71046 X-RAY EXAM CHEST 2 VIEWS: CPT | Mod: 26,,, | Performed by: RADIOLOGY

## 2022-02-04 PROCEDURE — 3008F PR BODY MASS INDEX (BMI) DOCUMENTED: ICD-10-PCS | Mod: CPTII,S$GLB,, | Performed by: PHYSICIAN ASSISTANT

## 2022-02-04 PROCEDURE — 3008F BODY MASS INDEX DOCD: CPT | Mod: CPTII,S$GLB,, | Performed by: PHYSICIAN ASSISTANT

## 2022-02-04 PROCEDURE — 3074F SYST BP LT 130 MM HG: CPT | Mod: CPTII,S$GLB,, | Performed by: PHYSICIAN ASSISTANT

## 2022-02-04 PROCEDURE — 99214 OFFICE O/P EST MOD 30 MIN: CPT | Mod: S$GLB,,, | Performed by: PHYSICIAN ASSISTANT

## 2022-02-04 RX ORDER — PREDNISOLONE ACETATE 10 MG/ML
SUSPENSION/ DROPS OPHTHALMIC
COMMUNITY
Start: 2022-01-20 | End: 2022-06-01 | Stop reason: SDUPTHER

## 2022-02-04 NOTE — PROGRESS NOTES
Subjective:       Patient ID: Zohaib Wilson Sr. is a 71 y.o. male.    Chief Complaint: Pre-op Exam    Patient presents to clinic today for preop exam. Patient is having photoselective vaporization of prostate by Dr. Ha on 2/17/22. Patient denies personal or family history of problems with anesthesia.     Review of Systems   Constitutional: Positive for chills ( chronic), fatigue ( chronic) and unexpected weight change ( normal appetite). Negative for fever.   HENT: Positive for hearing loss ( chronic) and rhinorrhea ( intermittent). Negative for congestion, dental problem, ear pain and trouble swallowing.    Eyes: Positive for visual disturbance ( history of cataracts; chronic). Negative for pain.   Respiratory: Positive for shortness of breath ( chronic, history of COPD). Negative for cough.    Cardiovascular: Negative for chest pain, palpitations and leg swelling.   Gastrointestinal: Negative for abdominal distention, abdominal pain, blood in stool, constipation, diarrhea, nausea and vomiting.   Genitourinary: Positive for difficulty urinating ( prostate problem) and dysuria ( started today; has catheter in place; instructed patient and guardian to contact urologist today). Negative for scrotal swelling and testicular pain.   Musculoskeletal: Negative for arthralgias and myalgias.   Skin: Negative for rash.   Neurological: Positive for numbness ( chronic, fingers). Negative for dizziness, weakness and headaches.   Hematological: Negative for adenopathy. Does not bruise/bleed easily.   Psychiatric/Behavioral: Negative for dysphoric mood and sleep disturbance. The patient is not nervous/anxious.          Objective:      Physical Exam  Vitals and nursing note reviewed.   Constitutional:       General: He is not in acute distress.     Appearance: He is well-developed. He is obese.   HENT:      Head: Normocephalic and atraumatic.   Eyes:      General: Lids are normal. No scleral icterus.     Extraocular  Movements: Extraocular movements intact.      Conjunctiva/sclera: Conjunctivae normal.   Cardiovascular:      Rate and Rhythm: Normal rate and regular rhythm.      Heart sounds: No murmur heard.  No friction rub. No gallop.    Pulmonary:      Effort: Pulmonary effort is normal.      Breath sounds: Normal breath sounds. No decreased breath sounds, wheezing, rhonchi or rales.   Neurological:      Mental Status: He is alert.      Cranial Nerves: No cranial nerve deficit.      Gait: Gait normal.   Psychiatric:         Mood and Affect: Mood and affect normal.         Assessment:       1. Preoperative general physical examination    2. Stage 3b chronic kidney disease    3. COPD, group C, by GOLD 2013 classification    4. Mixed hyperlipidemia    5. PVD (peripheral vascular disease)    6. PAC (premature atrial contraction)    7. Primary hypertension    8. Chronic systolic congestive heart failure    9. DORIAN (obstructive sleep apnea)    10. Hearing loss, unspecified hearing loss type, unspecified laterality        Plan:     Problem List Items Addressed This Visit        Pulmonary    COPD, group C, by GOLD 2013 classification       Cardiac/Vascular    Mixed hyperlipidemia    PVD (peripheral vascular disease)    PAC (premature atrial contraction)    Primary hypertension    Chronic systolic congestive heart failure       Renal/    Stage 3b chronic kidney disease       Other    DORIAN (obstructive sleep apnea)      Other Visit Diagnoses     Preoperative general physical examination    -  Primary    Relevant Orders    EKG 12-lead (Completed)    X-Ray Chest PA And Lateral (Completed)    CBC Auto Differential    Basic Metabolic Panel    Ambulatory referral/consult to Cardiology    Hearing loss, unspecified hearing loss type, unspecified laterality        Relevant Orders    Ambulatory referral/consult to Audiology           Reasonable risk for surgery due to chronic conditions; needs clearance by Cardiologist.     CBC and BMP are  stable.    Vent. Rate : 079 BPM     Atrial Rate : 079 BPM      P-R Int : 142 ms          QRS Dur : 100 ms       QT Int : 396 ms       P-R-T Axes : 002 -42 009 degrees      QTc Int : 454 ms     Sinus rhythm with occasional Premature ventricular complexes and Premature   atrial complexes   Left axis deviation   Incomplete right bundle branch block   Abnormal ECG   When compared with ECG of 06-DEC-2021 09:28,   Premature ventricular complexes are now Present     X-Ray Chest PA And Lateral  Narrative: EXAMINATION:  XR CHEST PA AND LATERAL    CLINICAL HISTORY:  Encounter for other preprocedural examination    TECHNIQUE:  PA and lateral views of the chest were performed.    COMPARISON:  11/15/2021    FINDINGS:  The cardiac and mediastinal silhouettes appear within normal limits.   Diffuse coarsening of the bronchovascular markings and pulmonary interstitium appears similar to multiple prior examinations.  No definite focal parenchymal consolidation or pleural effusion demonstrated.  No acute osseous findings demonstrated.  Impression: Unchanged appearance of the chest as above.  No acute findings    Electronically signed by: Yeison Luis MD  Date:    02/04/2022  Time:    12:54

## 2022-02-04 NOTE — PROGRESS NOTES
Referring provider: Nano Wilson Sr. was seen 02/04/2022 for an audiological evaluation.  Patient complains of hearing loss in the bilateral ear. He is accompanied by his daughter.     Otoscopy reveals bilateral cerumen impaction with no visualization of tympanic membrane. For that reason, audiogram could not be completed.     Patient is scheduled to return another day for ENT followed by audiogram. Patient is advised to use debrox nightly starting five days prior to his ENT appointment.

## 2022-02-07 ENCOUNTER — TELEPHONE (OUTPATIENT)
Dept: CARDIOLOGY | Facility: HOSPITAL | Age: 72
End: 2022-02-07
Payer: MEDICARE

## 2022-02-07 ENCOUNTER — PATIENT OUTREACH (OUTPATIENT)
Dept: PULMONOLOGY | Facility: CLINIC | Age: 72
End: 2022-02-07
Payer: MEDICARE

## 2022-02-07 NOTE — TELEPHONE ENCOUNTER
Chronic Disease Management  Called patient to complete Pulmonary Disease Management Questionnaire.    Patient is doing well at this time. Confirmed receipt of Accuneb nebulizer solution. Patient has not yet received new CPAP.   Contacted [facility] and message left for Radha. Will follow up.

## 2022-02-08 ENCOUNTER — TELEPHONE (OUTPATIENT)
Dept: INTERNAL MEDICINE | Facility: CLINIC | Age: 72
End: 2022-02-08
Payer: MEDICARE

## 2022-02-08 NOTE — TELEPHONE ENCOUNTER
Spoke with daughter of pt concerning preop clearance, she asked that I fax the documents to [facility],

## 2022-02-08 NOTE — TELEPHONE ENCOUNTER
----- Message from Maki Edwards LPN sent at 2/4/2022  4:50 PM CST -----  Regarding: Pre op Clearance  Good afternoon,    A referral was sent by Nano Lozano, PAC for pre-op clearance. His daughter reported you cleared him, clinic note says after he sees surgeon. Daughter says he is ready for his surgery.  Thank you,  Maki

## 2022-02-11 ENCOUNTER — PATIENT OUTREACH (OUTPATIENT)
Dept: ADMINISTRATIVE | Facility: OTHER | Age: 72
End: 2022-02-11
Payer: MEDICARE

## 2022-02-11 ENCOUNTER — DOCUMENTATION ONLY (OUTPATIENT)
Dept: PULMONOLOGY | Facility: HOSPITAL | Age: 72
End: 2022-02-11
Payer: MEDICARE

## 2022-02-11 NOTE — PROGRESS NOTES
Health Maintenance Due   Topic Date Due    TETANUS VACCINE  Never done    Colorectal Cancer Screening  Never done    Shingles Vaccine (1 of 2) Never done    Abdominal Aortic Aneurysm Screening  Never done    LDCT Lung Screen  01/14/2022    COVID-19 Vaccine (3 - Booster for Moderna series) 01/29/2022     Updates were requested from care everywhere.  Chart was reviewed for overdue Proactive [facility] Encounters ([facility]) topics (CRS, Breast Cancer Screening, Eye exam)  Health Maintenance has been updated.  LINKS immunization registry triggered.  Immunizations were reconciled.

## 2022-02-11 NOTE — PROGRESS NOTES
PRE OPERATIVE PULMONARY RISK ASSESSMENT:        Last seen : 01/07/2022      Procedure:Photoselective Vaporization of Prostate     Dr. Ha     Out patient procedure,                                  Duration: 30-45 mins    The patient is at increased risk for perioperative pulmonary  complications due to COPD,  DORIAN on CPAP, tobacco use, Age > 50year.           Other risk factors general anesthesia and long-acting neuromuscular blockade.   Risks of  surgery discussed in detail including risk of respiratory failure requiring mechanical ventilation, post operative pneumonia, post   operative atelectasis, deep venous thrombosis, pulmonary embolism and  death.      Patient expressed verbalized understanding.    Vaccination status reviewed and updated.      Pulmonary function studies, arterial blood gases  and chest x-ray reports independently reviewed and available on EPIC.         Six minute walk distance is 137.16m (24.38 % predicted) with light dyspnea.  Blood pressure remained stable and Heart rate remained stable with walking.  The patient did complete the study, walking 360 seconds of the 360 second test.  No previous study performed.  Based upon age and body mass index, exercise capacity is less than predicted    Arterial Blood Gas result:  pO2 84; pCO2 37.4; pH 7.39;  HCO3 23, %O2 Sat 96.     Spirometry shows obstruction. Lung volume determination shows mild restriction is also present. Overall there is moderate ventilatory impairment. Airway mechanics are abnormal showing increased airway resistance and decreased conductance. DLCO is   severely decreased. MVV is severely decreased    Patient has a    risk of postoperative pulmonary complications 3.2%    MILD RISK FOR PULMONARY COMPLICATIONS FOR THIS PROCEDURE    ARISCAT: 1.6% risk of in-hospital post-op pulmonary complications (composite including respiratory failure, respiratory infection, pleural effusion, atelectasis, pneumothorax, bronchospasm,  aspiration pneumonitis)      Risk has been discussed with patient who expressed and verbalized            understanding.   Based on my assessment , No pulmonary contraindications to procedure.                                                                                                                                   RECOMMENDATIONS:                                                             Perioperative pulmonary risk reduction strategies;  Begin patient  education regarding lung-expansion maneuvers like deep breathing and       incentive spirometry : Prophylaxis for cardiac events with perioperative beta-blockers: should be considered, specific regimen per anesthesia : Invasive hemodynamic monitoring perioperatively: should be considered :Limit duration of surgery to less than 3 hr if   possible: Use spinal or epidural anesthesia if possible: Avoid use of        pancuronium or long acting neuromuscular blockers: Use laparoscopic          procedures when possible: Use deep-breathing exercises or incentive          spirometry: Assure perioperative analgesiaand thromboprophylaxis.      Thank you    George Patel MD

## 2022-02-14 ENCOUNTER — TELEPHONE (OUTPATIENT)
Dept: INTERNAL MEDICINE | Facility: CLINIC | Age: 72
End: 2022-02-14
Payer: MEDICARE

## 2022-02-14 NOTE — TELEPHONE ENCOUNTER
----- Message from Emily Cortes sent at 2/14/2022  1:35 PM CST -----  Contact: Maricruz Alcantara would like a call back at [phone] in regards to getting a pre op clearance for the patient. She states that he is having surgery on Thursday. She has been trying to reach the office but no one is calling her back and she also sent a fax over last week.    Thanks

## 2022-02-15 ENCOUNTER — TELEPHONE (OUTPATIENT)
Dept: INTERNAL MEDICINE | Facility: CLINIC | Age: 72
End: 2022-02-15
Payer: MEDICARE

## 2022-02-15 NOTE — TELEPHONE ENCOUNTER
----- Message from Lo Paredes MA sent at 2/15/2022 10:30 AM CST -----  Contact: dr RAMON Ha    ----- Message -----  From: Tung Hernandez  Sent: 2/15/2022  10:17 AM CST  To: [facility] FIGUERED[facility] Staff    .Type:  Needs Medical Advice    Who Called:  Maricruz  Symptoms (please be specific):  How long has patient had these symptoms:   Pharmacy name and phone #:   Would the patient rather a call back or a response via [facility]?   Call    Best Call Back Number:  [phone]  or fax  [phone]  Additional Information:  Caller is requesting  a call back from the nurse in regards to the caller needing  the pt clearance refax because not all  of there pages came through please

## 2022-02-17 ENCOUNTER — PATIENT MESSAGE (OUTPATIENT)
Dept: ENDOSCOPY | Facility: HOSPITAL | Age: 72
End: 2022-02-17
Payer: MEDICARE

## 2022-03-10 ENCOUNTER — OFFICE VISIT (OUTPATIENT)
Dept: OTOLARYNGOLOGY | Facility: CLINIC | Age: 72
End: 2022-03-10
Payer: MEDICARE

## 2022-03-10 VITALS — HEART RATE: 73 BPM | DIASTOLIC BLOOD PRESSURE: 67 MMHG | TEMPERATURE: 98 F | SYSTOLIC BLOOD PRESSURE: 118 MMHG

## 2022-03-10 DIAGNOSIS — H61.23 BILATERAL IMPACTED CERUMEN: ICD-10-CM

## 2022-03-10 DIAGNOSIS — H93.13 TINNITUS OF BOTH EARS: ICD-10-CM

## 2022-03-10 DIAGNOSIS — H91.90 PERCEIVED HEARING CHANGES: Primary | ICD-10-CM

## 2022-03-10 PROCEDURE — 4010F ACE/ARB THERAPY RXD/TAKEN: CPT | Mod: CPTII,S$GLB,, | Performed by: PHYSICIAN ASSISTANT

## 2022-03-10 PROCEDURE — 1159F MED LIST DOCD IN RCRD: CPT | Mod: CPTII,S$GLB,, | Performed by: PHYSICIAN ASSISTANT

## 2022-03-10 PROCEDURE — 3078F PR MOST RECENT DIASTOLIC BLOOD PRESSURE < 80 MM HG: ICD-10-PCS | Mod: CPTII,S$GLB,, | Performed by: PHYSICIAN ASSISTANT

## 2022-03-10 PROCEDURE — 1126F PR PAIN SEVERITY QUANTIFIED, NO PAIN PRESENT: ICD-10-PCS | Mod: CPTII,S$GLB,, | Performed by: PHYSICIAN ASSISTANT

## 2022-03-10 PROCEDURE — 3074F PR MOST RECENT SYSTOLIC BLOOD PRESSURE < 130 MM HG: ICD-10-PCS | Mod: CPTII,S$GLB,, | Performed by: PHYSICIAN ASSISTANT

## 2022-03-10 PROCEDURE — 99999 PR PBB SHADOW E&M-EST. PATIENT-LVL III: CPT | Mod: PBBFAC,,, | Performed by: PHYSICIAN ASSISTANT

## 2022-03-10 PROCEDURE — 3078F DIAST BP <80 MM HG: CPT | Mod: CPTII,S$GLB,, | Performed by: PHYSICIAN ASSISTANT

## 2022-03-10 PROCEDURE — 99999 PR PBB SHADOW E&M-EST. PATIENT-LVL III: ICD-10-PCS | Mod: PBBFAC,,, | Performed by: PHYSICIAN ASSISTANT

## 2022-03-10 PROCEDURE — 1126F AMNT PAIN NOTED NONE PRSNT: CPT | Mod: CPTII,S$GLB,, | Performed by: PHYSICIAN ASSISTANT

## 2022-03-10 PROCEDURE — 99202 PR OFFICE/OUTPT VISIT, NEW, LEVL II, 15-29 MIN: ICD-10-PCS | Mod: 25,S$GLB,, | Performed by: PHYSICIAN ASSISTANT

## 2022-03-10 PROCEDURE — 99202 OFFICE O/P NEW SF 15 MIN: CPT | Mod: 25,S$GLB,, | Performed by: PHYSICIAN ASSISTANT

## 2022-03-10 PROCEDURE — 69210 PR REMOVAL IMPACTED CERUMEN REQUIRING INSTRUMENTATION, UNILATERAL: ICD-10-PCS | Mod: S$GLB,,, | Performed by: PHYSICIAN ASSISTANT

## 2022-03-10 PROCEDURE — 4010F PR ACE/ARB THEARPY RXD/TAKEN: ICD-10-PCS | Mod: CPTII,S$GLB,, | Performed by: PHYSICIAN ASSISTANT

## 2022-03-10 PROCEDURE — 3074F SYST BP LT 130 MM HG: CPT | Mod: CPTII,S$GLB,, | Performed by: PHYSICIAN ASSISTANT

## 2022-03-10 PROCEDURE — 1159F PR MEDICATION LIST DOCUMENTED IN MEDICAL RECORD: ICD-10-PCS | Mod: CPTII,S$GLB,, | Performed by: PHYSICIAN ASSISTANT

## 2022-03-10 PROCEDURE — 69210 REMOVE IMPACTED EAR WAX UNI: CPT | Mod: S$GLB,,, | Performed by: PHYSICIAN ASSISTANT

## 2022-03-10 NOTE — PROGRESS NOTES
Subjective:       Patient ID: Zohaib Wilson Sr. is a 71 y.o. male.    Chief Complaint: Cerumen Impaction and Hearing Loss    Patient is a very pleasant 71 y.o. male here to see me today for the first time for evaluation of hearing loss.  He was here recently for audiogram and noted to have cerumen impactions AU.  He has not used any ear drops to soften wax since that time.  He reports hearing loss that has been gradually progressing over the last 5+ years.  He has noted a difference in hearing between the ears, with the right ear being the better hearing ear.  He has noted tinnitus in both ears (describes it as birds chirping).  He has not had any recent issues with ear pain or ear drainage.  He has a family history of hearing loss, and has not had any previous otologic surgery.  He has a history of significant loud noise exposure. He denies issues with dizziness.      Review of Systems   Constitutional: Negative for fever.   HENT: Positive for hearing loss and tinnitus. Negative for ear discharge and ear pain.    Neurological: Negative for dizziness.         Objective:      Physical Exam  Vitals reviewed.   Constitutional:       Appearance: Normal appearance.   HENT:      Head: Normocephalic and atraumatic.      Right Ear: External ear normal. There is impacted cerumen (removal described below).      Left Ear: External ear normal. There is impacted cerumen.      Nose: Nose normal.      Mouth/Throat:      Mouth: Mucous membranes are moist.   Neurological:      Mental Status: He is alert.           Procedure Note    CHIEF COMPLAINT:  Cerumen Impaction    Description:  The patient was seated in an exam chair.  An ear speculum was placed in the right EAC and was examined under the microscope.  Alligator forceps, ear pick and/or loop curettes were used to remove pieces of a large brittle cerumen impaction.  The tympanic membrane was NOT visualized.  The procedure was repeated on the left side in a similar fashion but  unable to clear cerumen from this side either.  The patient tolerated the procedure well.      Assessment:       Problem List Items Addressed This Visit    None     Visit Diagnoses     Perceived hearing changes    -  Primary    Tinnitus of both ears        Bilateral impacted cerumen              Plan:        Cerumen impaction:  Unable to remove from either ear today.  Discussed that his cerumen impactions are quite large with dried, brittle wax.   I would recommend the use of a wax softening drop, either over the counter Debrox or mineral oil, on a nightly basis for the next 10-14 days and then RTC for ear cleaning and audiogram.  I also instructed the patient to avoid Qtips.  Tinnitus is most often caused by a hearing loss, and that as the hair cells are damaged, either genetic or as a result of loud noise exposure, they then cause tinnitus.  Some patients find that restricting the salt or caffeine in their diet helps, and there is also an OTC supplement, lipoflavinoids, that some people find to be effective though their benefit is not fully proven.  Tinnitus tends to be louder in times of stress and fatigue, and may decrease with time.  Sound machines may also be an effective masking technique if needed at night.

## 2022-03-23 ENCOUNTER — PATIENT OUTREACH (OUTPATIENT)
Dept: ADMINISTRATIVE | Facility: OTHER | Age: 72
End: 2022-03-23
Payer: MEDICARE

## 2022-03-23 NOTE — PROGRESS NOTES
Health Maintenance Due   Topic Date Due    TETANUS VACCINE  Never done    Colorectal Cancer Screening  Never done    Shingles Vaccine (1 of 2) Never done    Abdominal Aortic Aneurysm Screening  Never done    COVID-19 Vaccine (3 - Booster for Moderna series) 12/29/2021    LDCT Lung Screen  01/14/2022     Updates were requested from care everywhere.  Chart was reviewed for overdue Proactive [facility] Encounters ([facility]) topics (CRS, Breast Cancer Screening, Eye exam)  Health Maintenance has been updated.  LINKS immunization registry triggered.  Immunizations were reconciled.

## 2022-03-29 ENCOUNTER — PATIENT OUTREACH (OUTPATIENT)
Dept: PULMONOLOGY | Facility: CLINIC | Age: 72
End: 2022-03-29
Payer: MEDICARE

## 2022-03-29 NOTE — TELEPHONE ENCOUNTER
Chronic Disease Management  Called patient to complete Pulmonary Disease Management Questionnaire.    Spoke to patient's daughter who reports he is doing well at this time.     Time  spent with patient:  Minutes

## 2022-04-22 ENCOUNTER — TELEPHONE (OUTPATIENT)
Dept: PULMONOLOGY | Facility: CLINIC | Age: 72
End: 2022-04-22
Payer: MEDICARE

## 2022-04-22 NOTE — TELEPHONE ENCOUNTER
Called patient regarding missed pulmonary appointments. Spoke with daughter who states she is unable to get him here. She asked that he be rescheduled. Message sent to staff.

## 2022-04-27 ENCOUNTER — PATIENT MESSAGE (OUTPATIENT)
Dept: SMOKING CESSATION | Facility: CLINIC | Age: 72
End: 2022-04-27
Payer: MEDICARE

## 2022-05-09 ENCOUNTER — PATIENT OUTREACH (OUTPATIENT)
Dept: ADMINISTRATIVE | Facility: OTHER | Age: 72
End: 2022-05-09
Payer: MEDICARE

## 2022-05-09 DIAGNOSIS — Z12.11 ENCOUNTER FOR FIT (FECAL IMMUNOCHEMICAL TEST) SCREENING: Primary | ICD-10-CM

## 2022-05-10 ENCOUNTER — OFFICE VISIT (OUTPATIENT)
Dept: OPHTHALMOLOGY | Facility: CLINIC | Age: 72
End: 2022-05-10
Payer: MEDICARE

## 2022-05-10 DIAGNOSIS — H25.11 NUCLEAR SCLEROSIS OF RIGHT EYE: ICD-10-CM

## 2022-05-10 DIAGNOSIS — H25.12 NUCLEAR SCLEROSIS OF LEFT EYE: Primary | ICD-10-CM

## 2022-05-10 PROCEDURE — 4010F PR ACE/ARB THEARPY RXD/TAKEN: ICD-10-PCS | Mod: CPTII,S$GLB,, | Performed by: STUDENT IN AN ORGANIZED HEALTH CARE EDUCATION/TRAINING PROGRAM

## 2022-05-10 PROCEDURE — 99214 PR OFFICE/OUTPT VISIT, EST, LEVL IV, 30-39 MIN: ICD-10-PCS | Mod: S$GLB,,, | Performed by: STUDENT IN AN ORGANIZED HEALTH CARE EDUCATION/TRAINING PROGRAM

## 2022-05-10 PROCEDURE — 1159F PR MEDICATION LIST DOCUMENTED IN MEDICAL RECORD: ICD-10-PCS | Mod: CPTII,S$GLB,, | Performed by: STUDENT IN AN ORGANIZED HEALTH CARE EDUCATION/TRAINING PROGRAM

## 2022-05-10 PROCEDURE — 4010F ACE/ARB THERAPY RXD/TAKEN: CPT | Mod: CPTII,S$GLB,, | Performed by: STUDENT IN AN ORGANIZED HEALTH CARE EDUCATION/TRAINING PROGRAM

## 2022-05-10 PROCEDURE — 99999 PR PBB SHADOW E&M-EST. PATIENT-LVL II: ICD-10-PCS | Mod: PBBFAC,,, | Performed by: STUDENT IN AN ORGANIZED HEALTH CARE EDUCATION/TRAINING PROGRAM

## 2022-05-10 PROCEDURE — 99999 PR PBB SHADOW E&M-EST. PATIENT-LVL II: CPT | Mod: PBBFAC,,, | Performed by: STUDENT IN AN ORGANIZED HEALTH CARE EDUCATION/TRAINING PROGRAM

## 2022-05-10 PROCEDURE — 1160F RVW MEDS BY RX/DR IN RCRD: CPT | Mod: CPTII,S$GLB,, | Performed by: STUDENT IN AN ORGANIZED HEALTH CARE EDUCATION/TRAINING PROGRAM

## 2022-05-10 PROCEDURE — 1159F MED LIST DOCD IN RCRD: CPT | Mod: CPTII,S$GLB,, | Performed by: STUDENT IN AN ORGANIZED HEALTH CARE EDUCATION/TRAINING PROGRAM

## 2022-05-10 PROCEDURE — 1160F PR REVIEW ALL MEDS BY PRESCRIBER/CLIN PHARMACIST DOCUMENTED: ICD-10-PCS | Mod: CPTII,S$GLB,, | Performed by: STUDENT IN AN ORGANIZED HEALTH CARE EDUCATION/TRAINING PROGRAM

## 2022-05-10 PROCEDURE — 99214 OFFICE O/P EST MOD 30 MIN: CPT | Mod: S$GLB,,, | Performed by: STUDENT IN AN ORGANIZED HEALTH CARE EDUCATION/TRAINING PROGRAM

## 2022-05-10 NOTE — PROGRESS NOTES
HPI     Pt in today for a cat eval. Pt states his vision has changed since his   last visit. C/o blurred vision , glare, trouble driving at night ,   difficulty reading, and seeing the television over the past several months    Which eye is most affected? OS > OD    Activities of daily living impacted: everything    Do you have difficulty, even with glasses, with the following activities?    Reading small print such as labels on medicine bottles, a telephone book,   or food labels.   yes  Reading a newspaper or book?  yes  Seeing steps, stairs, or curbs  yes  Reading traffic signs, street signs, or store signs  yes  Doing fine handwork like sewing, knitting, crocheting or carpentry?  no  Writing checks or filling out forms  yes  Playing games such as bingo, dominos, card games or mahjong?  no  Watching television?  yes  Driving at night?  yes     1. NS OU  2. Dry Eyes OU  3. Senile Ectropion LL OU      Last edited by Preeti Kirk on 5/10/2022 10:39 AM. (History)            Assessment /Plan     For exam results, see Encounter Report.    Nuclear sclerosis of left eye- Proceed with surgery as scheduled    Nuclear sclerosis of right eye- see above      Return to clinic for PCIOL OS

## 2022-05-23 ENCOUNTER — PATIENT OUTREACH (OUTPATIENT)
Dept: PULMONOLOGY | Facility: CLINIC | Age: 72
End: 2022-05-23
Payer: MEDICARE

## 2022-05-24 ENCOUNTER — DOCUMENTATION ONLY (OUTPATIENT)
Dept: OPHTHALMOLOGY | Facility: CLINIC | Age: 72
End: 2022-05-24
Payer: MEDICARE

## 2022-05-24 NOTE — PROGRESS NOTES
Short Stay Record    Diagnosis: Nuclear Sclerotic Cataract left    CC: Blurry Vision     HPI:  Zohaib Wilson Sr. is a 71 y.o. male who presents for evaluation prior to ophthalmic surgery. No current complaints.     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  Social History     Tobacco Use    Smoking status: Current Every Day Smoker     Packs/day: 1.50     Years: 60.00     Pack years: 90.00     Types: Cigarettes     Start date: 1963    Smokeless tobacco: Never Used   Substance Use Topics    Alcohol use: Not Currently     No family history on file.  Review of patient's allergies indicates:   Allergen Reactions    Naproxen sodium Hives     Other reaction(s): hives    Iodine      Other reaction(s): hives         Current Outpatient Medications:     albuterol (ACCUNEB) 1.25 mg/3 mL Nebu, Take 3 mLs (1.25 mg total) by nebulization 2 (two) times a day. Rescue, Disp: 540 mL, Rfl: 3    aspirin (ECOTRIN) 81 MG EC tablet, Take 1 tablet (81 mg total) by mouth once daily., Disp: , Rfl:     atorvastatin (LIPITOR) 40 MG tablet, Take 1 tablet (40 mg total) by mouth every evening., Disp: 90 tablet, Rfl: 3    GOLYTELY 236-22.74-6.74 -5.86 gram suspension, Take by mouth., Disp: , Rfl:     levothyroxine (SYNTHROID) 25 MCG tablet, 1 tablet, Disp: , Rfl:     metoprolol succinate (TOPROL-XL) 25 MG 24 hr tablet, Take 1 tablet (25 mg total) by mouth once daily., Disp: 30 tablet, Rfl: 5    prednisoLONE acetate (PRED FORTE) 1 % DrpS, Place into the left eye., Disp: , Rfl:     sacubitriL-valsartan (ENTRESTO) 24-26 mg per tablet, Take 1 tablet by mouth 2 (two) times daily., Disp: 60 tablet, Rfl: 5    tamsulosin (FLOMAX) 0.4 mg Cap, Take 0.4 mg by mouth., Disp: , Rfl:     Review of  Systems:  10 Pt ROS negative except as stated in HPI    Physical Exam:  General Appearance:    A&Ox3, no distress, appears stated age   Head:    Normocephalic, without obvious abnormality, atraumatic   Eyes:    PERRL, EOM's intact   Back:     Symmetric, no curvature   Lungs:     respirations unlabored   Chest Wall:    No tenderness or deformity    Heart:  Abdomen:  Extremities:  Skin:    S1 and S2 present    Soft, non-tender    Extremities normal, atraumatic    Skin color, texture, turgor normal     Patient is stable for ophthalmic surgery under local and MAC.

## 2022-06-01 DIAGNOSIS — H25.12 NUCLEAR SCLEROSIS OF LEFT EYE: Primary | ICD-10-CM

## 2022-06-01 RX ORDER — PREDNISOLONE ACETATE 10 MG/ML
1 SUSPENSION/ DROPS OPHTHALMIC 4 TIMES DAILY
Qty: 5 ML | Refills: 1 | Status: SHIPPED | OUTPATIENT
Start: 2022-06-01

## 2022-06-02 ENCOUNTER — OUTSIDE PLACE OF SERVICE (OUTPATIENT)
Dept: OPHTHALMOLOGY | Facility: CLINIC | Age: 72
End: 2022-06-02

## 2022-06-02 ENCOUNTER — OFFICE VISIT (OUTPATIENT)
Dept: OPHTHALMOLOGY | Facility: CLINIC | Age: 72
End: 2022-06-02
Payer: MEDICARE

## 2022-06-02 DIAGNOSIS — Z98.890 POST-OPERATIVE STATE: Primary | ICD-10-CM

## 2022-06-02 DIAGNOSIS — Z98.42 CATARACT EXTRACTION STATUS OF EYE, LEFT: ICD-10-CM

## 2022-06-02 PROCEDURE — 66982 PR REMOVAL, CATARACT, W/INSRT INTRAOC LENS, W/O ENDO CYCLO, CMPLX: ICD-10-PCS | Mod: LT,,, | Performed by: STUDENT IN AN ORGANIZED HEALTH CARE EDUCATION/TRAINING PROGRAM

## 2022-06-02 PROCEDURE — 1159F PR MEDICATION LIST DOCUMENTED IN MEDICAL RECORD: ICD-10-PCS | Mod: CPTII,S$GLB,, | Performed by: STUDENT IN AN ORGANIZED HEALTH CARE EDUCATION/TRAINING PROGRAM

## 2022-06-02 PROCEDURE — 1159F MED LIST DOCD IN RCRD: CPT | Mod: CPTII,S$GLB,, | Performed by: STUDENT IN AN ORGANIZED HEALTH CARE EDUCATION/TRAINING PROGRAM

## 2022-06-02 PROCEDURE — 4010F ACE/ARB THERAPY RXD/TAKEN: CPT | Mod: CPTII,S$GLB,, | Performed by: STUDENT IN AN ORGANIZED HEALTH CARE EDUCATION/TRAINING PROGRAM

## 2022-06-02 PROCEDURE — 99999 PR PBB SHADOW E&M-EST. PATIENT-LVL II: ICD-10-PCS | Mod: PBBFAC,,, | Performed by: STUDENT IN AN ORGANIZED HEALTH CARE EDUCATION/TRAINING PROGRAM

## 2022-06-02 PROCEDURE — 1160F RVW MEDS BY RX/DR IN RCRD: CPT | Mod: CPTII,S$GLB,, | Performed by: STUDENT IN AN ORGANIZED HEALTH CARE EDUCATION/TRAINING PROGRAM

## 2022-06-02 PROCEDURE — 66982 XCAPSL CTRC RMVL CPLX WO ECP: CPT | Mod: LT,,, | Performed by: STUDENT IN AN ORGANIZED HEALTH CARE EDUCATION/TRAINING PROGRAM

## 2022-06-02 PROCEDURE — 99024 POSTOP FOLLOW-UP VISIT: CPT | Mod: S$GLB,,, | Performed by: STUDENT IN AN ORGANIZED HEALTH CARE EDUCATION/TRAINING PROGRAM

## 2022-06-02 PROCEDURE — 1160F PR REVIEW ALL MEDS BY PRESCRIBER/CLIN PHARMACIST DOCUMENTED: ICD-10-PCS | Mod: CPTII,S$GLB,, | Performed by: STUDENT IN AN ORGANIZED HEALTH CARE EDUCATION/TRAINING PROGRAM

## 2022-06-02 PROCEDURE — 99999 PR PBB SHADOW E&M-EST. PATIENT-LVL II: CPT | Mod: PBBFAC,,, | Performed by: STUDENT IN AN ORGANIZED HEALTH CARE EDUCATION/TRAINING PROGRAM

## 2022-06-02 PROCEDURE — 4010F PR ACE/ARB THEARPY RXD/TAKEN: ICD-10-PCS | Mod: CPTII,S$GLB,, | Performed by: STUDENT IN AN ORGANIZED HEALTH CARE EDUCATION/TRAINING PROGRAM

## 2022-06-02 PROCEDURE — 99024 PR POST-OP FOLLOW-UP VISIT: ICD-10-PCS | Mod: S$GLB,,, | Performed by: STUDENT IN AN ORGANIZED HEALTH CARE EDUCATION/TRAINING PROGRAM

## 2022-06-02 NOTE — PROGRESS NOTES
HPI     Pt in today for a 1 day post-op of the left eye.POD#1 s/p CEIOL OS. Has   received appropriate post-op drops. Denies any discomfort. No new ocular   complaints.      Last edited by Preeti Kirk on 6/2/2022  1:54 PM. (History)            Assessment /Plan     For exam results, see Encounter Report.    Post-operative state  Cataract extraction status of eye, left- S/P CEIOL OS Doing well. Mild edema    Continue gtts to operative eye:  PF QID   Damaso 128 QID      Reinstructed in importance of absolute compliance with Post-OP instructions including medications, shield at bedtime, protective glasses during the day, and limitation of activities. Follow up appointments in approximately one and six weeks or call immediately for increased pain, redness or vision loss.     RTC 1 week. MOCT if PH worse than 20/25

## 2022-06-10 ENCOUNTER — OFFICE VISIT (OUTPATIENT)
Dept: OPHTHALMOLOGY | Facility: CLINIC | Age: 72
End: 2022-06-10
Payer: MEDICARE

## 2022-06-10 ENCOUNTER — OFFICE VISIT (OUTPATIENT)
Dept: CARDIOLOGY | Facility: CLINIC | Age: 72
End: 2022-06-10
Payer: MEDICARE

## 2022-06-10 VITALS
DIASTOLIC BLOOD PRESSURE: 60 MMHG | BODY MASS INDEX: 29.39 KG/M2 | WEIGHT: 216.69 LBS | HEART RATE: 70 BPM | OXYGEN SATURATION: 96 % | SYSTOLIC BLOOD PRESSURE: 142 MMHG

## 2022-06-10 DIAGNOSIS — I10 PRIMARY HYPERTENSION: ICD-10-CM

## 2022-06-10 DIAGNOSIS — E78.2 MIXED HYPERLIPIDEMIA: ICD-10-CM

## 2022-06-10 DIAGNOSIS — Z98.42 CATARACT EXTRACTION STATUS OF EYE, LEFT: ICD-10-CM

## 2022-06-10 DIAGNOSIS — E66.01 MORBID OBESITY: ICD-10-CM

## 2022-06-10 DIAGNOSIS — I73.9 PVD (PERIPHERAL VASCULAR DISEASE): ICD-10-CM

## 2022-06-10 DIAGNOSIS — N18.32 STAGE 3B CHRONIC KIDNEY DISEASE: ICD-10-CM

## 2022-06-10 DIAGNOSIS — I50.22 CHRONIC SYSTOLIC CONGESTIVE HEART FAILURE: Primary | ICD-10-CM

## 2022-06-10 DIAGNOSIS — J44.9 COPD, GROUP C, BY GOLD 2013 CLASSIFICATION: ICD-10-CM

## 2022-06-10 DIAGNOSIS — I25.118 CORONARY ARTERY DISEASE OF NATIVE ARTERY OF NATIVE HEART WITH STABLE ANGINA PECTORIS: ICD-10-CM

## 2022-06-10 DIAGNOSIS — Z72.0 TOBACCO USER: ICD-10-CM

## 2022-06-10 DIAGNOSIS — Z98.890 POST-OPERATIVE STATE: Primary | ICD-10-CM

## 2022-06-10 DIAGNOSIS — I49.1 PAC (PREMATURE ATRIAL CONTRACTION): ICD-10-CM

## 2022-06-10 PROCEDURE — 1159F MED LIST DOCD IN RCRD: CPT | Mod: CPTII,S$GLB,, | Performed by: OPTOMETRIST

## 2022-06-10 PROCEDURE — 4010F PR ACE/ARB THEARPY RXD/TAKEN: ICD-10-PCS | Mod: CPTII,S$GLB,, | Performed by: INTERNAL MEDICINE

## 2022-06-10 PROCEDURE — 99024 PR POST-OP FOLLOW-UP VISIT: ICD-10-PCS | Mod: S$GLB,,, | Performed by: OPTOMETRIST

## 2022-06-10 PROCEDURE — 4010F PR ACE/ARB THEARPY RXD/TAKEN: ICD-10-PCS | Mod: CPTII,S$GLB,, | Performed by: OPTOMETRIST

## 2022-06-10 PROCEDURE — 3077F SYST BP >= 140 MM HG: CPT | Mod: CPTII,S$GLB,, | Performed by: INTERNAL MEDICINE

## 2022-06-10 PROCEDURE — 99999 PR PBB SHADOW E&M-EST. PATIENT-LVL III: ICD-10-PCS | Mod: PBBFAC,,, | Performed by: INTERNAL MEDICINE

## 2022-06-10 PROCEDURE — 1159F PR MEDICATION LIST DOCUMENTED IN MEDICAL RECORD: ICD-10-PCS | Mod: CPTII,S$GLB,, | Performed by: OPTOMETRIST

## 2022-06-10 PROCEDURE — 1100F PTFALLS ASSESS-DOCD GE2>/YR: CPT | Mod: CPTII,S$GLB,, | Performed by: INTERNAL MEDICINE

## 2022-06-10 PROCEDURE — 1159F PR MEDICATION LIST DOCUMENTED IN MEDICAL RECORD: ICD-10-PCS | Mod: CPTII,S$GLB,, | Performed by: INTERNAL MEDICINE

## 2022-06-10 PROCEDURE — 1160F PR REVIEW ALL MEDS BY PRESCRIBER/CLIN PHARMACIST DOCUMENTED: ICD-10-PCS | Mod: CPTII,S$GLB,, | Performed by: INTERNAL MEDICINE

## 2022-06-10 PROCEDURE — 3288F FALL RISK ASSESSMENT DOCD: CPT | Mod: CPTII,S$GLB,, | Performed by: INTERNAL MEDICINE

## 2022-06-10 PROCEDURE — 3078F PR MOST RECENT DIASTOLIC BLOOD PRESSURE < 80 MM HG: ICD-10-PCS | Mod: CPTII,S$GLB,, | Performed by: INTERNAL MEDICINE

## 2022-06-10 PROCEDURE — 3078F DIAST BP <80 MM HG: CPT | Mod: CPTII,S$GLB,, | Performed by: INTERNAL MEDICINE

## 2022-06-10 PROCEDURE — 1160F PR REVIEW ALL MEDS BY PRESCRIBER/CLIN PHARMACIST DOCUMENTED: ICD-10-PCS | Mod: CPTII,S$GLB,, | Performed by: OPTOMETRIST

## 2022-06-10 PROCEDURE — 1160F RVW MEDS BY RX/DR IN RCRD: CPT | Mod: CPTII,S$GLB,, | Performed by: INTERNAL MEDICINE

## 2022-06-10 PROCEDURE — 99999 PR PBB SHADOW E&M-EST. PATIENT-LVL I: ICD-10-PCS | Mod: PBBFAC,,, | Performed by: OPTOMETRIST

## 2022-06-10 PROCEDURE — 99214 PR OFFICE/OUTPT VISIT, EST, LEVL IV, 30-39 MIN: ICD-10-PCS | Mod: S$GLB,,, | Performed by: INTERNAL MEDICINE

## 2022-06-10 PROCEDURE — 3008F BODY MASS INDEX DOCD: CPT | Mod: CPTII,S$GLB,, | Performed by: INTERNAL MEDICINE

## 2022-06-10 PROCEDURE — 1160F RVW MEDS BY RX/DR IN RCRD: CPT | Mod: CPTII,S$GLB,, | Performed by: OPTOMETRIST

## 2022-06-10 PROCEDURE — 1100F PR PT FALLS ASSESS DOC 2+ FALLS/FALL W/INJURY/YR: ICD-10-PCS | Mod: CPTII,S$GLB,, | Performed by: INTERNAL MEDICINE

## 2022-06-10 PROCEDURE — 99999 PR PBB SHADOW E&M-EST. PATIENT-LVL III: CPT | Mod: PBBFAC,,, | Performed by: INTERNAL MEDICINE

## 2022-06-10 PROCEDURE — 99024 POSTOP FOLLOW-UP VISIT: CPT | Mod: S$GLB,,, | Performed by: OPTOMETRIST

## 2022-06-10 PROCEDURE — 3288F PR FALLS RISK ASSESSMENT DOCUMENTED: ICD-10-PCS | Mod: CPTII,S$GLB,, | Performed by: INTERNAL MEDICINE

## 2022-06-10 PROCEDURE — 3008F PR BODY MASS INDEX (BMI) DOCUMENTED: ICD-10-PCS | Mod: CPTII,S$GLB,, | Performed by: INTERNAL MEDICINE

## 2022-06-10 PROCEDURE — 1159F MED LIST DOCD IN RCRD: CPT | Mod: CPTII,S$GLB,, | Performed by: INTERNAL MEDICINE

## 2022-06-10 PROCEDURE — 4010F ACE/ARB THERAPY RXD/TAKEN: CPT | Mod: CPTII,S$GLB,, | Performed by: OPTOMETRIST

## 2022-06-10 PROCEDURE — 99214 OFFICE O/P EST MOD 30 MIN: CPT | Mod: S$GLB,,, | Performed by: INTERNAL MEDICINE

## 2022-06-10 PROCEDURE — 4010F ACE/ARB THERAPY RXD/TAKEN: CPT | Mod: CPTII,S$GLB,, | Performed by: INTERNAL MEDICINE

## 2022-06-10 PROCEDURE — 99999 PR PBB SHADOW E&M-EST. PATIENT-LVL I: CPT | Mod: PBBFAC,,, | Performed by: OPTOMETRIST

## 2022-06-10 PROCEDURE — 3077F PR MOST RECENT SYSTOLIC BLOOD PRESSURE >= 140 MM HG: ICD-10-PCS | Mod: CPTII,S$GLB,, | Performed by: INTERNAL MEDICINE

## 2022-06-10 RX ORDER — SACUBITRIL AND VALSARTAN 49; 51 MG/1; MG/1
1 TABLET, FILM COATED ORAL 2 TIMES DAILY
Qty: 60 TABLET | Refills: 5 | Status: SHIPPED | OUTPATIENT
Start: 2022-06-10 | End: 2023-07-02

## 2022-06-10 NOTE — PROGRESS NOTES
Subjective:   Patient ID:  Zohaib Wilson Sr. is a 71 y.o. male who presents for follow up of No chief complaint on file.      70 yo male, 6 months f/u. Moved to  from [place] for 2 yr  PMH CAD old MI, CHFrEF 40%,COPD HLD, PVD PAD, obesity and smoker, umbelical hernia.  Smoker 40 yrs, no drinking  ekg NSR PACs. Incomplete RBBB  Chronic leg swelling  And the leg swelling improved after Edward catheter placement.  BNP was 44 in  and LDL was 69 in 2019   10/2021 echo showed EF 40% and mild AI.   REMEDIOS 1.0 and LE arterial US showed mild Dz and suggesting INFLOW dz. And LE venous Us showed no DVT  01/2021 chest CTA showed calcified aorta  10/2021 abd CT w/o contrast at [facility] showing calcified aorta and no dilation  LDL was 69 in 2019  Limited walking due to lower back pain  Has muscle cramping. No resting leg pain and wounds.  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 EF 48%   SOB chronic and on CPAP, SOB improved after the breathing Rx. Cr decreased to 2.1 from 2.8     visit  Had sleep study yesterday. Has SOB, and improved by breathing Rx. Will see the surgeon for the abd. hernia.  No leg swelling.     Interval history  Abd hernia painful. H/o Had cataract procedure  On med for CHF. Weight loss 10 pounds in 4 to 6 months. Smoking and on breathing Rx  Decent appetite. 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 No pertinent surgical history.    Social History     Tobacco Use    Smoking status: Current Every Day Smoker     Packs/day: 1.50     Years: 60.00     Pack years: 90.00     Types: Cigarettes     Start date: 1963    Smokeless tobacco: Never Used   Substance  Use Topics    Alcohol use: Not Currently    Drug use: Not Currently       History reviewed. No pertinent family history.      Review of Systems   Constitutional: Positive for malaise/fatigue. Negative for decreased appetite, diaphoresis, fever and night sweats.   HENT: Negative for nosebleeds.    Eyes: Negative for blurred vision and double vision.   Cardiovascular: Positive for dyspnea on exertion and leg swelling. Negative for chest pain, claudication, irregular heartbeat, near-syncope, orthopnea, palpitations, paroxysmal nocturnal dyspnea and syncope.   Respiratory: Positive for shortness of breath. Negative for cough, sleep disturbances due to breathing, snoring, sputum production and wheezing.    Endocrine: Negative for cold intolerance and polyuria.   Hematologic/Lymphatic: Does not bruise/bleed easily.   Skin: Negative for rash.   Musculoskeletal: Positive for arthritis and back pain. Negative for falls, joint pain, joint swelling and neck pain.   Gastrointestinal: Positive for abdominal pain. Negative for heartburn, nausea and vomiting.   Genitourinary: Negative for dysuria, frequency and hematuria.   Neurological: Negative for difficulty with concentration, dizziness, focal weakness, headaches, light-headedness, numbness, seizures and weakness.   Psychiatric/Behavioral: Negative for depression, memory loss and substance abuse. The patient does not have insomnia.    Allergic/Immunologic: Negative for HIV exposure and hives.       Objective:   Physical Exam  HENT:      Head: Normocephalic.   Eyes:      Pupils: Pupils are equal, round, and reactive to light.   Neck:      Thyroid: No thyromegaly.      Vascular: Normal carotid pulses. No carotid bruit or JVD.   Cardiovascular:      Rate and Rhythm: Normal rate and regular rhythm.  No extrasystoles are present.     Chest Wall: PMI is not displaced.      Pulses: Normal pulses.      Heart sounds: Normal heart sounds. No murmur heard.    No gallop. No S3 sounds.    Pulmonary:      Effort: No respiratory distress.      Breath sounds: No stridor. Wheezing present.   Abdominal:      General: Bowel sounds are normal.      Palpations: Abdomen is soft.      Tenderness: There is no abdominal tenderness. There is no rebound.   Skin:     Findings: No rash.   Neurological:      Mental Status: He is alert and oriented to person, place, and time.   Psychiatric:         Behavior: Behavior normal.         No results found for: CHOL  No results found for: HDL  No results found for: LDLCALC  No results found for: TRIG  No results found for: CHOLHDL    Chemistry        Component Value Date/Time     02/04/2022 1218    K 4.6 02/04/2022 1218     02/04/2022 1218    CO2 24 02/04/2022 1218    BUN 37 (H) 02/04/2022 1218    CREATININE 1.8 (H) 02/04/2022 1218    GLU 97 02/04/2022 1218        Component Value Date/Time    CALCIUM 10.3 02/04/2022 1218    ALKPHOS 130 11/04/2021 0537    AST 19 11/04/2021 0537    ALT 28 11/04/2021 0537    BILITOT 0.3 11/04/2021 0537    ESTGFRAFRICA 42.8 (A) 02/04/2022 1218    EGFRNONAA 37.0 (A) 02/04/2022 1218          No results found for: LABA1C, HGBA1C  Lab Results   Component Value Date    TSH 3.417 09/14/2021     Lab Results   Component Value Date    INR 1.0 01/13/2021     Lab Results   Component Value Date    WBC 6.41 02/04/2022    HGB 13.0 (L) 02/04/2022    HCT 41.5 02/04/2022    MCV 95 02/04/2022     (L) 02/04/2022     BMP  Sodium   Date Value Ref Range Status   02/04/2022 138 136 - 145 mmol/L Final     Potassium   Date Value Ref Range Status   02/04/2022 4.6 3.5 - 5.1 mmol/L Final     Chloride   Date Value Ref Range Status   02/04/2022 105 95 - 110 mmol/L Final     CO2   Date Value Ref Range Status   02/04/2022 24 23 - 29 mmol/L Final     BUN   Date Value Ref Range Status   02/04/2022 37 (H) 8 - 23 mg/dL Final     Creatinine   Date Value Ref Range Status   02/04/2022 1.8 (H) 0.5 - 1.4 mg/dL Final     Calcium   Date Value Ref Range Status    02/04/2022 10.3 8.7 - 10.5 mg/dL Final     Anion Gap   Date Value Ref Range Status   02/04/2022 9 8 - 16 mmol/L Final     eGFR if    Date Value Ref Range Status   02/04/2022 42.8 (A) >60 mL/min/1.73 m^2 Final     eGFR if non    Date Value Ref Range Status   02/04/2022 37.0 (A) >60 mL/min/1.73 m^2 Final     Comment:     Calculation used to obtain the estimated glomerular filtration  rate (eGFR) is the CKD-EPI equation.        BNP  @LABRCNTIP(BNP,BNPTRIAGEBLO)@  @LABRCNTIP(troponini)@  CrCl cannot be calculated (Patient's most recent lab result is older than the maximum 7 days allowed.).  No results found in the last 24 hours.  No results found in the last 24 hours.  No results found in the last 24 hours.    Assessment:      1. Chronic systolic congestive heart failure    2. PVD (peripheral vascular disease)    3. PAC (premature atrial contraction)    4. Mixed hyperlipidemia    5. Primary hypertension    6. Coronary artery disease of native artery of native heart with stable angina pectoris    7. COPD, group C, by GOLD 2013 classification    8. Stage 3b chronic kidney disease    9. Morbid obesity    10. Tobacco user      CHFrEF compensated  CAD stable    Plan:   increase entresto to 49/51 mg bid  Check NT BNP and BMP    Continue ASA statin and toprolXK  Smoking cessation  DM Rx per PCP  Counseled DASH  Check Lipid profile in 6 months  Recommend heart-healthy diet, weight control and regular exercise.  Ken. Risk modification.   I have reviewed all pertinent labs and cardiac studies independently. Plans and recommendations have been formulated under my direct supervision. All questions answered and patient voiced understanding.   If symptoms persist go to the ED  RTC in 6 months

## 2022-06-10 NOTE — PROGRESS NOTES
HPI     Post-op Evaluation     Comments: PF QID   Damaso 128 QID     Pt states os feels fine and seeing great          Last edited by Jose Daniel Mirza on 6/10/2022  2:55 PM. (History)            Assessment /Plan     For exam results, see Encounter Report.    Post-operative state    Cataract extraction status of eye, left      Impression: 1 week post-op phaco with PCIOL OS : Doing well    Begin to taper Pred tid OS x 1 week, then bid OS x 1 week, then daily OS x 1 week, then stop  Stable mOCT today OS  Ok to resume normal activities and discontinue eye shield   RTC as scheduled for next post-op visit or PRN if any pain, redness, vision changes or any other concerns.  Discussed above and answered questions.

## 2022-06-22 ENCOUNTER — OFFICE VISIT (OUTPATIENT)
Dept: INTERNAL MEDICINE | Facility: CLINIC | Age: 72
End: 2022-06-22
Payer: MEDICARE

## 2022-06-22 ENCOUNTER — LAB VISIT (OUTPATIENT)
Dept: LAB | Facility: HOSPITAL | Age: 72
End: 2022-06-22
Attending: INTERNAL MEDICINE
Payer: MEDICARE

## 2022-06-22 VITALS
WEIGHT: 215.81 LBS | HEIGHT: 72 IN | BODY MASS INDEX: 29.23 KG/M2 | HEART RATE: 97 BPM | OXYGEN SATURATION: 97 % | TEMPERATURE: 98 F | SYSTOLIC BLOOD PRESSURE: 110 MMHG | DIASTOLIC BLOOD PRESSURE: 78 MMHG

## 2022-06-22 DIAGNOSIS — K42.9 UMBILICAL HERNIA WITHOUT OBSTRUCTION AND WITHOUT GANGRENE: Primary | ICD-10-CM

## 2022-06-22 DIAGNOSIS — I50.22 CHRONIC SYSTOLIC CONGESTIVE HEART FAILURE: ICD-10-CM

## 2022-06-22 DIAGNOSIS — K59.09 CONSTIPATION, CHRONIC: ICD-10-CM

## 2022-06-22 LAB
ANION GAP SERPL CALC-SCNC: 10 MMOL/L (ref 8–16)
BUN SERPL-MCNC: 45 MG/DL (ref 8–23)
CALCIUM SERPL-MCNC: 10.5 MG/DL (ref 8.7–10.5)
CHLORIDE SERPL-SCNC: 107 MMOL/L (ref 95–110)
CO2 SERPL-SCNC: 19 MMOL/L (ref 23–29)
CREAT SERPL-MCNC: 1.8 MG/DL (ref 0.5–1.4)
EST. GFR  (AFRICAN AMERICAN): 42.8 ML/MIN/1.73 M^2
EST. GFR  (NON AFRICAN AMERICAN): 37 ML/MIN/1.73 M^2
GLUCOSE SERPL-MCNC: 101 MG/DL (ref 70–110)
POTASSIUM SERPL-SCNC: 4.8 MMOL/L (ref 3.5–5.1)
SODIUM SERPL-SCNC: 136 MMOL/L (ref 136–145)

## 2022-06-22 PROCEDURE — 80048 BASIC METABOLIC PNL TOTAL CA: CPT | Performed by: INTERNAL MEDICINE

## 2022-06-22 PROCEDURE — 99214 OFFICE O/P EST MOD 30 MIN: CPT | Mod: S$GLB,,, | Performed by: FAMILY MEDICINE

## 2022-06-22 PROCEDURE — 1159F MED LIST DOCD IN RCRD: CPT | Mod: CPTII,S$GLB,, | Performed by: FAMILY MEDICINE

## 2022-06-22 PROCEDURE — 1160F PR REVIEW ALL MEDS BY PRESCRIBER/CLIN PHARMACIST DOCUMENTED: ICD-10-PCS | Mod: CPTII,S$GLB,, | Performed by: FAMILY MEDICINE

## 2022-06-22 PROCEDURE — 3288F FALL RISK ASSESSMENT DOCD: CPT | Mod: CPTII,S$GLB,, | Performed by: FAMILY MEDICINE

## 2022-06-22 PROCEDURE — 3078F DIAST BP <80 MM HG: CPT | Mod: CPTII,S$GLB,, | Performed by: FAMILY MEDICINE

## 2022-06-22 PROCEDURE — 4010F ACE/ARB THERAPY RXD/TAKEN: CPT | Mod: CPTII,S$GLB,, | Performed by: FAMILY MEDICINE

## 2022-06-22 PROCEDURE — 83880 ASSAY OF NATRIURETIC PEPTIDE: CPT | Performed by: INTERNAL MEDICINE

## 2022-06-22 PROCEDURE — 1101F PT FALLS ASSESS-DOCD LE1/YR: CPT | Mod: CPTII,S$GLB,, | Performed by: FAMILY MEDICINE

## 2022-06-22 PROCEDURE — 3008F PR BODY MASS INDEX (BMI) DOCUMENTED: ICD-10-PCS | Mod: CPTII,S$GLB,, | Performed by: FAMILY MEDICINE

## 2022-06-22 PROCEDURE — 3078F PR MOST RECENT DIASTOLIC BLOOD PRESSURE < 80 MM HG: ICD-10-PCS | Mod: CPTII,S$GLB,, | Performed by: FAMILY MEDICINE

## 2022-06-22 PROCEDURE — 4010F PR ACE/ARB THEARPY RXD/TAKEN: ICD-10-PCS | Mod: CPTII,S$GLB,, | Performed by: FAMILY MEDICINE

## 2022-06-22 PROCEDURE — 3008F BODY MASS INDEX DOCD: CPT | Mod: CPTII,S$GLB,, | Performed by: FAMILY MEDICINE

## 2022-06-22 PROCEDURE — 1160F RVW MEDS BY RX/DR IN RCRD: CPT | Mod: CPTII,S$GLB,, | Performed by: FAMILY MEDICINE

## 2022-06-22 PROCEDURE — 1101F PR PT FALLS ASSESS DOC 0-1 FALLS W/OUT INJ PAST YR: ICD-10-PCS | Mod: CPTII,S$GLB,, | Performed by: FAMILY MEDICINE

## 2022-06-22 PROCEDURE — 3074F PR MOST RECENT SYSTOLIC BLOOD PRESSURE < 130 MM HG: ICD-10-PCS | Mod: CPTII,S$GLB,, | Performed by: FAMILY MEDICINE

## 2022-06-22 PROCEDURE — 3074F SYST BP LT 130 MM HG: CPT | Mod: CPTII,S$GLB,, | Performed by: FAMILY MEDICINE

## 2022-06-22 PROCEDURE — 99999 PR PBB SHADOW E&M-EST. PATIENT-LVL V: CPT | Mod: PBBFAC,,, | Performed by: FAMILY MEDICINE

## 2022-06-22 PROCEDURE — 1125F AMNT PAIN NOTED PAIN PRSNT: CPT | Mod: CPTII,S$GLB,, | Performed by: FAMILY MEDICINE

## 2022-06-22 PROCEDURE — 99214 PR OFFICE/OUTPT VISIT, EST, LEVL IV, 30-39 MIN: ICD-10-PCS | Mod: S$GLB,,, | Performed by: FAMILY MEDICINE

## 2022-06-22 PROCEDURE — 1159F PR MEDICATION LIST DOCUMENTED IN MEDICAL RECORD: ICD-10-PCS | Mod: CPTII,S$GLB,, | Performed by: FAMILY MEDICINE

## 2022-06-22 PROCEDURE — 1125F PR PAIN SEVERITY QUANTIFIED, PAIN PRESENT: ICD-10-PCS | Mod: CPTII,S$GLB,, | Performed by: FAMILY MEDICINE

## 2022-06-22 PROCEDURE — 36415 COLL VENOUS BLD VENIPUNCTURE: CPT | Performed by: INTERNAL MEDICINE

## 2022-06-22 PROCEDURE — 3288F PR FALLS RISK ASSESSMENT DOCUMENTED: ICD-10-PCS | Mod: CPTII,S$GLB,, | Performed by: FAMILY MEDICINE

## 2022-06-22 PROCEDURE — 99999 PR PBB SHADOW E&M-EST. PATIENT-LVL V: ICD-10-PCS | Mod: PBBFAC,,, | Performed by: FAMILY MEDICINE

## 2022-06-22 RX ORDER — LEVOFLOXACIN 500 MG/1
500 TABLET, FILM COATED ORAL
COMMUNITY
End: 2023-04-20 | Stop reason: ALTCHOICE

## 2022-06-22 RX ORDER — POTASSIUM CHLORIDE 1500 MG/1
20 TABLET, EXTENDED RELEASE ORAL
COMMUNITY
End: 2023-04-28 | Stop reason: SDUPTHER

## 2022-06-22 RX ORDER — LACTULOSE 10 G/15ML
20 SOLUTION ORAL 2 TIMES DAILY
Qty: 1800 ML | Refills: 5 | Status: SHIPPED | OUTPATIENT
Start: 2022-06-22 | End: 2023-04-20

## 2022-06-22 RX ORDER — FLUTICASONE FUROATE, UMECLIDINIUM BROMIDE AND VILANTEROL TRIFENATATE 100; 62.5; 25 UG/1; UG/1; UG/1
1 POWDER RESPIRATORY (INHALATION) DAILY
COMMUNITY
Start: 2022-06-18

## 2022-06-22 NOTE — PROGRESS NOTES
Subjective:       Patient ID: Zohaib Wilson Sr. is a 71 y.o. male.    Chief Complaint: Hernia    Patient presents to clinic today with his son requesting referral for umbilical hernia.  Patient reports intermittent pain, but denies any severe pain today.  He also reports chronic constipation.  He is taking Metamucil and has been taking a friend's lactulose twice daily with good results.  Also requests renewal of handicap tag, reports uses cane to ambulate. He is otherwise without concerns today.    Review of Systems   Constitutional: Negative for chills, fatigue, fever and unexpected weight change.   Eyes: Negative for visual disturbance.   Respiratory: Negative for shortness of breath.    Cardiovascular: Negative for chest pain.   Gastrointestinal: Positive for abdominal pain and constipation. Negative for nausea and vomiting.   Musculoskeletal: Negative for myalgias.   Neurological: Negative for headaches.         Objective:      Physical Exam  Vitals reviewed.   Constitutional:       General: He is not in acute distress.     Appearance: He is well-developed.   HENT:      Head: Normocephalic and atraumatic.   Eyes:      General: Lids are normal. No scleral icterus.     Extraocular Movements: Extraocular movements intact.      Conjunctiva/sclera: Conjunctivae normal.      Pupils: Pupils are equal, round, and reactive to light.   Pulmonary:      Effort: Pulmonary effort is normal.   Abdominal:      Hernia: A hernia is present. Hernia is present in the umbilical area.      Comments: Umbilical hernia reducible, minimally tender, no induration   Neurological:      Mental Status: He is alert and oriented to person, place, and time.      Comments: Sitting in wheelchair   Psychiatric:         Mood and Affect: Mood and affect normal.         Assessment:       1. Umbilical hernia without obstruction and without gangrene    2. Constipation, chronic        Plan:   1. Umbilical hernia without obstruction and without  gangrene  -     Ambulatory referral/consult to General Surgery    2. Constipation, chronic  -     lactulose (CHRONULAC) 20 gram/30 mL Soln      Advised ER for severe/persistent pain, hardness/redness of hernia, fever or other concerning symptoms.  Health Maintenance reviewed/updated.  Discussed eligibility for covid booster, given scheduling information.  Handicap tag paperwork completed, indication 2.  Return in 1 month for annual/address other HM.  Patient expressed understanding and agreement with plan.

## 2022-06-22 NOTE — PATIENT INSTRUCTIONS
Moderna and Pfizer vaccine booster shots are approved for adults at increased risk at least six months after their initial immunization and Kel & Kel (J&J) COVID-19 vaccine booster shots are approved for all adults at least two months after their initial immunization.    Booster doses for all three COVID-19 vaccines, Pfizer, Moderna and Kel & Kel, are now available to the public and are being administered at [facility] vaccination locations.     If you received an mRNA vaccine (Pfizer or Moderna) it is recommended that you receive the same booster dose as your original vaccine series. However, all patients eligible for a booster dose may decide to mix and match your booster dose.     Below are the current mix and match options [facility] currently offers:    Pfizer Vaccine Recipients:  Booster Dose 6 months following 2nd dose can be, in order of recommendation:  Pfizer Booster Dose,  Moderna Booster Dose, or  Kel & Kel Booster Dose    Kel & Kel Vaccine Recipients:   Booster Dose 2 months following initial dose can be, in order of recommendation:  Pfizer Booster Dose,  Moderna Booster Dose, or  Kel & Kel Booster Dose    Both Pfizer and Kel & Kel boosters have the same dosage as the original vaccine regimen.    Moderna Vaccine Recipients:   Booster Dose 6 months following 2nd dose can be, in order of recommendation:  Moderna Booster Dose,  Pfizer Booster Dose, or  Kel & Kel Booster Dose    The Moderna booster is half the dosage of the original two-dose Moderna series, and [facility] will be following this guidance as outlined by the FDA and CDC.    International patients who have received a non-[place] approved COVID-19 vaccine are eligible for either a Pfizer booster dose or a Kel & Kel booster dose 28 days following their original vaccine dose.     Please schedule your appointment via [facility] or by calling [phone]. Walk-ins will also be  accepted as supply allows.    To learn more about COVID-19 vaccination and community vaccine locations, please visit [email or web address].

## 2022-06-24 LAB — NT-PROBNP SERPL IA-MCNC: 352 PG/ML

## 2022-06-27 ENCOUNTER — TELEPHONE (OUTPATIENT)
Dept: CARDIOLOGY | Facility: CLINIC | Age: 72
End: 2022-06-27
Payer: MEDICARE

## 2022-06-27 NOTE — TELEPHONE ENCOUNTER
Pt contacted and spoke with daughter verbalized understanding.          ----- Message from Micheal Wray MD sent at 6/26/2022  5:06 PM CDT -----  The lab showed CHF controlled  Kidney function improved

## 2022-07-05 ENCOUNTER — TELEPHONE (OUTPATIENT)
Dept: CARDIOLOGY | Facility: CLINIC | Age: 72
End: 2022-07-05
Payer: MEDICARE

## 2022-07-05 ENCOUNTER — OFFICE VISIT (OUTPATIENT)
Dept: SURGERY | Facility: CLINIC | Age: 72
End: 2022-07-05
Payer: MEDICARE

## 2022-07-05 ENCOUNTER — LAB VISIT (OUTPATIENT)
Dept: LAB | Facility: HOSPITAL | Age: 72
End: 2022-07-05
Attending: SURGERY
Payer: MEDICARE

## 2022-07-05 VITALS
BODY MASS INDEX: 29.3 KG/M2 | TEMPERATURE: 99 F | DIASTOLIC BLOOD PRESSURE: 73 MMHG | HEART RATE: 75 BPM | WEIGHT: 216.06 LBS | SYSTOLIC BLOOD PRESSURE: 118 MMHG

## 2022-07-05 DIAGNOSIS — K59.00 CONSTIPATION, UNSPECIFIED CONSTIPATION TYPE: ICD-10-CM

## 2022-07-05 DIAGNOSIS — K42.9 UMBILICAL HERNIA WITHOUT OBSTRUCTION AND WITHOUT GANGRENE: Primary | ICD-10-CM

## 2022-07-05 DIAGNOSIS — Z72.0 TOBACCO ABUSE: ICD-10-CM

## 2022-07-05 DIAGNOSIS — K42.9 UMBILICAL HERNIA WITHOUT OBSTRUCTION AND WITHOUT GANGRENE: ICD-10-CM

## 2022-07-05 LAB
ALBUMIN SERPL BCP-MCNC: 3.6 G/DL (ref 3.5–5.2)
ALP SERPL-CCNC: 93 U/L (ref 55–135)
ALT SERPL W/O P-5'-P-CCNC: 22 U/L (ref 10–44)
ANION GAP SERPL CALC-SCNC: 9 MMOL/L (ref 8–16)
AST SERPL-CCNC: 16 U/L (ref 10–40)
BASOPHILS # BLD AUTO: 0.05 K/UL (ref 0–0.2)
BASOPHILS NFR BLD: 0.7 % (ref 0–1.9)
BILIRUB SERPL-MCNC: 0.3 MG/DL (ref 0.1–1)
BUN SERPL-MCNC: 43 MG/DL (ref 8–23)
CALCIUM SERPL-MCNC: 12 MG/DL (ref 8.7–10.5)
CHLORIDE SERPL-SCNC: 104 MMOL/L (ref 95–110)
CO2 SERPL-SCNC: 26 MMOL/L (ref 23–29)
CREAT SERPL-MCNC: 2.2 MG/DL (ref 0.5–1.4)
DIFFERENTIAL METHOD: ABNORMAL
EOSINOPHIL # BLD AUTO: 0.1 K/UL (ref 0–0.5)
EOSINOPHIL NFR BLD: 1.6 % (ref 0–8)
ERYTHROCYTE [DISTWIDTH] IN BLOOD BY AUTOMATED COUNT: 13.6 % (ref 11.5–14.5)
EST. GFR  (AFRICAN AMERICAN): 33.6 ML/MIN/1.73 M^2
EST. GFR  (NON AFRICAN AMERICAN): 29.1 ML/MIN/1.73 M^2
GLUCOSE SERPL-MCNC: 96 MG/DL (ref 70–110)
HCT VFR BLD AUTO: 41 % (ref 40–54)
HGB BLD-MCNC: 13.7 G/DL (ref 14–18)
IMM GRANULOCYTES # BLD AUTO: 0.09 K/UL (ref 0–0.04)
IMM GRANULOCYTES NFR BLD AUTO: 1.2 % (ref 0–0.5)
LYMPHOCYTES # BLD AUTO: 2.1 K/UL (ref 1–4.8)
LYMPHOCYTES NFR BLD: 28.1 % (ref 18–48)
MCH RBC QN AUTO: 30 PG (ref 27–31)
MCHC RBC AUTO-ENTMCNC: 33.4 G/DL (ref 32–36)
MCV RBC AUTO: 90 FL (ref 82–98)
MONOCYTES # BLD AUTO: 0.8 K/UL (ref 0.3–1)
MONOCYTES NFR BLD: 10.3 % (ref 4–15)
NEUTROPHILS # BLD AUTO: 4.4 K/UL (ref 1.8–7.7)
NEUTROPHILS NFR BLD: 58.1 % (ref 38–73)
NRBC BLD-RTO: 0 /100 WBC
PLATELET # BLD AUTO: 231 K/UL (ref 150–450)
PMV BLD AUTO: 10.8 FL (ref 9.2–12.9)
POTASSIUM SERPL-SCNC: 5.1 MMOL/L (ref 3.5–5.1)
PROT SERPL-MCNC: 7.1 G/DL (ref 6–8.4)
RBC # BLD AUTO: 4.57 M/UL (ref 4.6–6.2)
SODIUM SERPL-SCNC: 139 MMOL/L (ref 136–145)
WBC # BLD AUTO: 7.57 K/UL (ref 3.9–12.7)

## 2022-07-05 PROCEDURE — 3078F PR MOST RECENT DIASTOLIC BLOOD PRESSURE < 80 MM HG: ICD-10-PCS | Mod: CPTII,S$GLB,, | Performed by: SURGERY

## 2022-07-05 PROCEDURE — 36415 COLL VENOUS BLD VENIPUNCTURE: CPT | Performed by: SURGERY

## 2022-07-05 PROCEDURE — 1159F PR MEDICATION LIST DOCUMENTED IN MEDICAL RECORD: ICD-10-PCS | Mod: CPTII,S$GLB,, | Performed by: SURGERY

## 2022-07-05 PROCEDURE — 1126F PR PAIN SEVERITY QUANTIFIED, NO PAIN PRESENT: ICD-10-PCS | Mod: CPTII,S$GLB,, | Performed by: SURGERY

## 2022-07-05 PROCEDURE — 3074F PR MOST RECENT SYSTOLIC BLOOD PRESSURE < 130 MM HG: ICD-10-PCS | Mod: CPTII,S$GLB,, | Performed by: SURGERY

## 2022-07-05 PROCEDURE — 80053 COMPREHEN METABOLIC PANEL: CPT | Performed by: SURGERY

## 2022-07-05 PROCEDURE — 99205 PR OFFICE/OUTPT VISIT, NEW, LEVL V, 60-74 MIN: ICD-10-PCS | Mod: S$GLB,,, | Performed by: SURGERY

## 2022-07-05 PROCEDURE — 99999 PR PBB SHADOW E&M-EST. PATIENT-LVL V: ICD-10-PCS | Mod: PBBFAC,,, | Performed by: SURGERY

## 2022-07-05 PROCEDURE — 99205 OFFICE O/P NEW HI 60 MIN: CPT | Mod: S$GLB,,, | Performed by: SURGERY

## 2022-07-05 PROCEDURE — 3008F PR BODY MASS INDEX (BMI) DOCUMENTED: ICD-10-PCS | Mod: CPTII,S$GLB,, | Performed by: SURGERY

## 2022-07-05 PROCEDURE — 85025 COMPLETE CBC W/AUTO DIFF WBC: CPT | Performed by: SURGERY

## 2022-07-05 PROCEDURE — 1159F MED LIST DOCD IN RCRD: CPT | Mod: CPTII,S$GLB,, | Performed by: SURGERY

## 2022-07-05 PROCEDURE — 4010F PR ACE/ARB THEARPY RXD/TAKEN: ICD-10-PCS | Mod: CPTII,S$GLB,, | Performed by: SURGERY

## 2022-07-05 PROCEDURE — 4010F ACE/ARB THERAPY RXD/TAKEN: CPT | Mod: CPTII,S$GLB,, | Performed by: SURGERY

## 2022-07-05 PROCEDURE — 3074F SYST BP LT 130 MM HG: CPT | Mod: CPTII,S$GLB,, | Performed by: SURGERY

## 2022-07-05 PROCEDURE — 3078F DIAST BP <80 MM HG: CPT | Mod: CPTII,S$GLB,, | Performed by: SURGERY

## 2022-07-05 PROCEDURE — 99999 PR PBB SHADOW E&M-EST. PATIENT-LVL V: CPT | Mod: PBBFAC,,, | Performed by: SURGERY

## 2022-07-05 PROCEDURE — 3008F BODY MASS INDEX DOCD: CPT | Mod: CPTII,S$GLB,, | Performed by: SURGERY

## 2022-07-05 PROCEDURE — 1126F AMNT PAIN NOTED NONE PRSNT: CPT | Mod: CPTII,S$GLB,, | Performed by: SURGERY

## 2022-07-05 NOTE — TELEPHONE ENCOUNTER
----- Message from Emma Ocasio MA sent at 7/5/2022  1:43 PM CDT -----  Please advise. Pt is having a hernia repair on the 20th daughter was seeing if he could be cleared.  ----- Message -----  From: Ileana Elias  Sent: 7/5/2022   1:35 PM CDT  To: [facility] Staff    Pt is returning nurse call. Pt can be reached at [phone]

## 2022-07-05 NOTE — TELEPHONE ENCOUNTER
Elevated periop risk of CV events for non-high risk procedure.  Good functional and exercise capacity.  No chest pain, active arrhythmia and CHF symptoms.  Ok to proceed the scheduled surgery without further cardiac study.  OK to hold Aspirin 5 to 7 days before the procedure and resume ASAP postop.  Avoid periop fluid overloaded.     No

## 2022-07-05 NOTE — H&P (VIEW-ONLY)
[facility] -   General Surgery History & Physical    SUBJECTIVE:     History of Present Illness:  Patient is a 71 y.o. male presents with a painful umbilical hernia. He states he has had the hernia for several years but recently it has become exquisitely painful.     Patient smokes a pack a day and has issues with chronic cough and constipation.      Review of patient's allergies indicates:   Allergen Reactions    Naproxen sodium Hives     Other reaction(s): hives    Iodine      Other reaction(s): hives       Current Outpatient Medications   Medication Sig Dispense Refill    albuterol (ACCUNEB) 1.25 mg/3 mL Nebu Take 3 mLs (1.25 mg total) by nebulization 2 (two) times a day. Rescue 540 mL 3    aspirin (ECOTRIN) 81 MG EC tablet Take 1 tablet (81 mg total) by mouth once daily.      atorvastatin (LIPITOR) 40 MG tablet Take 1 tablet (40 mg total) by mouth every evening. 90 tablet 3    GOLYTELY 236-22.74-6.74 -5.86 gram suspension Take by mouth.      lactulose (CHRONULAC) 20 gram/30 mL Soln Take 30 mLs (20 g total) by mouth 2 (two) times daily. 1800 mL 5    levoFLOXacin (LEVAQUIN) 500 MG tablet Take 500 mg by mouth.      levothyroxine (SYNTHROID) 25 MCG tablet 1 tablet      metoprolol succinate (TOPROL-XL) 25 MG 24 hr tablet TAKE 1 TABLET(25 MG) BY MOUTH EVERY DAY 30 tablet 5    potassium chloride (K-TAB) 20 mEq Take 20 mEq by mouth.      prednisoLONE acetate (PRED FORTE) 1 % DrpS Place 1 drop into the left eye 4 (four) times daily. 5 mL 1    sacubitriL-valsartan (ENTRESTO) 49-51 mg per tablet Take 1 tablet by mouth 2 (two) times daily. 60 tablet 5    SITagliptin (JANUVIA) 25 MG Tab Take 25 mg by mouth.      TRELEGY ELLIPTA 100-62.5-25 mcg DsDv Inhale 1 puff into the lungs once daily.      tamsulosin (FLOMAX) 0.4 mg Cap Take 0.4 mg by mouth.       No current facility-administered medications for this visit.       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  No family history on file.  Social History     Tobacco Use    Smoking status: Current Every Day Smoker     Packs/day: 1.50     Years: 60.00     Pack years: 90.00     Types: Cigarettes     Start date: 1963    Smokeless tobacco: Never Used   Substance Use Topics    Alcohol use: Not Currently    Drug use: Not Currently        Review of Systems:  Review of Systems   Constitutional: Negative for fever.   Respiratory: Positive for cough.    Gastrointestinal: Positive for abdominal pain and constipation.       OBJECTIVE:     Vital Signs (Most Recent)  Temp: 98.6 °F (37 °C) (07/05/22 1135)  Pulse: 75 (07/05/22 1135)  BP: 118/73 (07/05/22 1135)     98 kg (216 lb 0.8 oz)     Physical Exam:  Physical Exam  Constitutional:       Appearance: He is obese.   Cardiovascular:      Rate and Rhythm: Normal rate.   Pulmonary:      Effort: No respiratory distress.   Abdominal:      Palpations: Abdomen is soft.      Hernia: A hernia is present.      Comments: Tender, large umbilical hernia   Musculoskeletal:      Cervical back: No rigidity.   Neurological:      Mental Status: He is alert.         Laboratory  NT-proBNP 352  Chronic kidney disease (Cr 1.8-2.8 past year)  CBC wnl      Diagnostic Results:  EXAMINATION:  XR CHEST PA AND LATERAL     CLINICAL HISTORY:  Encounter for other preprocedural examination     TECHNIQUE:  PA and lateral views of the chest were performed.     COMPARISON:  11/15/2021     FINDINGS:  The cardiac and mediastinal silhouettes appear within normal limits.   Diffuse coarsening of the bronchovascular markings and pulmonary interstitium appears similar to multiple prior examinations.  No definite focal parenchymal consolidation or pleural effusion demonstrated.  No acute osseous findings  demonstrated.     Impression:     Unchanged appearance of the chest as above.  No acute findings    ASSESSMENT/PLAN:     Zohaib was seen today for hernia.    Diagnoses and all orders for this visit:    Umbilical hernia without obstruction and without gangrene  -     Ambulatory referral/consult to General Surgery  -     CBC Auto Differential; Future  -     Case Request Operating Room: XI ROBOTIC REPAIR, HERNIA, UMBILICAL  -     COMPREHENSIVE METABOLIC PANEL; Future    Constipation, unspecified constipation type    Tobacco abuse        Explained to the patient and his family that he is at high risk for hernia recurrence, wound infection, and cardiopulmonary complications due to his obesity, comorbidities, tobacco use, chronic cough, and chronic constipation. He states he understands and would like to have the surgery done because of how symptomatic and painful the hernia is.    We discussed smoking cessation and starting a bowel regimen. He is going to get cardiac clearance from his cardiologist.    Will plan for robotic umbilical hernia repair with mesh.  After explaining the risks, benefits, and alternatives, patient verbalized understanding and would like to proceed with surgery. All questions were answered to their satisfaction.      Patient expressed understanding and is in agreement.      Justine Sharp, DO  General Surgery  [facility]  7/5/2022

## 2022-07-14 NOTE — PRE ADMISSION SCREENING
Pre op instructions reviewed with Pt per phone: Spoke about pre op process and surgery instructions, verbalized understanding.    Surgery is scheduled on 7/20/22. Please arrive at 7:20am. We will call you the afternoon prior to surgery to confirm arrival time, as it is subject to change due to cancellations & emergencies.    Please report to the [facility] (1st Floor) at [facility] located off of [facility] (2nd building on the left, in front of the [facility] pole).  Address: [address] [place] [address] [place]. [address]    INSTRUCTIONS IMPORTANT!!!  Do Not Eat, Drink, or Smoke after 12 midnight! NO WATER after midnight! OK to brush teeth, no gum, candy or mints!      *Take only these medicines with a small swallow of water-morning of surgery.  Albuterol  Trelegy    ____  NO Acrylic/fake nails or nail polish worn day of surgery (specifically hand/arm & foot surgeries).  ____  NO powder, lotions, deodorants, oils or creams on body.  ____  Please Remove All jewelry & piercings prior to surgery.  ____  Please Remove Dentures, Hearing Aids & Contact Lens prior to the start of surgery.  ____  Please bring photo ID and insurance information to hospital (Leave Valuables at Home).  ____  If going home the same day, arrange for a ride home. You will not be able to drive 24 hrs if Anesthesia was used.   ____  Females (ages 11-60) may need to give a urine sample the morning of surgery; please see Pre op Nurse prior to voiding.  ____  Wear clean, loose fitting clothing. Allow for dressings, bandages.  ____  Stop all Aspirin products, Ibuprofen, Advil, Motrin & Aleve at least 5-7 days before surgery, unless otherwise instructed by your doctor, or the nurse.   ____  Blood Thinners are stopped based on your Provider's recommendation; Call Surgeon's Office to inquire when to stop/hold.  ____  Stop taking any Fish Oil supplements or Vitamins at least 5 days prior to surgery, unless instructed otherwise by your Doctor.             Diabetic Patients: If you take diabetic medication, do NOT take morning of surgery unless instructed by             Doctor. Metformin to be stopped 24 hrs prior to surgery time. DO NOT take long-acting insulin the evening before surgery. Blood sugars will be checked in pre-op morning of.    Bathing Instructions:    -Do not shave your face or body the day before or the day of surgery.  -Do not shave pubic hair 7 days prior to surgery (gyn pt's).   -Shower & Rinse your body as usual with anti-bacterial Soap (Dial, Lever 2000, or Hibiclens)   -Do not use Hibiclens on your head, face, or genitals.   -Do not wash with anti-bacterial soap after you use the Hibiclens.   -Rinse your body thoroughly.      [facility] Visitor/Ride Policy:  Only 2 adults allowed (over the age of 18) to accompany you to the Hospital. You Must have a ride home from a responsible adult that you know and trust. Medical Transport, Uber or Lyft can only be used if patient has a responsible adult to accompany them during ride home.    Post-Op Instructions: You will receive Post-op/Discharge instructions by your Discharge Nurse prior to going home. Please call your Surgeon's office with any post-surgery questions/concerns.    *Call [facility] with surgery instruction questions @ [phone] or [phone] (Mon-Fri 8 am to 4 pm)  *If you are running late or have questions the morning of surgery, please call the Surgery Dept @ [phone]  *Insurance/ Financial Questions, please call [phone].

## 2022-07-15 ENCOUNTER — TELEPHONE (OUTPATIENT)
Dept: PREADMISSION TESTING | Facility: HOSPITAL | Age: 72
End: 2022-07-15
Payer: MEDICARE

## 2022-07-15 NOTE — TELEPHONE ENCOUNTER
Called and spoke with Dr. Eldridge with Anesthesia Dept regarding pt health history, specifically the patient's elevated BUN and Creatinine levels. Anesthesia response: okay to proceed with surgery. Patient scheduled for surgery on 7/20/22.

## 2022-07-19 ENCOUNTER — TELEPHONE (OUTPATIENT)
Dept: PREADMISSION TESTING | Facility: HOSPITAL | Age: 72
End: 2022-07-19
Payer: MEDICARE

## 2022-07-19 ENCOUNTER — ANESTHESIA EVENT (OUTPATIENT)
Dept: SURGERY | Facility: HOSPITAL | Age: 72
End: 2022-07-19
Payer: MEDICARE

## 2022-07-19 NOTE — ANESTHESIA PREPROCEDURE EVALUATION
07/19/2022  Zohaib Wilson Sr. is a 71 y.o., male.      Pre-op Assessment    I have reviewed the Patient Summary Reports.     I have reviewed the Nursing Notes. I have reviewed the NPO Status.      Review of Systems  Anesthesia Hx:  No problems with previous Anesthesia    Social:  Smoker    Hematology/Oncology:  Hematology Normal   Oncology Normal     EENT/Dental:EENT/Dental Normal   Cardiovascular:   Hypertension, well controlled Past MI CAD   CHF PVD ECG has been reviewed.    Pulmonary:   COPD Sleep Apnea    Renal/:   Chronic Renal Disease, CRI CKD stage 3b   Hepatic/GI:  Hepatic/GI Normal    Musculoskeletal:  Musculoskeletal Normal    Neurological:  Neurology Normal    Endocrine:  Endocrine Normal  Obesity / BMI > 30  Dermatological:  Skin Normal    Psych:  Psychiatric Normal           Physical Exam  General: Well nourished, Alert, Cooperative and Oriented    Airway:  Mallampati: III / I  Mouth Opening: Normal  TM Distance: Normal  Tongue: Normal  Neck ROM: Normal ROM    Dental:  Intact, Periodontal disease  Multiple missing and chipped/broken    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 No pertinent family history.    Social History     Socioeconomic History    Marital status:    Tobacco Use    Smoking status: Current Every Day Smoker     Packs/day: 1.50     Years: 60.00     Pack years: 90.00     Types: Cigarettes     Start date: 1963    Smokeless tobacco: Never Used   Substance and Sexual Activity     Alcohol use: Not Currently    Drug use: Not Currently    Sexual activity: Not Currently       No current facility-administered medications for this encounter.     Current Outpatient Medications   Medication Sig Dispense Refill    albuterol (ACCUNEB) 1.25 mg/3 mL Nebu Take 3 mLs (1.25 mg total) by nebulization 2 (two) times a day. Rescue 540 mL 3    aspirin (ECOTRIN) 81 MG EC tablet Take 1 tablet (81 mg total) by mouth once daily.      atorvastatin (LIPITOR) 40 MG tablet Take 1 tablet (40 mg total) by mouth every evening. 90 tablet 3    lactulose (CHRONULAC) 20 gram/30 mL Soln Take 30 mLs (20 g total) by mouth 2 (two) times daily. 1800 mL 5    sacubitriL-valsartan (ENTRESTO) 49-51 mg per tablet Take 1 tablet by mouth 2 (two) times daily. 60 tablet 5    tamsulosin (FLOMAX) 0.4 mg Cap Take 0.4 mg by mouth.      TRELEGY ELLIPTA 100-62.5-25 mcg DsDv Inhale 1 puff into the lungs once daily.      GOLYTELY 236-22.74-6.74 -5.86 gram suspension Take by mouth.      levoFLOXacin (LEVAQUIN) 500 MG tablet Take 500 mg by mouth.      levothyroxine (SYNTHROID) 25 MCG tablet Take 25 mcg by mouth before breakfast.      metoprolol succinate (TOPROL-XL) 25 MG 24 hr tablet TAKE 1 TABLET(25 MG) BY MOUTH EVERY DAY 30 tablet 5    potassium chloride (K-TAB) 20 mEq Take 20 mEq by mouth.      prednisoLONE acetate (PRED FORTE) 1 % DrpS Place 1 drop into the left eye 4 (four) times daily. 5 mL 1    SITagliptin (JANUVIA) 25 MG Tab Take 25 mg by mouth.         Review of patient's allergies indicates:   Allergen Reactions    Naproxen sodium Hives     Other reaction(s): hives    Iodine      Other reaction(s): hives       Anesthesia Plan  Type of Anesthesia, risks & benefits discussed:    Anesthesia Type: Gen ETT  Intra-op Monitoring Plan: Standard ASA Monitors  Post Op Pain Control Plan: IV/PO Opioids PRN  Induction:  IV  Airway Plan: Direct  Informed Consent: Informed consent signed with the Patient and all parties understand the  risks and agree with anesthesia plan.  All questions answered.   ASA Score: 3    Ready For Surgery From Anesthesia Perspective.     Lab Results   Component Value Date    WBC 7.57 07/05/2022    HGB 13.7 (L) 07/05/2022    HCT 41.0 07/05/2022    MCV 90 07/05/2022     07/05/2022         Chemistry        Component Value Date/Time     07/05/2022 1235    K 5.1 07/05/2022 1235     07/05/2022 1235    CO2 26 07/05/2022 1235    BUN 43 (H) 07/05/2022 1235    CREATININE 2.2 (H) 07/05/2022 1235    GLU 96 07/05/2022 1235        Component Value Date/Time    CALCIUM 12.0 (H) 07/05/2022 1235    ALKPHOS 93 07/05/2022 1235    AST 16 07/05/2022 1235    ALT 22 07/05/2022 1235    BILITOT 0.3 07/05/2022 1235    ESTGFRAFRICA 33.6 (A) 07/05/2022 1235    EGFRNONAA 29.1 (A) 07/05/2022 1235          EKG  Sinus rhythm with occasional Premature ventricular complexes and Premature   atrial complexes   Left axis deviation   Incomplete right bundle branch block   Abnormal ECG   When compared with ECG of 06-DEC-2021 09:28,   Premature ventricular complexes are now Present   Confirmed by OSMAR BRIZUELA MD ([id number]) on 2/4/2022 2:49:44 PM     ECHO 101/21/21  Summary    · Concentric hypertrophy and mildly decreased systolic function.  · Mild left atrial enlargement.  · Mild pulmonic regurgitation.  · The estimated ejection fraction is 40%.  · Grade I left ventricular diastolic dysfunction.  · With normal right ventricular systolic function.  · Mild aortic regurgitation.  · Normal central venous pressure (3 mmHg).  · The estimated PA systolic pressure is 38 mmHg.  · There is mild left ventricular global hypokinesis.      .

## 2022-07-19 NOTE — TELEPHONE ENCOUNTER
Called and spoke with Pt's son about the following; verbalized understanding.    Please arrive to [facility] main lobby at 7:45am on 7/20/22 for your scheduled procedure. NOTHING to eat or drink after midnight, except medications instructed by the Pre-admit Nurse.

## 2022-07-20 ENCOUNTER — ANESTHESIA (OUTPATIENT)
Dept: SURGERY | Facility: HOSPITAL | Age: 72
End: 2022-07-20
Payer: MEDICARE

## 2022-07-20 ENCOUNTER — HOSPITAL ENCOUNTER (OUTPATIENT)
Facility: HOSPITAL | Age: 72
Discharge: HOME | End: 2022-07-20
Attending: SURGERY | Admitting: SURGERY
Payer: MEDICARE

## 2022-07-20 PROCEDURE — 71000039 HC RECOVERY, EACH ADD'L HOUR: Performed by: SURGERY

## 2022-07-20 PROCEDURE — 63600175 PHARM REV CODE 636 W HCPCS: Performed by: ANESTHESIOLOGY

## 2022-07-20 PROCEDURE — 49652 PR LAP VENTRAL/UMBILICAL HERNIA; REDUCIBLE: ICD-10-PCS | Mod: ,,, | Performed by: SURGERY

## 2022-07-20 PROCEDURE — 37000009 HC ANESTHESIA EA ADD 15 MINS: Performed by: SURGERY

## 2022-07-20 PROCEDURE — 71000015 HC POSTOP RECOV 1ST HR: Performed by: SURGERY

## 2022-07-20 PROCEDURE — 27201423 OPTIME MED/SURG SUP & DEVICES STERILE SUPPLY: Performed by: SURGERY

## 2022-07-20 PROCEDURE — 36000711: Performed by: SURGERY

## 2022-07-20 PROCEDURE — C1781 MESH (IMPLANTABLE): HCPCS | Performed by: SURGERY

## 2022-07-20 PROCEDURE — 25000003 PHARM REV CODE 250: Performed by: ANESTHESIOLOGY

## 2022-07-20 PROCEDURE — 25000242 PHARM REV CODE 250 ALT 637 W/ HCPCS: Performed by: ANESTHESIOLOGY

## 2022-07-20 PROCEDURE — 63600175 PHARM REV CODE 636 W HCPCS: Performed by: NURSE ANESTHETIST, CERTIFIED REGISTERED

## 2022-07-20 PROCEDURE — 71000033 HC RECOVERY, INTIAL HOUR: Performed by: SURGERY

## 2022-07-20 PROCEDURE — 49652 PR LAP VENTRAL/UMBILICAL HERNIA; REDUCIBLE: CPT | Mod: ,,, | Performed by: SURGERY

## 2022-07-20 PROCEDURE — 37000008 HC ANESTHESIA 1ST 15 MINUTES: Performed by: SURGERY

## 2022-07-20 PROCEDURE — 25000003 PHARM REV CODE 250: Performed by: SURGERY

## 2022-07-20 PROCEDURE — 36000710: Performed by: SURGERY

## 2022-07-20 PROCEDURE — 25000003 PHARM REV CODE 250: Performed by: NURSE ANESTHETIST, CERTIFIED REGISTERED

## 2022-07-20 DEVICE — COMPOSITE MESH,MONOFILAMENT POLYESTER WITH ABSORBABLE COLLAGEN FILM AND MARKING
Type: IMPLANTABLE DEVICE | Site: UMBILICAL | Status: FUNCTIONAL
Brand: SYMBOTEX

## 2022-07-20 RX ORDER — PROPOFOL 10 MG/ML
VIAL (ML) INTRAVENOUS
Status: DISCONTINUED | OUTPATIENT
Start: 2022-07-20 | End: 2022-07-20

## 2022-07-20 RX ORDER — SODIUM CHLORIDE 0.9 % (FLUSH) 0.9 %
10 SYRINGE (ML) INJECTION
Status: DISCONTINUED | OUTPATIENT
Start: 2022-07-20 | End: 2022-07-20 | Stop reason: HOSPADM

## 2022-07-20 RX ORDER — PHENYLEPHRINE HYDROCHLORIDE 10 MG/ML
INJECTION INTRAVENOUS
Status: DISCONTINUED | OUTPATIENT
Start: 2022-07-20 | End: 2022-07-20

## 2022-07-20 RX ORDER — BUPIVACAINE HYDROCHLORIDE 2.5 MG/ML
INJECTION, SOLUTION EPIDURAL; INFILTRATION; INTRACAUDAL
Status: DISCONTINUED | OUTPATIENT
Start: 2022-07-20 | End: 2022-07-20 | Stop reason: HOSPADM

## 2022-07-20 RX ORDER — FENTANYL CITRATE 50 UG/ML
INJECTION, SOLUTION INTRAMUSCULAR; INTRAVENOUS
Status: DISCONTINUED | OUTPATIENT
Start: 2022-07-20 | End: 2022-07-20

## 2022-07-20 RX ORDER — LIDOCAINE HYDROCHLORIDE 10 MG/ML
INJECTION, SOLUTION EPIDURAL; INFILTRATION; INTRACAUDAL; PERINEURAL
Status: DISCONTINUED | OUTPATIENT
Start: 2022-07-20 | End: 2022-07-20

## 2022-07-20 RX ORDER — ALBUTEROL SULFATE 0.83 MG/ML
2.5 SOLUTION RESPIRATORY (INHALATION) EVERY 6 HOURS PRN
Status: DISCONTINUED | OUTPATIENT
Start: 2022-07-20 | End: 2022-07-20 | Stop reason: HOSPADM

## 2022-07-20 RX ORDER — ONDANSETRON 2 MG/ML
INJECTION INTRAMUSCULAR; INTRAVENOUS
Status: DISCONTINUED | OUTPATIENT
Start: 2022-07-20 | End: 2022-07-20

## 2022-07-20 RX ORDER — OXYCODONE HYDROCHLORIDE 5 MG/1
5 TABLET ORAL
Status: DISCONTINUED | OUTPATIENT
Start: 2022-07-20 | End: 2022-07-20 | Stop reason: HOSPADM

## 2022-07-20 RX ORDER — CEFAZOLIN SODIUM 2 G/50ML
2 SOLUTION INTRAVENOUS ONCE
Status: DISCONTINUED | OUTPATIENT
Start: 2022-07-20 | End: 2022-07-20 | Stop reason: HOSPADM

## 2022-07-20 RX ORDER — METHOCARBAMOL 750 MG/1
750 TABLET, FILM COATED ORAL EVERY 8 HOURS PRN
Qty: 21 TABLET | Refills: 1 | Status: SHIPPED | OUTPATIENT
Start: 2022-07-20 | End: 2023-04-20

## 2022-07-20 RX ORDER — ROCURONIUM BROMIDE 10 MG/ML
INJECTION, SOLUTION INTRAVENOUS
Status: DISCONTINUED | OUTPATIENT
Start: 2022-07-20 | End: 2022-07-20

## 2022-07-20 RX ORDER — NEOSTIGMINE METHYLSULFATE 1 MG/ML
INJECTION, SOLUTION INTRAVENOUS
Status: DISCONTINUED | OUTPATIENT
Start: 2022-07-20 | End: 2022-07-20

## 2022-07-20 RX ORDER — ONDANSETRON 4 MG/1
4 TABLET, FILM COATED ORAL EVERY 6 HOURS PRN
Qty: 10 TABLET | Refills: 1 | Status: SHIPPED | OUTPATIENT
Start: 2022-07-20 | End: 2023-04-20 | Stop reason: ALTCHOICE

## 2022-07-20 RX ORDER — OXYCODONE AND ACETAMINOPHEN 5; 325 MG/1; MG/1
1 TABLET ORAL EVERY 4 HOURS PRN
Qty: 25 TABLET | Refills: 0 | Status: SHIPPED | OUTPATIENT
Start: 2022-07-20 | End: 2023-04-20

## 2022-07-20 RX ORDER — HYDROMORPHONE HYDROCHLORIDE 2 MG/ML
0.2 INJECTION, SOLUTION INTRAMUSCULAR; INTRAVENOUS; SUBCUTANEOUS EVERY 5 MIN PRN
Status: DISCONTINUED | OUTPATIENT
Start: 2022-07-20 | End: 2022-07-20 | Stop reason: HOSPADM

## 2022-07-20 RX ORDER — DOCUSATE SODIUM 100 MG/1
100 CAPSULE, LIQUID FILLED ORAL 2 TIMES DAILY
Qty: 60 CAPSULE | Refills: 1 | Status: SHIPPED | OUTPATIENT
Start: 2022-07-20

## 2022-07-20 RX ORDER — ONDANSETRON 2 MG/ML
4 INJECTION INTRAMUSCULAR; INTRAVENOUS DAILY PRN
Status: DISCONTINUED | OUTPATIENT
Start: 2022-07-20 | End: 2022-07-20 | Stop reason: HOSPADM

## 2022-07-20 RX ADMIN — SUGAMMADEX 200 MG: 100 INJECTION, SOLUTION INTRAVENOUS at 11:07

## 2022-07-20 RX ADMIN — ROCURONIUM BROMIDE 50 MG: 10 INJECTION, SOLUTION INTRAVENOUS at 09:07

## 2022-07-20 RX ADMIN — GLYCOPYRROLATE 0.6 MG: 0.2 INJECTION, SOLUTION INTRAMUSCULAR; INTRAVENOUS at 11:07

## 2022-07-20 RX ADMIN — ONDANSETRON 4 MG: 2 INJECTION, SOLUTION INTRAMUSCULAR; INTRAVENOUS at 11:07

## 2022-07-20 RX ADMIN — PHENYLEPHRINE HYDROCHLORIDE 100 MCG: 10 INJECTION INTRAVENOUS at 10:07

## 2022-07-20 RX ADMIN — SODIUM CHLORIDE, POTASSIUM CHLORIDE, SODIUM LACTATE AND CALCIUM CHLORIDE: 600; 310; 30; 20 INJECTION, SOLUTION INTRAVENOUS at 09:07

## 2022-07-20 RX ADMIN — OXYCODONE HYDROCHLORIDE 5 MG: 5 TABLET ORAL at 01:07

## 2022-07-20 RX ADMIN — LIDOCAINE HYDROCHLORIDE 50 MG: 10 INJECTION, SOLUTION EPIDURAL; INFILTRATION; INTRACAUDAL; PERINEURAL at 09:07

## 2022-07-20 RX ADMIN — ROCURONIUM BROMIDE 10 MG: 10 INJECTION, SOLUTION INTRAVENOUS at 10:07

## 2022-07-20 RX ADMIN — NEOSTIGMINE METHYLSULFATE 4 MG: 1 INJECTION INTRAVENOUS at 11:07

## 2022-07-20 RX ADMIN — CEFAZOLIN 2 G: 1 INJECTION, POWDER, FOR SOLUTION INTRAMUSCULAR; INTRAVENOUS at 09:07

## 2022-07-20 RX ADMIN — ALBUTEROL SULFATE 2.5 MG: 2.5 SOLUTION RESPIRATORY (INHALATION) at 12:07

## 2022-07-20 RX ADMIN — FENTANYL CITRATE 50 MCG: 50 INJECTION, SOLUTION INTRAMUSCULAR; INTRAVENOUS at 11:07

## 2022-07-20 RX ADMIN — PROPOFOL 80 MG: 10 INJECTION, EMULSION INTRAVENOUS at 09:07

## 2022-07-20 RX ADMIN — ROCURONIUM BROMIDE 10 MG: 10 INJECTION, SOLUTION INTRAVENOUS at 11:07

## 2022-07-20 RX ADMIN — FENTANYL CITRATE 50 MCG: 50 INJECTION, SOLUTION INTRAMUSCULAR; INTRAVENOUS at 09:07

## 2022-07-20 RX ADMIN — PHENYLEPHRINE HYDROCHLORIDE 100 MCG: 10 INJECTION INTRAVENOUS at 09:07

## 2022-07-20 NOTE — OP NOTE
Zohaib Wilson Sr.  : 1950, MRN: [id number]  Date of procedure: 2022      Procedure: DaVinci-assisted laparoscopic umbilical hernia repair with mesh    Pre-procedure diagnosis: Umbilical hernia  Post-procedure diagnosis: Same  Surgeon: Justine Sharp DO  Assistant: None  Anesthesia: General  EBL: 20 mL  Implants/Drains: 9 cm round Symbotex mesh  Specimen: None  Complications: None apparent    Significant findings: Omental fat-containing umbilical hernia    Indications for procedure: The patient presents with a painful umbilical hernia. After explaining the risks, benefits, and alternatives, the patient verbalized understanding and informed written consent was obtained. All questions were answered to their satisfaction.    Description of procedure: The patient was brought to the OR and placed in the supine position. After general anesthesia was induced by the Anesthesia Department, the abdomen was prepped and draped in usual sterile fashion. A time out was taken to identify the correct patient, correct procedure, and correct anatomical site; all parties were in agreement.  Local anesthetic was injected into the skin. An 8 mm incision was made at Frost's point and a Veress needle was inserted; the abdomen was insufflated to 15 mm Hg. An 8 mm robotic trochar was inserted under direct visualization. Next, two 8 mm trochars were inserted in the left lateral and left lower quadrant of the abdomen under direct visualization. The [employer] robot was then docked.  A peritoneal flap was created laterally to the defect and extended cephalad and caudally using the robotic mary. The flap was dissected medially towards the defect and the hernia sac was reduced. The defect was then closed with a running 0 Stratafix suture. The center of the mesh was placed directly below the defect, making sure that there was at least 4 cm of coverage past the defect in all directions. The mesh was then sutured to the anterior  abdominal wall with a running 2-0 Stratafix along its circumference. The peritoneal flap was then closed with a running 2-0 Stratafix suture. Good hemostasis was observed.  The abdomen was desufflated. The port site incisions were closed with 4-0 monocryl in the subcuticular layer and Dermabond was applied on top of the skin.    All sponge and instrument counts were deemed correct. The patient appeared to tolerate the procedure well and there were no apparent complications. The patient was transported to PACU in stable condition.    Justine Sharp,   General Surgery  [facility]  7/20/2022

## 2022-07-20 NOTE — TRANSFER OF CARE
"Anesthesia Transfer of Care Note    Patient: Zohaib Wilson     Procedure(s) Performed: Procedure(s) (LRB):  XI ROBOTIC REPAIR, HERNIA, UMBILICAL (N/A)    Patient location: PACU    Anesthesia Type: general    Transport from OR: Transported from OR on 6-10 L/min O2 by face mask with adequate spontaneous ventilation    Post pain: adequate analgesia    Post assessment: no apparent anesthetic complications and tolerated procedure well    Post vital signs: stable    Level of consciousness: awake, alert and oriented    Nausea/Vomiting: no nausea/vomiting    Complications: none    Transfer of care protocol was followed      Last vitals:   Visit Vitals  /68 (BP Location: Right arm, Patient Position: Lying)   Pulse 88   Temp 36.4 °C (97.5 °F) (Temporal)   Resp (!) 27   Ht 6' 2" (1.88 m)   Wt 99.5 kg (219 lb 4 oz)   SpO2 (!) 93%   BMI 28.15 kg/m²     "

## 2022-07-20 NOTE — ANESTHESIA POSTPROCEDURE EVALUATION
Anesthesia Post Evaluation    Patient: Zohaib Wilson Sr.    Procedure(s) Performed: Procedure(s) (LRB):  XI ROBOTIC REPAIR, HERNIA, UMBILICAL (N/A)    Final Anesthesia Type: general      Patient location during evaluation: PACU  Patient participation: Yes- Able to Participate  Level of consciousness: awake and alert, awake and oriented  Post-procedure vital signs: reviewed and stable  Pain management: adequate  Airway patency: patent    PONV status at discharge: No PONV  Anesthetic complications: no      Cardiovascular status: blood pressure returned to baseline  Respiratory status: unassisted, spontaneous ventilation and room air  Hydration status: euvolemic  Follow-up not needed.          Vitals Value Taken Time   BP 88/51 07/20/22 1201   Temp 36.4 °C (97.5 °F) 07/20/22 1200   Pulse 86 07/20/22 1203   Resp 31 07/20/22 1203   SpO2 96 % 07/20/22 1203   Vitals shown include unvalidated device data.      No case tracking events are documented in the log.      Pain/Ainta Score: No data recorded

## 2022-07-20 NOTE — ANESTHESIA PROCEDURE NOTES
Intubation    Date/Time: 7/20/2022 9:27 AM  Performed by: Vern White CRNA  Authorized by: Edilma Cortez MD     Intubation:     Induction:  Intravenous    Intubated:  Postinduction    Mask Ventilation:  Easy mask    Attempts:  1    Attempted By:  CRNA    Method of Intubation:  Direct    Blade:  Ginny 4    Laryngeal View Grade: Grade I - full view of cords      Difficult Airway Encountered?: No      Complications:  None    Airway Device:  Oral endotracheal tube    Airway Device Size:  7.5    Style/Cuff Inflation:  Cuffed    Tube secured:  20    Secured at:  The lips    Placement Verified By:  Capnometry    Complicating Factors:  None    Findings Post-Intubation:  BS equal bilateral

## 2022-07-20 NOTE — PATIENT INSTRUCTIONS
Discharge Instructions    Hygiene and incision care:   You may shower but do not soak such as in a bathtub or pool. Your incisions are closed with absorbable sutures and there is surgical glue on top. The glue will eventually flake off on its own. Do not scrub your incisions, just allow warm soapy water to run over them.   If you develop fevers, worsening redness and/or pus-like drainage, call the office immediately.    Pain control:   You may take Tylenol (650 mg every 4 hours) and ibuprofen (600 mg every 6 hours) as well as alternate heat and cold packs for pain and swelling. Take ibuprofen with food as it can cause stomach upset and ulcers. Do not take ibuprofen if you have an increased risk of bleeding, history of stomach ulcers, are already taking an NSAID, or have kidney issues.   If taking narcotic pain medication, such as Norco (hydrocodone-acetaminophen) or Percocet (oxycodone-acetaminophen), do not drink alcohol or drive. Each Norco and Percocet tablet contains 325 mg of Tylenol; do not take more than 4000 mg of Tylenol per day. Narcotic pain medications can be constipating so be sure to drink plenty of water and take an over the counter stool softener such as colace (100 mg twice a day) or miralax (17 g once a day).    Activity:   No heavy lifting >10 lbs for 6 weeks while your incisions are healing as it might result in a hernia. Avoid straining, pushing, and pulling. It is okay to walk and slowly go up and down stairs.   Make sure to do leg and ankle exercises and take deep breaths frequently to avoid developing blood clots or pneumonia.    Diet:   You may resume your regular diet. Some people find it best to stick to soft, bland, and easily digestible foods for the first couple of days while the anesthesia is leaving their system or if they're taking narcotic pain medicine to avoid nausea and vomiting. Be sure to eat good, nutritious foods such as vegetables and lean proteins to give your body the  nutrients it needs to heal. I recommend also taking vitamin C as this can aid with wound healing.    For any questions or concerns, please do not hesitate to contact the office any time at [phone] or send me a [place] message.

## 2022-07-21 ENCOUNTER — HOSPITAL ENCOUNTER (EMERGENCY)
Facility: HOSPITAL | Age: 72
Discharge: HOME | End: 2022-07-21
Attending: EMERGENCY MEDICINE
Payer: MEDICARE

## 2022-07-21 VITALS
RESPIRATION RATE: 18 BRPM | DIASTOLIC BLOOD PRESSURE: 55 MMHG | OXYGEN SATURATION: 94 % | TEMPERATURE: 98 F | SYSTOLIC BLOOD PRESSURE: 107 MMHG | HEART RATE: 78 BPM

## 2022-07-21 VITALS
TEMPERATURE: 97 F | WEIGHT: 219.25 LBS | SYSTOLIC BLOOD PRESSURE: 110 MMHG | HEIGHT: 74 IN | BODY MASS INDEX: 28.14 KG/M2 | OXYGEN SATURATION: 92 % | RESPIRATION RATE: 25 BRPM | DIASTOLIC BLOOD PRESSURE: 57 MMHG | HEART RATE: 69 BPM

## 2022-07-21 DIAGNOSIS — R33.9 URINARY RETENTION: Primary | ICD-10-CM

## 2022-07-21 PROCEDURE — 99283 EMERGENCY DEPT VISIT LOW MDM: CPT | Mod: 25

## 2022-07-21 PROCEDURE — 51702 INSERT TEMP BLADDER CATH: CPT

## 2022-07-21 NOTE — DISCHARGE SUMMARY
[facility] - Surgery (Hospital)  Discharge Note  Short Stay    Procedure(s) (LRB):  XI ROBOTIC REPAIR, HERNIA, UMBILICAL (N/A)    OUTCOME: Patient tolerated treatment/procedure well without complication and is now ready for discharge.    DISPOSITION: Home or Self Care    FINAL DIAGNOSIS:  <principal problem not specified>    FOLLOWUP: In clinic    DISCHARGE INSTRUCTIONS:  No discharge procedures on file.      Clinical Reference Documents Added to Patient Instructions       Document    ABDOMINAL HERNIA REPAIR DISCHARGE INSTRUCTIONS (ENGLISH)    OXYCODONE AND ACETAMINOPHEN, ADULT (ENGLISH)          TIME SPENT ON DISCHARGE: 5 minutes

## 2022-07-21 NOTE — ED PROVIDER NOTES
SCRIBE #1 NOTE: I, Geo Balderrama, am scribing for, and in the presence of, Karla Patel MD. I have scribed the entire note.       History     Chief Complaint   Patient presents with    Urinary Retention     Had hernia repair yesterday.  Having trouble urinating since.        Review of patient's allergies indicates:   Allergen Reactions    Naproxen sodium Hives     Other reaction(s): hives    Iodine      Other reaction(s): hives         History of Present Illness     HPI    7/21/2022, 4:31 AM  History obtained from the patient      History of Present Illness: Zohaib Wilson Sr. is a 71 y.o. male patient with a PMHx of hernias who presents to the Emergency Department for evaluation of urinary retention which onset gradually x 1 day. Pt says he has not urinated since last night. Symptoms are constant and moderate in severity. No mitigating or exacerbating factors reported. Associated sxs include abdominal pain. Patient denies any CP, SOB, fever, chills, n/v, dizziness, and all other sxs at this time. No prior Tx reported. No further complaints or concerns at this time.       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    Social History:  Social History     Tobacco Use     Smoking status: Current Every Day Smoker     Packs/day: 1.50     Years: 60.00     Pack years: 90.00     Types: Cigarettes     Start date: 1963    Smokeless tobacco: Never Used   Substance and Sexual Activity    Alcohol use: Not Currently    Drug use: Not Currently    Sexual activity: Not Currently        Review of Systems     Review of Systems   Constitutional: Negative for chills, fever and unexpected weight change.   HENT: Negative for sore throat.    Respiratory: Negative for shortness of breath.    Cardiovascular: Negative for chest pain.   Gastrointestinal: Positive for abdominal pain. Negative for nausea and vomiting.   Genitourinary: Positive for decreased urine volume and dysuria.   Musculoskeletal: Negative for back pain.   Skin: Negative for rash.   Neurological: Negative for dizziness and weakness.   Hematological: Does not bruise/bleed easily.   All other systems reviewed and are negative.       Physical Exam     Initial Vitals [07/21/22 0357]   BP Pulse Resp Temp SpO2   122/88 84 15 98 °F (36.7 °C) 95 %      MAP       --          Physical Exam  Nursing Notes and Vital Signs Reviewed.  Constitutional: Patient is in mild distress. Well-developed and well-nourished.  Head: Atraumatic. Normocephalic.  Eyes: PERRL. EOM intact. Conjunctivae are not pale. No scleral icterus.  ENT: Mucous membranes are moist. Oropharynx is clear and symmetric.    Neck: Supple. Full ROM. No lymphadenopathy.  Cardiovascular: Regular rate. Regular rhythm. No murmurs, rubs, or gallops. Distal pulses are 2+ and symmetric.  Pulmonary/Chest: No respiratory distress. Clear to auscultation bilaterally. No wheezing or rales.  Abdominal: Soft and non-distended.  There is no tenderness.  No rebound, guarding, or rigidity. Good bowel sounds.  Genitourinary: No CVA tenderness  Musculoskeletal: Moves all extremities. No obvious deformities. No edema. No calf tenderness.  Skin: Warm and dry.  Neurological:  Alert, awake, and appropriate.   Normal speech.  No acute focal neurological deficits are appreciated.  Psychiatric: Normal affect. Good eye contact. Appropriate in content.     ED Course   Procedures  ED Vital Signs:  Vitals:    07/21/22 0357 07/21/22 0415 07/21/22 0515 07/21/22 0545   BP: 122/88 138/65 (!) 103/53 (!) 107/55   Pulse: 84 73 81 78   Resp: 15 18 18 18   Temp: 98 °F (36.7 °C) 98 °F (36.7 °C)  98.1 °F (36.7 °C)   TempSrc: Oral      SpO2: 95% (!) 94% (!) 93% (!) 94%       Abnormal Lab Results:  Labs Reviewed - No data to display     All Lab Results:  None    Imaging Results:  Imaging Results    None                 The Emergency Provider reviewed the vital signs and test results, which are outlined above.     ED Discussion       5:25 AM: Reassessed pt at this time. Discussed with pt all pertinent ED information and results. Discussed pt dx and plan of tx. Gave pt all f/u and return to the ED instructions. All questions and concerns were addressed at this time. Pt expresses understanding of information and instructions, and is comfortable with plan to discharge. Pt is stable for discharge.    I discussed with patient and/or family/caretaker that evaluation in the ED does not suggest any emergent or life threatening medical conditions requiring immediate intervention beyond what was provided in the ED, and I believe patient is safe for discharge.  Regardless, an unremarkable evaluation in the ED does not preclude the development or presence of a serious of life threatening condition. As such, patient was instructed to return immediately for any worsening or change in current symptoms.                   ED Medication(s):  Medications - No data to display    Discharge Medication List as of 7/21/2022  5:25 AM           Follow-up Information     [facility] In 2 days.    Specialty: Urology  Contact information:  [address] [facility] [address] [phone]           [facility] - Emergency Dept..    Specialty:  Emergency Medicine  Why: As needed, If symptoms worsen  Contact information:  [address]  [facility] [address] [phone]                           Scribe Attestation:   Scribe #1: I performed the above scribed service and the documentation accurately describes the services I performed. I attest to the accuracy of the note.     Attending:   Physician Attestation Statement for Scribe #1: I, Karla Patel MD, personally performed the services described in this documentation, as scribed by Geo Balderrama, in my presence, and it is both accurate and complete.           Clinical Impression       ICD-10-CM ICD-9-CM   1. Urinary retention  R33.9 788.20       Disposition:   Disposition: Discharged  Condition: Stable         Karla Patel MD  07/22/22 1557

## 2022-07-28 ENCOUNTER — TELEPHONE (OUTPATIENT)
Dept: SURGERY | Facility: CLINIC | Age: 72
End: 2022-07-28
Payer: MEDICARE

## 2022-07-28 NOTE — TELEPHONE ENCOUNTER
Called pt's daughter to reschedule appointment with Dr. Sharp on 8/2 to Misa Dixon on 8/3 at the [facility], appt should have been scheduled originally for this date, pt's daughter verbalized understanding.

## 2022-07-29 ENCOUNTER — TELEPHONE (OUTPATIENT)
Dept: SURGERY | Facility: CLINIC | Age: 72
End: 2022-07-29
Payer: MEDICARE

## 2022-07-29 NOTE — TELEPHONE ENCOUNTER
Returned pt's callback, pt and pt's daughter Fidelia is on the line, they would like a refill on th Percocet, notified them that Dr. Sharp is unable to refill this medication, pt denies any fever, nausea, shortness of breath, just soreness, notified pt that after such a recent hernia surgery soreness is common and typically see about 6 weeks until full recovery, notified pt to restrict himself from active movements and recommend tylenol and a heat pad for the area, pt verbalized understanding.

## 2022-09-06 ENCOUNTER — PATIENT MESSAGE (OUTPATIENT)
Dept: ADMINISTRATIVE | Facility: OTHER | Age: 72
End: 2022-09-06
Payer: MEDICARE

## 2022-12-06 ENCOUNTER — PATIENT MESSAGE (OUTPATIENT)
Dept: RESEARCH | Facility: HOSPITAL | Age: 72
End: 2022-12-06
Payer: MEDICARE

## 2022-12-14 DIAGNOSIS — I73.9 PVD (PERIPHERAL VASCULAR DISEASE): Primary | ICD-10-CM

## 2023-04-19 ENCOUNTER — PATIENT MESSAGE (OUTPATIENT)
Dept: ADMINISTRATIVE | Facility: HOSPITAL | Age: 73
End: 2023-04-19
Payer: MEDICARE

## 2023-04-20 ENCOUNTER — OFFICE VISIT (OUTPATIENT)
Dept: INTERNAL MEDICINE | Facility: CLINIC | Age: 73
DRG: 641 | End: 2023-04-20
Payer: MEDICARE

## 2023-04-20 VITALS
OXYGEN SATURATION: 95 % | HEART RATE: 98 BPM | WEIGHT: 163.56 LBS | DIASTOLIC BLOOD PRESSURE: 74 MMHG | RESPIRATION RATE: 16 BRPM | BODY MASS INDEX: 20.99 KG/M2 | SYSTOLIC BLOOD PRESSURE: 136 MMHG | TEMPERATURE: 98 F | HEIGHT: 74 IN

## 2023-04-20 DIAGNOSIS — R53.1 WEAKNESS: ICD-10-CM

## 2023-04-20 DIAGNOSIS — R63.4 WEIGHT LOSS, ABNORMAL: Primary | ICD-10-CM

## 2023-04-20 DIAGNOSIS — I10 PRIMARY HYPERTENSION: ICD-10-CM

## 2023-04-20 DIAGNOSIS — E03.9 HYPOTHYROIDISM, UNSPECIFIED TYPE: ICD-10-CM

## 2023-04-20 PROCEDURE — 99999 PR PBB SHADOW E&M-EST. PATIENT-LVL IV: ICD-10-PCS | Mod: PBBFAC,,, | Performed by: FAMILY MEDICINE

## 2023-04-20 PROCEDURE — 3008F PR BODY MASS INDEX (BMI) DOCUMENTED: ICD-10-PCS | Mod: CPTII,S$GLB,, | Performed by: FAMILY MEDICINE

## 2023-04-20 PROCEDURE — 3075F PR MOST RECENT SYSTOLIC BLOOD PRESS GE 130-139MM HG: ICD-10-PCS | Mod: CPTII,S$GLB,, | Performed by: FAMILY MEDICINE

## 2023-04-20 PROCEDURE — 99214 OFFICE O/P EST MOD 30 MIN: CPT | Mod: S$GLB,,, | Performed by: FAMILY MEDICINE

## 2023-04-20 PROCEDURE — 1100F PR PT FALLS ASSESS DOC 2+ FALLS/FALL W/INJURY/YR: ICD-10-PCS | Mod: CPTII,S$GLB,, | Performed by: FAMILY MEDICINE

## 2023-04-20 PROCEDURE — 3075F SYST BP GE 130 - 139MM HG: CPT | Mod: CPTII,S$GLB,, | Performed by: FAMILY MEDICINE

## 2023-04-20 PROCEDURE — 99499 UNLISTED E&M SERVICE: CPT | Mod: S$GLB,,, | Performed by: FAMILY MEDICINE

## 2023-04-20 PROCEDURE — 3078F PR MOST RECENT DIASTOLIC BLOOD PRESSURE < 80 MM HG: ICD-10-PCS | Mod: CPTII,S$GLB,, | Performed by: FAMILY MEDICINE

## 2023-04-20 PROCEDURE — 3288F FALL RISK ASSESSMENT DOCD: CPT | Mod: CPTII,S$GLB,, | Performed by: FAMILY MEDICINE

## 2023-04-20 PROCEDURE — 1126F AMNT PAIN NOTED NONE PRSNT: CPT | Mod: CPTII,S$GLB,, | Performed by: FAMILY MEDICINE

## 2023-04-20 PROCEDURE — 3078F DIAST BP <80 MM HG: CPT | Mod: CPTII,S$GLB,, | Performed by: FAMILY MEDICINE

## 2023-04-20 PROCEDURE — 3008F BODY MASS INDEX DOCD: CPT | Mod: CPTII,S$GLB,, | Performed by: FAMILY MEDICINE

## 2023-04-20 PROCEDURE — 99999 PR PBB SHADOW E&M-EST. PATIENT-LVL IV: CPT | Mod: PBBFAC,,, | Performed by: FAMILY MEDICINE

## 2023-04-20 PROCEDURE — 3288F PR FALLS RISK ASSESSMENT DOCUMENTED: ICD-10-PCS | Mod: CPTII,S$GLB,, | Performed by: FAMILY MEDICINE

## 2023-04-20 PROCEDURE — 1126F PR PAIN SEVERITY QUANTIFIED, NO PAIN PRESENT: ICD-10-PCS | Mod: CPTII,S$GLB,, | Performed by: FAMILY MEDICINE

## 2023-04-20 PROCEDURE — 99214 PR OFFICE/OUTPT VISIT, EST, LEVL IV, 30-39 MIN: ICD-10-PCS | Mod: S$GLB,,, | Performed by: FAMILY MEDICINE

## 2023-04-20 PROCEDURE — 1100F PTFALLS ASSESS-DOCD GE2>/YR: CPT | Mod: CPTII,S$GLB,, | Performed by: FAMILY MEDICINE

## 2023-04-20 PROCEDURE — 99499 RISK ADDL DX/OHS AUDIT: ICD-10-PCS | Mod: S$GLB,,, | Performed by: FAMILY MEDICINE

## 2023-04-20 NOTE — PROGRESS NOTES
Subjective:       Patient ID: Zohaib Wilson Sr. is a 72 y.o. male.    Chief Complaint: Weight Loss    Patient presents to clinic today for weight loss. His daughter, Fidelia Lutz, and KORIN are present today. Daughter reports her brother was caring for patient, but does not live with him. She went to see him last week and noticed he had lost a lot of weight and is weak. She states there was hardly any food in the house, she reports he had some milk. She states at this time her cousin is at patient's home cooking for him. She is going to grocery shop for him today and reports she is back in the area to assist with his care. She reports having some medical issues and other family members to care for recently. Patient is without complaints today, but is anxious to go home.     Wt Readings from Last 10 Encounters:  04/20/23 : 74.2 kg (163 lb 9.3 oz)  07/20/22 : 99.5 kg (219 lb 4 oz)  07/05/22 : 98 kg (216 lb 0.8 oz)  06/22/22 : 97.9 kg (215 lb 13.3 oz)  06/10/22 : 98.3 kg (216 lb 11.4 oz)  02/04/22 : 102.3 kg (225 lb 6.7 oz)  01/13/22 : 101.8 kg (224 lb 6.9 oz)  01/07/22 : 101.8 kg (224 lb 6.9 oz)  01/06/22 : 101.8 kg (224 lb 6.9 oz)  01/04/22 : 89 kg (196 lb 3.4 oz)        Review of Systems   Constitutional:  Positive for fatigue and unexpected weight change. Negative for chills and fever.   Eyes:  Negative for visual disturbance.   Respiratory:  Negative for shortness of breath.    Cardiovascular:  Negative for chest pain.   Musculoskeletal:  Negative for myalgias.   Neurological:  Positive for weakness. Negative for headaches.       Objective:      Physical Exam  Vitals reviewed.   Constitutional:       General: He is not in acute distress.     Appearance: He is well-developed.      Comments: disheveled   HENT:      Head: Normocephalic and atraumatic.   Eyes:      General: Lids are normal. No scleral icterus.     Extraocular Movements: Extraocular movements intact.      Conjunctiva/sclera: Conjunctivae normal.       Pupils: Pupils are equal, round, and reactive to light.   Cardiovascular:      Rate and Rhythm: Normal rate and regular rhythm.      Heart sounds: No murmur heard.    No friction rub. No gallop.   Pulmonary:      Effort: Pulmonary effort is normal.      Breath sounds: Normal breath sounds. No decreased breath sounds, wheezing, rhonchi or rales.   Neurological:      Mental Status: He is alert and oriented to person, place, and time.   Psychiatric:         Mood and Affect: Mood and affect normal.       Assessment:       1. Weight loss, abnormal    2. Weakness    3. Primary hypertension    4. Hypothyroidism, unspecified type        Plan:   1. Weight loss, abnormal  -     Comprehensive Metabolic Panel; Future; Expected date: 04/20/2023  -     CBC Auto Differential; Future; Expected date: 04/20/2023  -     Ambulatory referral/consult to Home Health; Future; Expected date: 04/21/2023    2. Weakness  -     Ambulatory referral/consult to Home Health; Future; Expected date: 04/21/2023    3. Primary hypertension  Assessment & Plan:  Controlled; daughter will review medications in the home so we can confirm what patient is taking      4. Hypothyroidism, unspecified type  -     TSH; Future; Expected date: 04/20/2023  -     T4, Free; Future; Expected date: 04/20/2023      Suspect that patient is malnourished due to neglect. Daughter reports now that she is aware she will oversee his care/welfare.   Will obtain labs today and follow up in 1 month for reassessment of weight.  Home health ordered to evaluate home environment and assist with long range planning. Physical therapy to assist with weakness/debility.  Daughter, KORIN and patient express understanding and agreement with plan.

## 2023-04-21 ENCOUNTER — HOSPITAL ENCOUNTER (INPATIENT)
Facility: HOSPITAL | Age: 73
LOS: 1 days | Discharge: HOME HEALTH | DRG: 641 | End: 2023-04-22
Attending: EMERGENCY MEDICINE | Admitting: INTERNAL MEDICINE
Payer: MEDICARE

## 2023-04-21 DIAGNOSIS — N17.9 ACUTE KIDNEY INJURY SUPERIMPOSED ON CHRONIC KIDNEY DISEASE: Primary | ICD-10-CM

## 2023-04-21 DIAGNOSIS — R41.0 CONFUSION: ICD-10-CM

## 2023-04-21 DIAGNOSIS — R53.1 WEAKNESS: ICD-10-CM

## 2023-04-21 DIAGNOSIS — N18.9 ACUTE KIDNEY INJURY SUPERIMPOSED ON CHRONIC KIDNEY DISEASE: Primary | ICD-10-CM

## 2023-04-21 DIAGNOSIS — I50.9 CHF (CONGESTIVE HEART FAILURE): ICD-10-CM

## 2023-04-21 DIAGNOSIS — E83.52 HYPERCALCEMIA: ICD-10-CM

## 2023-04-21 DIAGNOSIS — R63.4 WEIGHT LOSS, ABNORMAL: ICD-10-CM

## 2023-04-21 PROBLEM — I50.42 CHRONIC COMBINED SYSTOLIC AND DIASTOLIC CONGESTIVE HEART FAILURE: Status: ACTIVE | Noted: 2021-11-04

## 2023-04-21 PROBLEM — F03.90 DEMENTIA: Status: ACTIVE | Noted: 2023-04-21

## 2023-04-21 PROBLEM — N18.4 ACUTE RENAL FAILURE SUPERIMPOSED ON STAGE 4 CHRONIC KIDNEY DISEASE: Status: ACTIVE | Noted: 2021-10-17

## 2023-04-21 LAB
ALBUMIN SERPL BCP-MCNC: 3.5 G/DL (ref 3.5–5.2)
ALP SERPL-CCNC: 111 U/L (ref 55–135)
ALT SERPL W/O P-5'-P-CCNC: 33 U/L (ref 10–44)
AMMONIA PLAS-SCNC: 22 UMOL/L (ref 10–50)
ANION GAP SERPL CALC-SCNC: 14 MMOL/L (ref 8–16)
AORTIC ROOT ANNULUS: 3.4 CM
ASCENDING AORTA: 2.54 CM
AST SERPL-CCNC: 19 U/L (ref 10–40)
AV INDEX (PROSTH): 0.68
AV MEAN GRADIENT: 4 MMHG
AV PEAK GRADIENT: 10 MMHG
AV VALVE AREA: 3.08 CM2
AV VELOCITY RATIO: 0.54
BACTERIA #/AREA URNS HPF: ABNORMAL /HPF
BILIRUB SERPL-MCNC: 0.3 MG/DL (ref 0.1–1)
BILIRUB UR QL STRIP: NEGATIVE
BSA FOR ECHO PROCEDURE: 2.12 M2
BUN SERPL-MCNC: 48 MG/DL (ref 8–23)
CA-I BLDV-SCNC: 1.62 MMOL/L (ref 1.06–1.42)
CALCIUM SERPL-MCNC: 14 MG/DL (ref 8.7–10.5)
CHLORIDE SERPL-SCNC: 101 MMOL/L (ref 95–110)
CLARITY UR: CLEAR
CO2 SERPL-SCNC: 27 MMOL/L (ref 23–29)
COLOR UR: YELLOW
CREAT SERPL-MCNC: 3.1 MG/DL (ref 0.5–1.4)
CV ECHO LV RWT: 0.84 CM
DOP CALC AO PEAK VEL: 1.56 M/S
DOP CALC AO VTI: 24.4 CM
DOP CALC LVOT AREA: 4.6 CM2
DOP CALC LVOT DIAMETER: 2.41 CM
DOP CALC LVOT PEAK VEL: 0.85 M/S
DOP CALC LVOT STROKE VOLUME: 75.23 CM3
DOP CALC RVOT PEAK VEL: 0.71 M/S
DOP CALC RVOT VTI: 11.2 CM
DOP CALCLVOT PEAK VEL VTI: 16.5 CM
E WAVE DECELERATION TIME: 262 MSEC
E/A RATIO: 0.53
E/E' RATIO: 8.62 M/S
ECHO LV POSTERIOR WALL: 1.72 CM (ref 0.6–1.1)
EJECTION FRACTION: 55 %
EST. GFR  (NO RACE VARIABLE): 21 ML/MIN/1.73 M^2
FRACTIONAL SHORTENING: 19 % (ref 28–44)
GLUCOSE SERPL-MCNC: 85 MG/DL (ref 70–110)
GLUCOSE UR QL STRIP: NEGATIVE
HGB UR QL STRIP: NEGATIVE
HYALINE CASTS #/AREA URNS LPF: 0 /LPF
INTERVENTRICULAR SEPTUM: 1.39 CM (ref 0.6–1.1)
IVC DIAMETER: 1.42 CM
IVRT: 85.63 MSEC
KETONES UR QL STRIP: NEGATIVE
LA MAJOR: 4.45 CM
LA MINOR: 4.72 CM
LA WIDTH: 2.2 CM
LEFT ATRIUM SIZE: 3.43 CM
LEFT ATRIUM VOLUME INDEX: 13.9 ML/M2
LEFT ATRIUM VOLUME: 29.38 CM3
LEFT INTERNAL DIMENSION IN SYSTOLE: 3.32 CM (ref 2.1–4)
LEFT VENTRICLE DIASTOLIC VOLUME INDEX: 34.84 ML/M2
LEFT VENTRICLE DIASTOLIC VOLUME: 73.87 ML
LEFT VENTRICLE MASS INDEX: 120 G/M2
LEFT VENTRICLE SYSTOLIC VOLUME INDEX: 21.1 ML/M2
LEFT VENTRICLE SYSTOLIC VOLUME: 44.78 ML
LEFT VENTRICULAR INTERNAL DIMENSION IN DIASTOLE: 4.09 CM (ref 3.5–6)
LEFT VENTRICULAR MASS: 254.22 G
LEUKOCYTE ESTERASE UR QL STRIP: ABNORMAL
LV LATERAL E/E' RATIO: 8 M/S
LV SEPTAL E/E' RATIO: 9.33 M/S
LVOT MG: 1.58 MMHG
LVOT MV: 0.61 CM/S
MAGNESIUM SERPL-MCNC: 2.4 MG/DL (ref 1.6–2.6)
MICROSCOPIC COMMENT: ABNORMAL
MV PEAK A VEL: 1.06 M/S
MV PEAK E VEL: 0.56 M/S
MV STENOSIS PRESSURE HALF TIME: 75.98 MS
MV VALVE AREA P 1/2 METHOD: 2.9 CM2
NITRITE UR QL STRIP: NEGATIVE
PH UR STRIP: 7 [PH] (ref 5–8)
PHOSPHATE SERPL-MCNC: 3.7 MG/DL (ref 2.7–4.5)
POTASSIUM SERPL-SCNC: 4.1 MMOL/L (ref 3.5–5.1)
PROCALCITONIN SERPL IA-MCNC: 0.12 NG/ML
PROT SERPL-MCNC: 7.9 G/DL (ref 6–8.4)
PROT UR QL STRIP: ABNORMAL
PROT UR-MCNC: 32 MG/DL (ref 0–15)
PTH-INTACT SERPL-MCNC: 40.7 PG/ML (ref 9–77)
PV MEAN GRADIENT: 1.09 MMHG
PV MV: 0.8 M/S
PV PEAK VELOCITY: 1.07 CM/S
RA MAJOR: 4.05 CM
RA PRESSURE: 8 MMHG
RBC #/AREA URNS HPF: 0 /HPF (ref 0–4)
RIGHT VENTRICULAR END-DIASTOLIC DIMENSION: 2.64 CM
RV TISSUE DOPPLER FREE WALL SYSTOLIC VELOCITY 1 (APICAL 4 CHAMBER VIEW): 0.02 CM/S
SODIUM SERPL-SCNC: 142 MMOL/L (ref 136–145)
SP GR UR STRIP: 1.01 (ref 1–1.03)
STJ: 2.67 CM
TDI LATERAL: 0.07 M/S
TDI SEPTAL: 0.06 M/S
TDI: 0.07 M/S
TRICUSPID ANNULAR PLANE SYSTOLIC EXCURSION: 2.5 CM
URN SPEC COLLECT METH UR: ABNORMAL
UROBILINOGEN UR STRIP-ACNC: NEGATIVE EU/DL
WBC #/AREA URNS HPF: 27 /HPF (ref 0–5)
WBC CLUMPS URNS QL MICRO: ABNORMAL
YEAST URNS QL MICRO: ABNORMAL

## 2023-04-21 PROCEDURE — 82330 ASSAY OF CALCIUM: CPT | Performed by: INTERNAL MEDICINE

## 2023-04-21 PROCEDURE — 25000003 PHARM REV CODE 250: Performed by: INTERNAL MEDICINE

## 2023-04-21 PROCEDURE — 96361 HYDRATE IV INFUSION ADD-ON: CPT

## 2023-04-21 PROCEDURE — 93010 EKG 12-LEAD: ICD-10-PCS | Mod: ,,, | Performed by: INTERNAL MEDICINE

## 2023-04-21 PROCEDURE — 93005 ELECTROCARDIOGRAM TRACING: CPT

## 2023-04-21 PROCEDURE — 99285 EMERGENCY DEPT VISIT HI MDM: CPT | Mod: 25

## 2023-04-21 PROCEDURE — 63600175 PHARM REV CODE 636 W HCPCS: Performed by: INTERNAL MEDICINE

## 2023-04-21 PROCEDURE — 83735 ASSAY OF MAGNESIUM: CPT | Performed by: EMERGENCY MEDICINE

## 2023-04-21 PROCEDURE — 93010 ELECTROCARDIOGRAM REPORT: CPT | Mod: ,,, | Performed by: INTERNAL MEDICINE

## 2023-04-21 PROCEDURE — 96372 THER/PROPH/DIAG INJ SC/IM: CPT | Performed by: INTERNAL MEDICINE

## 2023-04-21 PROCEDURE — 80053 COMPREHEN METABOLIC PANEL: CPT | Performed by: EMERGENCY MEDICINE

## 2023-04-21 PROCEDURE — 86335 IMMUNFIX E-PHORSIS/URINE/CSF: CPT | Performed by: INTERNAL MEDICINE

## 2023-04-21 PROCEDURE — 99223 PR INITIAL HOSPITAL CARE,LEVL III: ICD-10-PCS | Mod: ,,, | Performed by: INTERNAL MEDICINE

## 2023-04-21 PROCEDURE — 87086 URINE CULTURE/COLONY COUNT: CPT | Performed by: INTERNAL MEDICINE

## 2023-04-21 PROCEDURE — 99223 1ST HOSP IP/OBS HIGH 75: CPT | Mod: ,,, | Performed by: INTERNAL MEDICINE

## 2023-04-21 PROCEDURE — 96366 THER/PROPH/DIAG IV INF ADDON: CPT

## 2023-04-21 PROCEDURE — 82140 ASSAY OF AMMONIA: CPT | Performed by: INTERNAL MEDICINE

## 2023-04-21 PROCEDURE — 84156 ASSAY OF PROTEIN URINE: CPT | Performed by: INTERNAL MEDICINE

## 2023-04-21 PROCEDURE — 86335 IMMUNFIX E-PHORSIS/URINE/CSF: CPT | Mod: 26,,, | Performed by: PATHOLOGY

## 2023-04-21 PROCEDURE — 82397 CHEMILUMINESCENT ASSAY: CPT | Performed by: INTERNAL MEDICINE

## 2023-04-21 PROCEDURE — 83883 ASSAY NEPHELOMETRY NOT SPEC: CPT | Performed by: INTERNAL MEDICINE

## 2023-04-21 PROCEDURE — 84100 ASSAY OF PHOSPHORUS: CPT | Performed by: EMERGENCY MEDICINE

## 2023-04-21 PROCEDURE — 25000003 PHARM REV CODE 250: Performed by: EMERGENCY MEDICINE

## 2023-04-21 PROCEDURE — 96365 THER/PROPH/DIAG IV INF INIT: CPT

## 2023-04-21 PROCEDURE — 86335 PATHOLOGIST INTERPRETATION UIFE: ICD-10-PCS | Mod: 26,,, | Performed by: PATHOLOGY

## 2023-04-21 PROCEDURE — 83970 ASSAY OF PARATHORMONE: CPT | Performed by: INTERNAL MEDICINE

## 2023-04-21 PROCEDURE — 81000 URINALYSIS NONAUTO W/SCOPE: CPT | Performed by: INTERNAL MEDICINE

## 2023-04-21 PROCEDURE — 63600175 PHARM REV CODE 636 W HCPCS: Mod: JZ,JG | Performed by: INTERNAL MEDICINE

## 2023-04-21 PROCEDURE — 11000001 HC ACUTE MED/SURG PRIVATE ROOM

## 2023-04-21 PROCEDURE — 25000003 PHARM REV CODE 250: Performed by: NURSE PRACTITIONER

## 2023-04-21 PROCEDURE — 84145 PROCALCITONIN (PCT): CPT | Performed by: EMERGENCY MEDICINE

## 2023-04-21 RX ORDER — ACETAMINOPHEN 325 MG/1
650 TABLET ORAL EVERY 6 HOURS PRN
Status: DISCONTINUED | OUTPATIENT
Start: 2023-04-21 | End: 2023-04-22 | Stop reason: HOSPADM

## 2023-04-21 RX ORDER — CALCITONIN SALMON 200 [USP'U]/ML
4 INJECTION, SOLUTION INTRAMUSCULAR; SUBCUTANEOUS EVERY 12 HOURS
Status: DISCONTINUED | OUTPATIENT
Start: 2023-04-21 | End: 2023-04-22

## 2023-04-21 RX ORDER — AMOXICILLIN 250 MG
1 CAPSULE ORAL 2 TIMES DAILY
Status: DISCONTINUED | OUTPATIENT
Start: 2023-04-21 | End: 2023-04-22 | Stop reason: HOSPADM

## 2023-04-21 RX ORDER — ONDANSETRON 2 MG/ML
4 INJECTION INTRAMUSCULAR; INTRAVENOUS EVERY 8 HOURS PRN
Status: DISCONTINUED | OUTPATIENT
Start: 2023-04-21 | End: 2023-04-22 | Stop reason: HOSPADM

## 2023-04-21 RX ORDER — SODIUM CHLORIDE 9 MG/ML
INJECTION, SOLUTION INTRAVENOUS CONTINUOUS
Status: ACTIVE | OUTPATIENT
Start: 2023-04-21 | End: 2023-04-22

## 2023-04-21 RX ORDER — IPRATROPIUM BROMIDE AND ALBUTEROL SULFATE 2.5; .5 MG/3ML; MG/3ML
3 SOLUTION RESPIRATORY (INHALATION) EVERY 4 HOURS PRN
Status: DISCONTINUED | OUTPATIENT
Start: 2023-04-21 | End: 2023-04-22 | Stop reason: HOSPADM

## 2023-04-21 RX ORDER — SODIUM CHLORIDE 9 MG/ML
1000 INJECTION, SOLUTION INTRAVENOUS
Status: DISCONTINUED | OUTPATIENT
Start: 2023-04-21 | End: 2023-04-21

## 2023-04-21 RX ORDER — BACLOFEN 5 MG/1
5 TABLET ORAL ONCE
Status: COMPLETED | OUTPATIENT
Start: 2023-04-21 | End: 2023-04-21

## 2023-04-21 RX ORDER — GUAIFENESIN 100 MG/5ML
200 SOLUTION ORAL EVERY 4 HOURS PRN
Status: DISCONTINUED | OUTPATIENT
Start: 2023-04-21 | End: 2023-04-22 | Stop reason: HOSPADM

## 2023-04-21 RX ORDER — SODIUM CHLORIDE 9 MG/ML
1000 INJECTION, SOLUTION INTRAVENOUS
Status: COMPLETED | OUTPATIENT
Start: 2023-04-21 | End: 2023-04-21

## 2023-04-21 RX ADMIN — CALCITONIN SALMON 342 UNITS: 200 INJECTION, SOLUTION INTRAMUSCULAR; SUBCUTANEOUS at 06:04

## 2023-04-21 RX ADMIN — SODIUM CHLORIDE 1000 ML: 9 INJECTION, SOLUTION INTRAVENOUS at 03:04

## 2023-04-21 RX ADMIN — SODIUM CHLORIDE: 9 INJECTION, SOLUTION INTRAVENOUS at 06:04

## 2023-04-21 RX ADMIN — CALCITONIN SALMON 342 UNITS: 200 INJECTION, SOLUTION INTRAMUSCULAR; SUBCUTANEOUS at 09:04

## 2023-04-21 RX ADMIN — PAMIDRONATE DISODIUM 90 MG: 9 INJECTION INTRAVENOUS at 06:04

## 2023-04-21 RX ADMIN — BACLOFEN 5 MG: 5 TABLET ORAL at 09:04

## 2023-04-21 RX ADMIN — SODIUM CHLORIDE: 9 INJECTION, SOLUTION INTRAVENOUS at 01:04

## 2023-04-21 NOTE — ED NOTES
Bed: 07  Expected date:   Expected time:   Means of arrival:   Comments:  Bed 18   Current every day smoker

## 2023-04-21 NOTE — SUBJECTIVE & OBJECTIVE
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    Tobacco user        Past Surgical History:   Procedure Laterality Date    CATARACT EXTRACTION      LASER OF PROSTATE W/ GREEN LIGHT PVP      ROBOT-ASSISTED REPAIR OF UMBILICAL HERNIA USING DA MARC XI N/A 7/20/2022    Procedure: XI ROBOTIC REPAIR, HERNIA, UMBILICAL;  Surgeon: Justine Sharp DO;  Location: [facility];  Service: General;  Laterality: N/A;    TRANSURETHRAL RESECTION OF PROSTATE         Review of patient's allergies indicates:   Allergen Reactions    Naproxen sodium Hives     Other reaction(s): hives    Iodine      Other reaction(s): hives       No current facility-administered medications on file prior to encounter.     Current Outpatient Medications on File Prior to Encounter   Medication Sig    albuterol (ACCUNEB) 1.25 mg/3 mL Nebu Take 3 mLs (1.25 mg total) by nebulization 2 (two) times a day. Rescue    aspirin (ECOTRIN) 81 MG EC tablet Take 1 tablet (81 mg total) by mouth once daily.    atorvastatin (LIPITOR) 40 MG tablet Take 1 tablet (40 mg total) by mouth once daily.    docusate sodium (COLACE) 100 MG capsule Take 1 capsule (100 mg total) by mouth 2 (two) times daily.    levothyroxine (SYNTHROID) 25 MCG tablet Take 25 mcg by mouth before breakfast.    metoprolol succinate (TOPROL-XL) 25 MG 24 hr tablet TAKE 1 TABLET(25 MG) BY MOUTH EVERY DAY    potassium chloride (K-TAB) 20 mEq Take 20 mEq by mouth.    prednisoLONE acetate (PRED FORTE) 1 % DrpS Place 1 drop into the left eye 4 (four) times daily.    sacubitriL-valsartan (ENTRESTO) 49-51 mg per tablet Take 1 tablet by mouth 2 (two) times daily.    SITagliptin (JANUVIA) 25 MG Tab Take 25 mg by mouth.    tamsulosin (FLOMAX) 0.4 mg Cap Take 0.4 mg by mouth.    TRELEGY ELLIPTA 100-62.5-25 mcg DsDv Inhale 1 puff into the lungs  once daily.     Family History    Unable to obtain       Tobacco Use    Smoking status: Every Day     Packs/day: 1.50     Years: 60.00     Pack years: 90.00     Types: Cigarettes     Start date: 1963    Smokeless tobacco: Never   Substance and Sexual Activity    Alcohol use: Not Currently    Drug use: Not Currently    Sexual activity: Not Currently     Review of Systems   Unable to perform ROS: Dementia   Objective:     Vital Signs (Most Recent):  Temp: 98.7 °F (37.1 °C) (04/21/23 0141)  Pulse: 82 (04/21/23 0330)  Resp: 18 (04/21/23 0330)  BP: 104/83 (04/21/23 0330)  SpO2: 95 % (04/21/23 0330) Vital Signs (24h Range):  Temp:  [97.7 °F (36.5 °C)-98.7 °F (37.1 °C)] 98.7 °F (37.1 °C)  Pulse:  [81-98] 82  Resp:  [16-18] 18  SpO2:  [95 %-97 %] 95 %  BP: (104-144)/(68-83) 104/83     Weight: 85.7 kg (188 lb 15 oz)  Body mass index is 24.26 kg/m².    Physical Exam  Vitals and nursing note reviewed.   Constitutional:       Appearance: He is ill-appearing.   HENT:      Head: Normocephalic and atraumatic.      Mouth/Throat:      Mouth: Mucous membranes are dry.   Eyes:      Conjunctiva/sclera: Conjunctivae normal.      Pupils: Pupils are equal, round, and reactive to light.   Cardiovascular:      Rate and Rhythm: Normal rate and regular rhythm.   Pulmonary:      Effort: Pulmonary effort is normal. No respiratory distress.   Abdominal:      General: There is no distension.      Tenderness: There is no abdominal tenderness.   Musculoskeletal:         General: No swelling.      Cervical back: No rigidity.   Skin:     Coloration: Skin is not jaundiced.   Neurological:      Mental Status: He is alert. He is disoriented.      Comments: Patient not alert to place, person and time.  Says he wants to go home.  No family members at the bedside.         CRANIAL NERVES     CN III, IV, VI   Pupils are equal, round, and reactive to light.     Significant Labs: All pertinent labs within the past 24 hours have been reviewed.  BMP:   Recent  Labs   Lab 04/21/23  0251   GLU 85      K 4.1      CO2 27   BUN 48*   CREATININE 3.1*   CALCIUM 14.0*   MG 2.4     CBC:   Recent Labs   Lab 04/20/23  1507   WBC 7.28   HGB 14.1   HCT 43.8        CMP:   Recent Labs   Lab 04/20/23  1507 04/21/23  0251    142   K 4.5 4.1    101   CO2 27 27   GLU 86 85   BUN 42* 48*   CREATININE 3.0* 3.1*   CALCIUM 15.2* 14.0*   PROT 7.5 7.9   ALBUMIN 3.8 3.5   BILITOT 0.4 0.3   ALKPHOS 118 111   AST 21 19   ALT 35 33   ANIONGAP 10 14     Cardiac Markers: No results for input(s): CKMB, MYOGLOBIN, BNP, TROPISTAT in the last 48 hours.  Troponin: No results for input(s): TROPONINI, TROPONINIHS in the last 48 hours.  Urine Studies: No results for input(s): COLORU, APPEARANCEUA, PHUR, SPECGRAV, PROTEINUA, GLUCUA, KETONESU, BILIRUBINUA, OCCULTUA, NITRITE, UROBILINOGEN, LEUKOCYTESUR, RBCUA, WBCUA, BACTERIA, SQUAMEPITHEL, HYALINECASTS in the last 48 hours.    Invalid input(s): LASHAWN    Significant Imaging: I have reviewed all pertinent imaging results/findings within the past 24 hours.  I have reviewed and interpreted all pertinent imaging results/findings within the past 24 hours.

## 2023-04-21 NOTE — H&P
Formerly [facility] Emergency Dept.  [facility]  History & Physical    Patient Name: Zohaib Wilson Sr.  MRN: [id number]  Patient Class: OP- Observation  Admission Date: 4/21/2023  Attending Physician: Rex Roberson MD   Primary Care Provider: Nikki Alfredo MD         Patient information was obtained from patient, past medical records and ER records.     Subjective:     Principal Problem:Hypercalcemia    Chief Complaint:   Chief Complaint   Patient presents with    abnormal labs     Pt seen by PCP today with labs. Called for elevated Ca of 15.2 and advised come to ED. Pt hx dementia and disoriented to time, person, place, and events. Pt transported to ED via [facility] (no IV, meds, or EKG in field).        HPI: Mr. Wilson is a 72-year-old  male, with PMH significant for dementia, hypothyroidism, was sent to the ED from PCP clinic due to high calcium of 15.2.  Worsening renal function, creatinine 3.0 (baseline around 2.2).  Due to dementia, patient is a very poor historian.  States that he wants to go home, but currently not oriented to place or person or time.  Repeat blood work shows calcium 14.0.  Patient known history of CHF, EF 40% with grade 2 diastolic dysfunction.  Hence did not receive aggressive IV fluid boluses in the ED. received NS at 75 cc/hour.  CXR, UA, imaging studies pending at this time.    Admitting diagnosis:  Hypercalcemia.      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    Tobacco user        Past Surgical History:   Procedure Laterality Date    CATARACT EXTRACTION      LASER OF PROSTATE W/ GREEN LIGHT PVP      ROBOT-ASSISTED REPAIR OF UMBILICAL HERNIA USING DA MARC XI N/A 7/20/2022    Procedure: XI ROBOTIC REPAIR, HERNIA, UMBILICAL;  Surgeon: Justine Sharp DO;  Location: [facility] OR;  Service:  General;  Laterality: N/A;    TRANSURETHRAL RESECTION OF PROSTATE         Review of patient's allergies indicates:   Allergen Reactions    Naproxen sodium Hives     Other reaction(s): hives    Iodine      Other reaction(s): hives       No current facility-administered medications on file prior to encounter.     Current Outpatient Medications on File Prior to Encounter   Medication Sig    albuterol (ACCUNEB) 1.25 mg/3 mL Nebu Take 3 mLs (1.25 mg total) by nebulization 2 (two) times a day. Rescue    aspirin (ECOTRIN) 81 MG EC tablet Take 1 tablet (81 mg total) by mouth once daily.    atorvastatin (LIPITOR) 40 MG tablet Take 1 tablet (40 mg total) by mouth once daily.    docusate sodium (COLACE) 100 MG capsule Take 1 capsule (100 mg total) by mouth 2 (two) times daily.    levothyroxine (SYNTHROID) 25 MCG tablet Take 25 mcg by mouth before breakfast.    metoprolol succinate (TOPROL-XL) 25 MG 24 hr tablet TAKE 1 TABLET(25 MG) BY MOUTH EVERY DAY    potassium chloride (K-TAB) 20 mEq Take 20 mEq by mouth.    prednisoLONE acetate (PRED FORTE) 1 % DrpS Place 1 drop into the left eye 4 (four) times daily.    sacubitriL-valsartan (ENTRESTO) 49-51 mg per tablet Take 1 tablet by mouth 2 (two) times daily.    SITagliptin (JANUVIA) 25 MG Tab Take 25 mg by mouth.    tamsulosin (FLOMAX) 0.4 mg Cap Take 0.4 mg by mouth.    TRELEGY ELLIPTA 100-62.5-25 mcg DsDv Inhale 1 puff into the lungs once daily.     Family History    Unable to obtain       Tobacco Use    Smoking status: Every Day     Packs/day: 1.50     Years: 60.00     Pack years: 90.00     Types: Cigarettes     Start date: 1963    Smokeless tobacco: Never   Substance and Sexual Activity    Alcohol use: Not Currently    Drug use: Not Currently    Sexual activity: Not Currently     Review of Systems   Unable to perform ROS: Dementia   Objective:     Vital Signs (Most Recent):  Temp: 98.7 °F (37.1 °C) (04/21/23 0141)  Pulse: 82 (04/21/23 0330)  Resp: 18  (04/21/23 0330)  BP: 104/83 (04/21/23 0330)  SpO2: 95 % (04/21/23 0330) Vital Signs (24h Range):  Temp:  [97.7 °F (36.5 °C)-98.7 °F (37.1 °C)] 98.7 °F (37.1 °C)  Pulse:  [81-98] 82  Resp:  [16-18] 18  SpO2:  [95 %-97 %] 95 %  BP: (104-144)/(68-83) 104/83     Weight: 85.7 kg (188 lb 15 oz)  Body mass index is 24.26 kg/m².    Physical Exam  Vitals and nursing note reviewed.   Constitutional:       Appearance: He is ill-appearing.   HENT:      Head: Normocephalic and atraumatic.      Mouth/Throat:      Mouth: Mucous membranes are dry.   Eyes:      Conjunctiva/sclera: Conjunctivae normal.      Pupils: Pupils are equal, round, and reactive to light.   Cardiovascular:      Rate and Rhythm: Normal rate and regular rhythm.   Pulmonary:      Effort: Pulmonary effort is normal. No respiratory distress.   Abdominal:      General: There is no distension.      Tenderness: There is no abdominal tenderness.   Musculoskeletal:         General: No swelling.      Cervical back: No rigidity.   Skin:     Coloration: Skin is not jaundiced.   Neurological:      Mental Status: He is alert. He is disoriented.      Comments: Patient not alert to place, person and time.  Says he wants to go home.  No family members at the bedside.         CRANIAL NERVES     CN III, IV, VI   Pupils are equal, round, and reactive to light.     Significant Labs: All pertinent labs within the past 24 hours have been reviewed.  BMP:   Recent Labs   Lab 04/21/23  0251   GLU 85      K 4.1      CO2 27   BUN 48*   CREATININE 3.1*   CALCIUM 14.0*   MG 2.4     CBC:   Recent Labs   Lab 04/20/23  1507   WBC 7.28   HGB 14.1   HCT 43.8        CMP:   Recent Labs   Lab 04/20/23  1507 04/21/23  0251    142   K 4.5 4.1    101   CO2 27 27   GLU 86 85   BUN 42* 48*   CREATININE 3.0* 3.1*   CALCIUM 15.2* 14.0*   PROT 7.5 7.9   ALBUMIN 3.8 3.5   BILITOT 0.4 0.3   ALKPHOS 118 111   AST 21 19   ALT 35 33   ANIONGAP 10 14     Cardiac Markers: No results  for input(s): CKMB, MYOGLOBIN, BNP, TROPISTAT in the last 48 hours.  Troponin: No results for input(s): TROPONINI, TROPONINIHS in the last 48 hours.  Urine Studies: No results for input(s): COLORU, APPEARANCEUA, PHUR, SPECGRAV, PROTEINUA, GLUCUA, KETONESU, BILIRUBINUA, OCCULTUA, NITRITE, UROBILINOGEN, LEUKOCYTESUR, RBCUA, WBCUA, BACTERIA, SQUAMEPITHEL, HYALINECASTS in the last 48 hours.    Invalid input(s): WRIGHTSUR    Significant Imaging: I have reviewed all pertinent imaging results/findings within the past 24 hours.  I have reviewed and interpreted all pertinent imaging results/findings within the past 24 hours.    Assessment/Plan:     * Hypercalcemia  -not on calcium supplementation.    -calcium elevated 15.2, repeat 14.0.    -unable to aggressively hydrate due to CHF (EF 40%).    -continue NS at 100 cc/hour.    -pamidronate IV x1.    -salmon calcitonin b.i.d. x4 doses.    -nephrology consult.  -check PTH re-evaluate for primary versus secondary hyperparathyroidism  -urine light chains, CT chest, abdomen, pelvis, for malignancy workup      Acute renal failure superimposed on stage 4 chronic kidney disease  -likely secondary to volume depletion from hypercalcemia  -baseline creatinine 2.2, currently elevated 3.1.    -continue gentle IV fluids, NS at 100 cc/hour room CHF.    -repeat labs in a.m..  -nephrology consult.      Chronic combined systolic and diastolic congestive heart failure  Patient is identified as having Combined Systolic and Diastolic heart failure that is Chronic. CHF is currently controlled. Latest ECHO performed and demonstrates- Results for orders placed during the hospital encounter of 10/21/21    Echo    Interpretation Summary  · Concentric hypertrophy and mildly decreased systolic function.  · Mild left atrial enlargement.  · Mild pulmonic regurgitation.  · The estimated ejection fraction is 40%.  · Grade I left ventricular diastolic dysfunction.  · With normal right ventricular systolic  function.  · Mild aortic regurgitation.  · Normal central venous pressure (3 mmHg).  · The estimated PA systolic pressure is 38 mmHg.  · There is mild left ventricular global hypokinesis.  .  Hold diuretics due to hypercalcemia and monitor clinical status closely. Monitor on telemetry. Patient is off CHF pathway.  Monitor strict Is&Os and daily weights.  Place on fluid restriction of 1.5 L. Continue to stress to patient importance of self efficacy and  on diet for CHF. Last BNP reviewed- and noted below No results for input(s): BNP, BNPTRIAGEBLO in the last 168 hours..      COPD, group C, by GOLD 2013 classification  -not in exacerbation      Tobacco user         Dementia          VTE Risk Mitigation (From admission, onward)         Ordered     Place sequential compression device  Until discontinued         04/21/23 0516                   On 04/21/2023, patient should be placed in hospital observation services under my care.        Rex Roberson MD  Department of Hospital Medicine  [facility] - Emergency Dept.

## 2023-04-21 NOTE — CONSULTS
[facility] Surg  Nephrology  Consult Note    Patient Name: Zohaib Wilson Sr.  MRN: [id number]  Admission Date: 4/21/2023  Hospital Length of Stay: 0 days  Attending Provider: Sara Begum MD   Primary Care Physician: Nikki Alfredo MD  Principal Problem:Hypercalcemia  Reason for Consult: LISHA on CKD  Primary Nephrologist: Dr. Nilay Tena       Inpatient consult to Nephrology  Consult performed by: Mayco Orellana MD  Consult ordered by: Rex Roberson MD  Reason for consult: LISHA on CKD      Subjective:     HPI:   73 yo male with CKD and baseline creatinine around 2mg/dL admitted for hypercalcemia noted on labs for work up for weight loss by PCP.     Patient has a history of dementia and has a component of delirium currently thus history other than ROS is obtained from chart.     Admission creatinine was 3 and is similar today. His corrected calcium of 15.3 has improved with treatment of hypercalcemia.    Prescribed home medications reviewed.     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    Tobacco user        Past Surgical History:   Procedure Laterality Date    CATARACT EXTRACTION      LASER OF PROSTATE W/ GREEN LIGHT PVP      ROBOT-ASSISTED REPAIR OF UMBILICAL HERNIA USING DA MARC XI N/A 7/20/2022    Procedure: XI ROBOTIC REPAIR, HERNIA, UMBILICAL;  Surgeon: Justine Sharp DO;  Location: [facility];  Service: General;  Laterality: N/A;    TRANSURETHRAL RESECTION OF PROSTATE         Review of patient's allergies indicates:   Allergen Reactions    Naproxen sodium Hives     Other reaction(s): hives    Iodine      Other reaction(s): hives     Current Facility-Administered Medications   Medication Frequency    0.9%  NaCl infusion Continuous    acetaminophen tablet 650 mg Q6H PRN    albuterol-ipratropium 2.5 mg-0.5 mg/3  mL nebulizer solution 3 mL Q4H PRN    calcitonin injection 342 Units Q12H    guaiFENesin 100 mg/5 ml syrup 200 mg Q4H PRN    ondansetron injection 4 mg Q8H PRN    senna-docusate 8.6-50 mg per tablet 1 tablet BID     Family History    None       Tobacco Use    Smoking status: Every Day     Packs/day: 1.50     Years: 60.00     Pack years: 90.00     Types: Cigarettes     Start date: 1963    Smokeless tobacco: Never   Substance and Sexual Activity    Alcohol use: Not Currently    Drug use: Not Currently    Sexual activity: Not Currently     Review of Systems   Constitutional:  Positive for unexpected weight change.   HENT:  Negative for facial swelling.    Respiratory:  Negative for cough and shortness of breath.    Cardiovascular:  Negative for chest pain and leg swelling.   Allergic/Immunologic: Positive for immunocompromised state.   Psychiatric/Behavioral:  Positive for confusion. The patient is nervous/anxious.    Objective:     Vital Signs (Most Recent):  Temp: 97.7 °F (36.5 °C) (04/21/23 1056)  Pulse: 97 (04/21/23 1056)  Resp: 16 (04/21/23 1056)  BP: (!) 149/78 (04/21/23 1056)  SpO2: 95 % (04/21/23 1056)   Vital Signs (24h Range):  Temp:  [97.7 °F (36.5 °C)-98.7 °F (37.1 °C)] 97.7 °F (36.5 °C)  Pulse:  [81-98] 97  Resp:  [16-18] 16  SpO2:  [95 %-97 %] 95 %  BP: (104-149)/(68-83) 149/78     Weight: 85.7 kg (188 lb 15 oz) (04/21/23 0520)  Body mass index is 24.26 kg/m².  Body surface area is 2.12 meters squared.    No intake/output data recorded.    Physical Exam  Vitals and nursing note reviewed.   Constitutional:       Appearance: He is ill-appearing. He is not toxic-appearing.   Cardiovascular:      Rate and Rhythm: Normal rate.   Pulmonary:      Breath sounds: Wheezing present.   Neurological:      Mental Status: He is alert.   Psychiatric:         Cognition and Memory: Cognition is impaired.       Significant Labs: reviewed    Significant Imaging: CT scan of chest/abd/pelvis reviewed     Assessment/Plan:      Active Diagnoses:    Diagnosis Date Noted POA    PRINCIPAL PROBLEM:  Hypercalcemia [E83.52] 04/21/2023 Yes    Dementia [F03.90] 04/21/2023 Yes    Chronic combined systolic and diastolic congestive heart failure [I50.42] 11/04/2021 Yes    Acute renal failure superimposed on stage 4 chronic kidney disease [N17.9, N18.4] 10/17/2021 Yes    Tobacco user [Z72.0] 09/14/2021 Yes    COPD, group C, by GOLD 2013 classification [J44.9] 09/14/2021 Yes      Problems Resolved During this Admission:       LISHA on CKD baseline sCr around 2mg/dL  - etiology likely from vasoconstriction from hypercalcemia, and component  of volume depletion   - agree with checking for paraprotein disease, will check SPEP  - NSAID use unknown, as has history of use  - give more time to see improvement in kidney function if all from hyperCa2++  - avoid nephrotoxins, keep hemodynamically stable    - follow up urcx    Dementia baseline with current delirium   - hypercalcemia is contributing    Hypercalcemia  - further work up and treatment per  (as Endocrine disorder, not Nephrology)       Thank you for your consult. I will follow-up with patient. Please contact us if you have any additional questions.    Mayco Orellana MD  Nephrology     [employer]

## 2023-04-21 NOTE — ASSESSMENT & PLAN NOTE
-likely secondary to volume depletion from hypercalcemia  -baseline creatinine 2.2, currently elevated 3.1.    -continue gentle IV fluids, NS at 100 cc/hour room CHF.    -repeat labs in a.m..  -nephrology consult.

## 2023-04-21 NOTE — PLAN OF CARE
[facility] - Med Surg  Initial Discharge Assessment       Primary Care Provider: Nikki Alfredo MD    Admission Diagnosis: Hypercalcemia [E83.52]  Confusion [R41.0]  CHF (congestive heart failure) [I50.9]  Weakness [R53.1]  Acute kidney injury superimposed on chronic kidney disease [N17.9, N18.9]    Admission Date: 4/21/2023  Expected Discharge Date:     Discharge Barriers Identified: None    Payor: [employer]ARE / Plan: [employer] 65 / Product Type: Medicare Advantage /     Extended Emergency Contact Information  Primary Emergency Contact: LutzFidelia  Mobile Phone: [phone]  Relation: Daughter  Secondary Emergency Contact: Zohaib Wilson Jr  Mobile Phone: [phone]  Relation: Son    Discharge Plan A: Home Health  Discharge Plan B: Home      [id number] - [place], [place] - [email or web address]  [email or web address]  [place] [address]  Phone: [phone] Fax: [phone]      Initial Assessment (most recent)       Adult Discharge Assessment - 04/21/23 1205          Discharge Assessment    Assessment Type Discharge Planning Assessment     Confirmed/corrected address, phone number and insurance Yes     Confirmed Demographics Correct on Facesheet     Source of Information family     Communicated LINDSAY with patient/caregiver Date not available/Unable to determine     People in Home friend(s)     Do you expect to return to your current living situation? Yes     Do you have help at home or someone to help you manage your care at home? Yes     Who are your caregiver(s) and their phone number(s)? daughter reports PCP ordered  4/20,     Prior to hospitilization cognitive status: Unable to Assess     Current cognitive status: Not Oriented to Time;Not Oriented to Place     Walking or Climbing Stairs ambulation difficulty, requires equipment     Mobility Management rollator     Dressing/Bathing bathing difficulty, assistance 1 person     Equipment  "Currently Used at Home rollator;bedside commode     Readmission within 30 days? No     Patient currently being followed by outpatient case management? No     Do you take prescription medications? Yes     Do you have prescription coverage? Yes     Do you have any problems affording any of your prescribed medications? No     Who is going to help you get home at discharge? daughter     How do you get to doctors appointments? family or friend will provide     Are you on dialysis? No     Do you take coumadin? No     Discharge Plan A Home Health     Discharge Plan B Home     DME Needed Upon Discharge  none     Discharge Plan discussed with: Adult children     Discharge Barriers Identified None                      Spoke to daughter, reports lives with friend "Antonio" who helps patient with adls/ iadls.  Was seen by Dr Alfredo the day before admission and per daughter MD ordered home health.          "

## 2023-04-21 NOTE — ASSESSMENT & PLAN NOTE
Patient is identified as having Combined Systolic and Diastolic heart failure that is Chronic. CHF is currently controlled. Latest ECHO performed and demonstrates- Results for orders placed during the hospital encounter of 10/21/21    Echo    Interpretation Summary  · Concentric hypertrophy and mildly decreased systolic function.  · Mild left atrial enlargement.  · Mild pulmonic regurgitation.  · The estimated ejection fraction is 40%.  · Grade I left ventricular diastolic dysfunction.  · With normal right ventricular systolic function.  · Mild aortic regurgitation.  · Normal central venous pressure (3 mmHg).  · The estimated PA systolic pressure is 38 mmHg.  · There is mild left ventricular global hypokinesis.  .  Hold diuretics due to hypercalcemia and monitor clinical status closely. Monitor on telemetry. Patient is off CHF pathway.  Monitor strict Is&Os and daily weights.  Place on fluid restriction of 1.5 L. Continue to stress to patient importance of self efficacy and  on diet for CHF. Last BNP reviewed- and noted below No results for input(s): BNP, BNPTRIAGEBLO in the last 168 hours..

## 2023-04-21 NOTE — HPI
Mr. Wilson is a 72-year-old  male, with PMH significant for dementia, hypothyroidism, was sent to the ED from PCP clinic due to high calcium of 15.2.  Worsening renal function, creatinine 3.0 (baseline around 2.2).  Due to dementia, patient is a very poor historian.  States that he wants to go home, but currently not oriented to place or person or time.  Repeat blood work shows calcium 14.0.  Patient known history of CHF, EF 40% with grade 2 diastolic dysfunction.  Hence did not receive aggressive IV fluid boluses in the ED. received NS at 75 cc/hour.  CXR, UA, imaging studies pending at this time.    Admitting diagnosis:  Hypercalcemia.

## 2023-04-21 NOTE — ED PROVIDER NOTES
SCRIBE #1 NOTE: I, Cassidy Cage, am scribing for, and in the presence of, Karla Patel MD. I have scribed the entire note.       History     Chief Complaint   Patient presents with    abnormal labs     Pt seen by PCP today with labs. Called for elevated Ca of 15.2 and advised come to ED. Pt hx dementia and disoriented to time, person, place, and events. Pt transported to ED via [facility] (no IV, meds, or EKG in field).     Review of patient's allergies indicates:   Allergen Reactions    Naproxen sodium Hives     Other reaction(s): hives    Iodine      Other reaction(s): hives         History of Present Illness     HPI    4/21/2023, 3:40 AM  History obtained from the daughter    HPI and ROS limited due to acuity of condition      History of Present Illness: Zohaib Wilson Sr. is a 72 y.o. male patient with a PMHx of COPD, hyperlipidemia, hypothyroidism, speech abnormality, and PVD who presents to the Emergency Department for evaluation of abnormal labs. Symptoms are constant and moderate in severity. Pt was seen by his PCP today and received a call that calcium levels were 15.2. Pt is disoriented.  No further complaints or concerns at this time.       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 No pertinent family history.    Social History:  Social History     Tobacco Use    Smoking status: Every Day     Packs/day: 1.50     Years: 60.00     Pack years: 90.00     Types: Cigarettes     Start date: 1963    Smokeless tobacco: Never   Substance and Sexual Activity    Alcohol use: Not Currently    Drug use: Not Currently    Sexual activity: Not Currently        Review of Systems     Review of Systems   Unable to perform ROS: Acuity of condition   Psychiatric/Behavioral:  Positive for confusion.       Physical Exam     Initial Vitals [04/21/23 0141]   BP Pulse Resp Temp SpO2   (!) 144/68 94 16 98.7 °F (37.1 °C) 97 %      MAP       --          Physical Exam  Nursing Notes and Vital Signs Reviewed.  Constitutional: Patient is in mild distress. Well-developed and well-nourished.  Head: Atraumatic. Normocephalic.  Eyes: PERRL. EOM intact. Conjunctivae are not pale. No scleral icterus.  ENT: Mucous membranes are moist. Oropharynx is clear and symmetric.    Neck: Supple. Full ROM. No lymphadenopathy.  Cardiovascular: Regular rate. Regular rhythm. No murmurs, rubs, or gallops. Distal pulses are 2+ and symmetric.  Pulmonary/Chest: No respiratory distress. Clear to auscultation bilaterally. No wheezing or rales.  Abdominal: Soft and non-distended.  There is no tenderness.  No rebound, guarding, or rigidity. Good bowel sounds.  Genitourinary: No CVA tenderness  Musculoskeletal: Moves all extremities. No obvious deformities. No edema. No calf tenderness.  Skin: Warm and dry.  Neurological:  Alert, awake, but confused.  No acute focal neurological deficits are appreciated.  Psychiatric: Normal affect. Good eye contact. Appropriate in content.     ED Course   Critical Care    Date/Time: 4/21/2023 3:56 AM  Performed by: Karla Patel MD  Authorized by: aKrla Patel MD   Direct patient critical care time: 15 minutes  Ordering / reviewing critical care time: 5 minutes  Documentation critical care  "time: 5 minutes  Consulting other physicians critical care time: 5 minutes  Other critical care time: 5 minutes  Total critical care time (exclusive of procedural time) : 35 minutes  Critical care time was exclusive of separately billable procedures and treating other patients and teaching time.  Critical care was necessary to treat or prevent imminent or life-threatening deterioration of the following conditions: hypercalcemia.  Critical care was time spent personally by me on the following activities: blood draw for specimens, discussions with consultants, interpretation of cardiac output measurements, evaluation of patient's response to treatment, examination of patient, obtaining history from patient or surrogate, ordering and performing treatments and interventions, ordering and review of laboratory studies, ordering and review of radiographic studies, pulse oximetry, re-evaluation of patient's condition, review of old charts and development of treatment plan with patient or surrogate.      ED Vital Signs:  Vitals:    04/21/23 0141 04/21/23 0230 04/21/23 0254 04/21/23 0330   BP: (!) 144/68 (!) 141/81  104/83   Pulse: 94 85 81 82   Resp: 16 18  18   Temp: 98.7 °F (37.1 °C)      TempSrc: Oral      SpO2: 97% 97%  95%   Weight:       Height: 6' 2" (1.88 m)       04/21/23 0520 04/21/23 1056 04/21/23 1543 04/21/23 1620   BP: 104/83 (!) 149/78  (!) 119/58   Pulse:  97 84 72   Resp:  16  18   Temp:  97.7 °F (36.5 °C)  97.2 °F (36.2 °C)   TempSrc:  Oral  Oral   SpO2:  95%  97%   Weight: 85.7 kg (188 lb 15 oz)      Height: 6' 2" (1.88 m)       04/21/23 1728 04/21/23 1959   BP:  139/71   Pulse: 93 67   Resp:  18   Temp:  97.6 °F (36.4 °C)   TempSrc:  Oral   SpO2:  95%   Weight:     Height:         Abnormal Lab Results:  Labs Reviewed   COMPREHENSIVE METABOLIC PANEL - Abnormal; Notable for the following components:       Result Value    BUN 48 (*)     Creatinine 3.1 (*)     Calcium 14.0 (*)     eGFR 21 (*)     All other " components within normal limits    Narrative:      CA  critical result(s) called and verbal readback obtained from GENET ESPINOZA RN by [email or web address] 04/21/2023 03:27   URINALYSIS, REFLEX TO URINE CULTURE - Abnormal; Notable for the following components:    Protein, UA 1+ (*)     Leukocytes, UA 2+ (*)     All other components within normal limits    Narrative:     Specimen Source->Urine   PROTEIN, QUANTITATIVE, URINE RANDOM - Abnormal; Notable for the following components:    Protein, Urine Random 32 (*)     All other components within normal limits    Narrative:     Specimen Source->Urine   URINALYSIS MICROSCOPIC - Abnormal; Notable for the following components:    WBC, UA 27 (*)     WBC Clumps, UA Few (*)     Bacteria Few (*)     Yeast, UA Few (*)     All other components within normal limits    Narrative:     Specimen Source->Urine   CULTURE, URINE   PHOSPHORUS   MAGNESIUM   PROCALCITONIN   PTH, INTACT   AMMONIA        All Lab Results:  Results for orders placed or performed during the hospital encounter of 04/21/23   Comprehensive metabolic panel   Result Value Ref Range    Sodium 142 136 - 145 mmol/L    Potassium 4.1 3.5 - 5.1 mmol/L    Chloride 101 95 - 110 mmol/L    CO2 27 23 - 29 mmol/L    Glucose 85 70 - 110 mg/dL    BUN 48 (H) 8 - 23 mg/dL    Creatinine 3.1 (H) 0.5 - 1.4 mg/dL    Calcium 14.0 (HH) 8.7 - 10.5 mg/dL    Total Protein 7.9 6.0 - 8.4 g/dL    Albumin 3.5 3.5 - 5.2 g/dL    Total Bilirubin 0.3 0.1 - 1.0 mg/dL    Alkaline Phosphatase 111 55 - 135 U/L    AST 19 10 - 40 U/L    ALT 33 10 - 44 U/L    Anion Gap 14 8 - 16 mmol/L    eGFR 21 (A) >60 mL/min/1.73 m^2   Phosphorus   Result Value Ref Range    Phosphorus 3.7 2.7 - 4.5 mg/dL   Magnesium   Result Value Ref Range    Magnesium 2.4 1.6 - 2.6 mg/dL   Procalcitonin   Result Value Ref Range    Procalcitonin 0.12 <0.25 ng/mL   Urinalysis, Reflex to Urine Culture Urine, Clean Catch    Specimen: Urine   Result Value Ref Range    Specimen UA Urine, Clean Catch      Color, UA Yellow Yellow, Straw, Nuha    Appearance, UA Clear Clear    pH, UA 7.0 5.0 - 8.0    Specific Gravity, UA 1.010 1.005 - 1.030    Protein, UA 1+ (A) Negative    Glucose, UA Negative Negative    Ketones, UA Negative Negative    Bilirubin (UA) Negative Negative    Occult Blood UA Negative Negative    Nitrite, UA Negative Negative    Urobilinogen, UA Negative <2.0 EU/dL    Leukocytes, UA 2+ (A) Negative   PTH, intact   Result Value Ref Range    PTH, Intact 40.7 9.0 - 77.0 pg/mL   Calcium, ionized   Result Value Ref Range    Ionized Calcium 1.62 (H) 1.06 - 1.42 mmol/L   Ammonia   Result Value Ref Range    Ammonia 22 10 - 50 umol/L   Protein, Random Urine   Result Value Ref Range    Protein, Urine Random 32 (H) 0 - 15 mg/dL   Urinalysis Microscopic   Result Value Ref Range    RBC, UA 0 0 - 4 /hpf    WBC, UA 27 (H) 0 - 5 /hpf    WBC Clumps, UA Few (A) None-Rare    Bacteria Few (A) None-Occ /hpf    Yeast, UA Few (A) None    Hyaline Casts, UA 0 0-1/lpf /lpf    Microscopic Comment SEE COMMENT    Echo   Result Value Ref Range    BSA 2.12 m2    TDI SEPTAL 0.06 m/s    LV LATERAL E/E' RATIO 8.00 m/s    LV SEPTAL E/E' RATIO 9.33 m/s    LA WIDTH 2.20 cm    IVC diameter 1.42 cm    Left Ventricular Outflow Tract Mean Velocity 0.61 cm/s    Left Ventricular Outflow Tract Mean Gradient 1.58 mmHg    Pulmonary Valve Mean Velocity 0.80 m/s    TDI LATERAL 0.07 m/s    PV PEAK VELOCITY 1.07 cm/s    LVIDd 4.09 3.5 - 6.0 cm    IVS 1.39 (A) 0.6 - 1.1 cm    Posterior Wall 1.72 (A) 0.6 - 1.1 cm    Ao root annulus 3.40 cm    LVIDs 3.32 2.1 - 4.0 cm    FS 19 28 - 44 %    LA volume 29.38 cm3    STJ 2.67 cm    Ascending aorta 2.54 cm    LV mass 254.22 g    LA size 3.43 cm    RVDD 2.64 cm    TAPSE 2.50 cm    RV S' 0.02 cm/s    Left Ventricle Relative Wall Thickness 0.84 cm    AV mean gradient 4 mmHg    AV valve area 3.08 cm2    AV Velocity Ratio 0.54     AV index (prosthetic) 0.68     MV valve area p 1/2 method 2.90 cm2    E/A ratio 0.53      Mean e' 0.07 m/s    E wave deceleration time 262.00 msec    IVRT 85.63 msec    LVOT diameter 2.41 cm    LVOT area 4.6 cm2    LVOT peak jose eduardo 0.85 m/s    LVOT peak VTI 16.50 cm    Ao peak jose eduardo 1.56 m/s    Ao VTI 24.4 cm    RVOT peak jose eduardo 0.71 m/s    RVOT peak VTI 11.2 cm    LVOT stroke volume 75.23 cm3    AV peak gradient 10 mmHg    PV mean gradient 1.09 mmHg    E/E' ratio 8.62 m/s    MV Peak E Jose Eduardo 0.56 m/s    MV stenosis pressure 1/2 time 75.98 ms    MV Peak A Jose Eduardo 1.06 m/s    LV Systolic Volume 44.78 mL    LV Systolic Volume Index 21.1 mL/m2    LV Diastolic Volume 73.87 mL    LV Diastolic Volume Index 34.84 mL/m2    LA Volume Index 13.9 mL/m2    LV Mass Index 120 g/m2    RA Major Axis 4.05 cm    Left Atrium Minor Axis 4.72 cm    Left Atrium Major Axis 4.45 cm    Right Atrial Pressure (from IVC) 8 mmHg    EF 55 %        Imaging Results:  Imaging Results              X-Ray Chest 1 View (Final result)  Result time 04/21/23 06:29:24      Final result by Zohaib Dumas MD (04/21/23 06:29:24)                   Impression:      No acute process seen.      Electronically signed by: Zohaib Dumas MD  Date:    04/21/2023  Time:    06:29               Narrative:    EXAMINATION:  XR CHEST 1 VIEW    CLINICAL HISTORY:  Disorientation, unspecified    FINDINGS:  Single view of the chest.  Comparison 02/04/2022    The cardiac and mediastinal silhouettes appear within normal limits.   Diffuse coarsening of the bronchovascular markings and pulmonary interstitium appears similar to multiple prior examinations.  Slightly more prominent left hilar and left infrahilar thickening which was not found to be significant on CT.  No definite focal parenchymal consolidation or pleural effusion demonstrated.  No acute osseous findings demonstrated.                                       CT Head Without Contrast (Final result)  Result time 04/21/23 06:31:44      Final result by Zohaib Dumas MD (04/21/23 06:31:44)                   Impression:       Chronic microvascular ischemic changes.    All CT scans at this facility use dose modulation, iterative reconstruction, and/or weight based dosing when appropriate to reduce radiation dose to as low as reasonable achievable.      Electronically signed by: Zohaib Dumas MD  Date:    04/21/2023  Time:    06:31               Narrative:    EXAMINATION:  CT HEAD WITHOUT CONTRAST    CLINICAL HISTORY:  Mental status change, unknown cause;    TECHNIQUE:  Low dose axial CT images obtained throughout the head without intravenous contrast. Sagittal and coronal reconstructions were performed.    COMPARISON:  None.    FINDINGS:  Intracranial compartment:    The brain parenchyma demonstrates areas of decreased attenuation with moderate periventricular white matter consistent with chronic microvascular ischemic changes..  No parenchymal mass, hemorrhage, edema or major vascular distribution infarct.  Vascular calcifications are noted.    Moderate prominence of the sulci and ventricles are consistent with age-related involutional changes.    No extra-axial blood or fluid collections.    Skull/extracranial contents (limited evaluation): Mild ethmoid mucosal thickening.  No fracture. Bilateral mastoid effusions.  Cannot exclude chronic mastoiditis.  Paranasal sinuses are essentially clear.                                       CT Chest Abdomen Pelvis Without Contrast (XPD) (Final result)  Result time 04/21/23 06:42:25      Final result by Zohaib Dumas MD (04/21/23 06:42:25)                   Impression:      Evaluation of solid organ and vascular pathology is limited due to lack of IV contrast.    Moderate constipation including fecal impaction of the rectum.    Abnormal enlarged paratracheal lymph node measuring 3.2 x 3.1 cm. Recommend pulmonary follow-up    Porcelain gallbladder.    See additional findings and recommendations above.    All CT scans at this facility use dose modulation, iterative reconstruction and/or weight  based dosing when appropriate to reduce radiation dose to as low as reasonably achievable.      Electronically signed by: Zohaib Dumas MD  Date:    04/21/2023  Time:    06:42               Narrative:    EXAMINATION:  CT CHEST ABDOMEN PELVIS WITHOUT CONTRAST(XPD)    CLINICAL HISTORY:  Weight loss, unintended;    TECHNIQUE:  The patient was surveyed from the thoracic inlet through the pelvis without oral and IV contrast.  Data was reconstructed for coronal, sagittal, and axial images.    COMPARISON:  01/14/2021    FINDINGS:  The soft tissues at the base of the neck are unremarkable.  The thyroid gland is normal in size and configuration.  There is no abnormal lymph node enlargement.    Abnormal enlarged paratracheal lymph node measuring 3.2 x 3.1 cm.  Recommend pulmonary follow-up .  the hilar contours are unremarkable.  The esophagus is normal in course and contour.  Mild gynecomastia.    The thoracic aorta is normal in course, caliber, and contour without significant atherosclerotic calcifications.The heart does not appear enlarged and there is no pericardial effusion.  Moderate to severe coronary artery disease.    The trachea and proximal airways are patent.    Diffuse chronic interstitial lung disease.  Question atelectasis or small infiltrate within the right upper lobe.  Motion limited.  Significant emphysematous changes are seen throughout the upper lobes.    The liver is normal in size and attenuation.  Several indeterminate hypodense lesions are seen within the liver measuring up to 14 mm not fully characterized on noncontrast imaging..  Moderate gallbladder distension with calcification which could reflect porcelain gallbladder there is no intra-or extrahepatic biliary ductal dilatation.    The stomach, spleen, pancreas, and adrenal glands are unremarkable.    Bilateral renal cortical thinning.  Mild nonspecific perinephric stranding.  Punctate nonobstructing stones bilaterally.  Vascular calcifications  are also noted.  13 mm hypodense lesion within the upper pole right kidney not fully characterized on noncontrast imaging but likely reflects a cyst.  Additional indeterminate lesions seen within the midpole right kidney measuring 2.3 cm.  Consider follow-up ultrasound.  There is no evidence of hydronephrosis.  The ureters appear normal in course and caliber without evidence of ureteral dilatation. Mild nonspecific bladder wall thickening which can be seen with cystitis.    The abdominal aorta is normal in course and caliber with severe atherosclerotic calcifications.  Small bilateral fat containing inguinal hernias.    The visualized loops of small bowel show no evidence of obstruction or inflammation. Large bowel is unremarkable.  Moderate constipation.  There is no ascites, free fluid, or intraperitoneal free air.    Shotty retroperitoneal nodes.    Diffuse osteopenia.  Moderate degenerative changes throughout the spine greatest within the lower lumbar spine..  Disc space narrowing and posterior disc osteophyte complex and facet arthropathy noted at L5/S1 producing moderate neural foraminal narrowing bilaterally.  Suspect degenerative pars defects at L4.    Superior endplate wedge deformities at L1 and L2.  Mild sclerotic changes within these bones.  MRI or bone scan can be obtained as clinically warranted.                                       The EKG was ordered, reviewed, and independently interpreted by the ED provider.  Interpretation time: 3:18  Rate: 84 BPM  Rhythm:  Sinus rhythm with occasional premature ventricular complexes  Interpretation: Left axis deviation.   Incomplete right bundle branch block.   Inferior infarct, age undetermined. No STEMI.             The Emergency Provider reviewed the vital signs and test results, which are outlined above.     ED Discussion     3:53 AM: Discussed case with Bri Patricia NP ([facility]). Dr. Roberson agrees with current care and management of pt and  accepts admission.   Admitting Service: Hospital Medicine  Admitting Physician: Dr. Roberson  Admit to: obs tele     3:54 AM: Re-evaluated pt. I have discussed test results, shared treatment plan, and the need for admission with patient and family at bedside. Pt and family express understanding at this time and agree with all information. All questions answered. Pt and family have no further questions or concerns at this time. Pt is ready for admit.         Medical Decision Making:   Clinical Tests:   Lab Tests: Ordered and Reviewed  Medical Tests: Ordered and Reviewed         ED Medication(s):  Medications   acetaminophen tablet 650 mg (has no administration in time range)   ondansetron injection 4 mg (has no administration in time range)   guaiFENesin 100 mg/5 ml syrup 200 mg (has no administration in time range)   albuterol-ipratropium 2.5 mg-0.5 mg/3 mL nebulizer solution 3 mL (has no administration in time range)   0.9%  NaCl infusion ( Intravenous New Bag 4/21/23 1350)   senna-docusate 8.6-50 mg per tablet 1 tablet (1 tablet Oral Not Given 4/21/23 2100)   calcitonin injection 342 Units (342 Units Subcutaneous Given 4/21/23 0628)   baclofen tablet 5 mg (has no administration in time range)   0.9%  NaCl infusion (0 mLs Intravenous Stopped 4/21/23 0400)   pamidronate (AREDIA) 90 mg in sodium chloride 0.9% 250 mL infusion (0 mg Intravenous Stopped 4/21/23 0846)       Current Discharge Medication List                  Scribe Attestation:   Scribe #1: I performed the above scribed service and the documentation accurately describes the services I performed. I attest to the accuracy of the note.     Attending:   Physician Attestation Statement for Scribe #1: I, Karla Patel MD, personally performed the services described in this documentation, as scribed by Cassidy Cage, in my presence, and it is both accurate and complete.           Clinical Impression       ICD-10-CM ICD-9-CM   1. Acute kidney injury superimposed  on chronic kidney disease  N17.9 584.9    N18.9 585.9   2. Weakness  R53.1 780.79   3. Hypercalcemia  E83.52 275.42   4. Confusion  R41.0 298.9   5. CHF (congestive heart failure)  I50.9 428.0       Disposition:   Disposition: Placed in Observation  Condition: Fair      Karla Patel MD  04/21/23 2047

## 2023-04-21 NOTE — ASSESSMENT & PLAN NOTE
-not on calcium supplementation.    -calcium elevated 15.2, repeat 14.0.    -unable to aggressively hydrate due to CHF (EF 40%).    -continue NS at 100 cc/hour.    -pamidronate IV x1.    -salmon calcitonin b.i.d. x4 doses.    -nephrology consult.  -check PTH re-evaluate for primary versus secondary hyperparathyroidism  -urine light chains, CT chest, abdomen, pelvis, for malignancy workup

## 2023-04-22 VITALS
DIASTOLIC BLOOD PRESSURE: 57 MMHG | BODY MASS INDEX: 25.68 KG/M2 | SYSTOLIC BLOOD PRESSURE: 128 MMHG | TEMPERATURE: 98 F | RESPIRATION RATE: 18 BRPM | OXYGEN SATURATION: 95 % | HEART RATE: 76 BPM | WEIGHT: 189.63 LBS | HEIGHT: 72 IN

## 2023-04-22 LAB
ALBUMIN SERPL BCP-MCNC: 2.7 G/DL (ref 3.5–5.2)
ALP SERPL-CCNC: 83 U/L (ref 55–135)
ALT SERPL W/O P-5'-P-CCNC: 27 U/L (ref 10–44)
ANION GAP SERPL CALC-SCNC: 2 MMOL/L (ref 8–16)
AST SERPL-CCNC: 21 U/L (ref 10–40)
BASOPHILS # BLD AUTO: 0.03 K/UL (ref 0–0.2)
BASOPHILS NFR BLD: 0.5 % (ref 0–1.9)
BILIRUB SERPL-MCNC: 0.4 MG/DL (ref 0.1–1)
BUN SERPL-MCNC: 37 MG/DL (ref 8–23)
CALCIUM SERPL-MCNC: 9.7 MG/DL (ref 8.7–10.5)
CHLORIDE SERPL-SCNC: 108 MMOL/L (ref 95–110)
CO2 SERPL-SCNC: 20 MMOL/L (ref 23–29)
CREAT SERPL-MCNC: 2.3 MG/DL (ref 0.5–1.4)
DIFFERENTIAL METHOD: ABNORMAL
EOSINOPHIL # BLD AUTO: 0.1 K/UL (ref 0–0.5)
EOSINOPHIL NFR BLD: 1 % (ref 0–8)
ERYTHROCYTE [DISTWIDTH] IN BLOOD BY AUTOMATED COUNT: 15.6 % (ref 11.5–14.5)
EST. GFR  (NO RACE VARIABLE): 29 ML/MIN/1.73 M^2
GLUCOSE SERPL-MCNC: 85 MG/DL (ref 70–110)
HCT VFR BLD AUTO: 32.9 % (ref 40–54)
HGB BLD-MCNC: 11 G/DL (ref 14–18)
IMM GRANULOCYTES # BLD AUTO: 0.04 K/UL (ref 0–0.04)
IMM GRANULOCYTES NFR BLD AUTO: 0.7 % (ref 0–0.5)
LYMPHOCYTES # BLD AUTO: 1.3 K/UL (ref 1–4.8)
LYMPHOCYTES NFR BLD: 21.7 % (ref 18–48)
MAGNESIUM SERPL-MCNC: 1.8 MG/DL (ref 1.6–2.6)
MCH RBC QN AUTO: 30.6 PG (ref 27–31)
MCHC RBC AUTO-ENTMCNC: 33.4 G/DL (ref 32–36)
MCV RBC AUTO: 92 FL (ref 82–98)
MONOCYTES # BLD AUTO: 0.6 K/UL (ref 0.3–1)
MONOCYTES NFR BLD: 9.6 % (ref 4–15)
NEUTROPHILS # BLD AUTO: 4 K/UL (ref 1.8–7.7)
NEUTROPHILS NFR BLD: 66.5 % (ref 38–73)
NRBC BLD-RTO: 0 /100 WBC
PHOSPHATE SERPL-MCNC: 2.3 MG/DL (ref 2.7–4.5)
PLATELET # BLD AUTO: 138 K/UL (ref 150–450)
PMV BLD AUTO: 10.8 FL (ref 9.2–12.9)
POTASSIUM SERPL-SCNC: 3.5 MMOL/L (ref 3.5–5.1)
PROT SERPL-MCNC: 6 G/DL (ref 6–8.4)
RBC # BLD AUTO: 3.59 M/UL (ref 4.6–6.2)
SODIUM SERPL-SCNC: 130 MMOL/L (ref 136–145)
TSH SERPL DL<=0.005 MIU/L-ACNC: 2.52 UIU/ML (ref 0.4–4)
WBC # BLD AUTO: 6.03 K/UL (ref 3.9–12.7)

## 2023-04-22 PROCEDURE — 85025 COMPLETE CBC W/AUTO DIFF WBC: CPT | Performed by: INTERNAL MEDICINE

## 2023-04-22 PROCEDURE — 84443 ASSAY THYROID STIM HORMONE: CPT | Performed by: INTERNAL MEDICINE

## 2023-04-22 PROCEDURE — 80053 COMPREHEN METABOLIC PANEL: CPT | Performed by: INTERNAL MEDICINE

## 2023-04-22 PROCEDURE — 83735 ASSAY OF MAGNESIUM: CPT | Performed by: INTERNAL MEDICINE

## 2023-04-22 PROCEDURE — 25000003 PHARM REV CODE 250: Performed by: STUDENT IN AN ORGANIZED HEALTH CARE EDUCATION/TRAINING PROGRAM

## 2023-04-22 PROCEDURE — 36415 COLL VENOUS BLD VENIPUNCTURE: CPT | Performed by: INTERNAL MEDICINE

## 2023-04-22 PROCEDURE — 97167 OT EVAL HIGH COMPLEX 60 MIN: CPT

## 2023-04-22 PROCEDURE — 84100 ASSAY OF PHOSPHORUS: CPT | Performed by: INTERNAL MEDICINE

## 2023-04-22 PROCEDURE — 97163 PT EVAL HIGH COMPLEX 45 MIN: CPT

## 2023-04-22 PROCEDURE — 25000003 PHARM REV CODE 250: Performed by: INTERNAL MEDICINE

## 2023-04-22 RX ORDER — IPRATROPIUM BROMIDE 0.5 MG/2.5ML
0.5 SOLUTION RESPIRATORY (INHALATION) EVERY 6 HOURS
Status: DISCONTINUED | OUTPATIENT
Start: 2023-04-22 | End: 2023-04-22

## 2023-04-22 RX ORDER — BUDESONIDE 0.5 MG/2ML
0.5 INHALANT ORAL EVERY 12 HOURS
Status: DISCONTINUED | OUTPATIENT
Start: 2023-04-22 | End: 2023-04-22 | Stop reason: HOSPADM

## 2023-04-22 RX ORDER — LEVOTHYROXINE SODIUM 25 UG/1
25 TABLET ORAL
Status: DISCONTINUED | OUTPATIENT
Start: 2023-04-23 | End: 2023-04-22 | Stop reason: HOSPADM

## 2023-04-22 RX ORDER — POTASSIUM CHLORIDE 20 MEQ/1
20 TABLET, EXTENDED RELEASE ORAL DAILY
Status: DISCONTINUED | OUTPATIENT
Start: 2023-04-22 | End: 2023-04-22 | Stop reason: HOSPADM

## 2023-04-22 RX ORDER — BUDESONIDE 0.5 MG/2ML
0.5 INHALANT ORAL EVERY 12 HOURS
Status: DISCONTINUED | OUTPATIENT
Start: 2023-04-22 | End: 2023-04-22

## 2023-04-22 RX ORDER — IPRATROPIUM BROMIDE 0.5 MG/2.5ML
0.5 SOLUTION RESPIRATORY (INHALATION) EVERY 6 HOURS
Status: DISCONTINUED | OUTPATIENT
Start: 2023-04-22 | End: 2023-04-22 | Stop reason: HOSPADM

## 2023-04-22 RX ORDER — ARFORMOTEROL TARTRATE 15 UG/2ML
15 SOLUTION RESPIRATORY (INHALATION) 2 TIMES DAILY
Status: DISCONTINUED | OUTPATIENT
Start: 2023-04-22 | End: 2023-04-22 | Stop reason: HOSPADM

## 2023-04-22 RX ADMIN — POTASSIUM CHLORIDE 20 MEQ: 1500 TABLET, EXTENDED RELEASE ORAL at 09:04

## 2023-04-22 RX ADMIN — SENNOSIDES AND DOCUSATE SODIUM 1 TABLET: 50; 8.6 TABLET ORAL at 09:04

## 2023-04-22 NOTE — PLAN OF CARE
Problem: Adult Inpatient Plan of Care  Goal: Plan of Care Review  Outcome: Ongoing, Progressing  Patient had no adverse events during shift. Patient free of falls. Call light in reach. Side Rails x2. Pain well controlled with ordered medications. Patient repositions independently. IVF/ABX administered as ordered. VSS. Heart monitor in place. Foam dressing to sacrum, right lateral hip. Chart reviewed.

## 2023-04-22 NOTE — PT/OT/SLP EVAL
Physical Therapy Evaluation    Patient Name:  Zohaib Wilson Sr.   MRN:  [id number]    Recommendations:     Discharge Recommendations: nursing facility, basic, home (vs home with 24/7 spv)   Discharge Equipment Recommendations: wheelchair   Barriers to discharge: Decreased caregiver support    Assessment:     Zohaib Wilson Sr. is a 72 y.o. male admitted with a medical diagnosis of Hypercalcemia.  Due to cognitive deficits and unwillingness to participate is not appropriate for PT. Will need 24/7 spv after DC    Rehab Prognosis: Poor; patient would benefit from acute skilled PT services to address these deficits and reach maximum level of function.    Recent Surgery: * No surgery found *      Plan:     During this hospitalization, patient to be seen   to address the identified rehab impairments via   and progress toward the following goals:    Plan of Care Expires:       Subjective     Chief Complaint: none  Patient/Family Comments/goals: go home  Pain/Comfort:       Patients cultural, spiritual, Holiness conflicts given the current situation:      Living Environment:  Pt poor historian. Lives with a friend and uses WC  Prior to admission, patients level of function was needs assist.  Equipment used at home:  .  DME owned (not currently used): none.  Upon discharge, patient will have assistance from facility/family.    Objective:     Communicated with Epic and nursing prior to session.  Patient found sitting edge of bed with    nursing in room upon PT entry to room.    General Precautions: Standard,    Orthopedic Precautions:    Braces:    Respiratory Status: Room air    Exams:  RLE ROM: Deficits: impaired  RLE Strength: Deficits: impaired  LLE ROM: Deficits: impaired  LLE Strength: Deficits: impaired    Functional Mobility:  Bed Mobility:     Supine to Sit: maximal assistance  Sit to Supine: maximal assistance  Transfers:     Sit to Stand:  maximal assistance with rolling walker      AM-PAC 6 CLICK  MOBILITY  Total Score:        Treatment & Education:  Max a for safety. Pt very agitated and impulsive.     Patient left supine with all lines intact, call button in reach, and bed alarm on.    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

## 2023-04-22 NOTE — SUBJECTIVE & OBJECTIVE
Interval History: NAEON. Hypercalcemia resolved on AM labs    Review of Systems  Objective:     Vital Signs (Most Recent):  Temp: 97.9 °F (36.6 °C) (04/22/23 0754)  Pulse: 73 (04/22/23 0754)  Resp: 18 (04/22/23 0754)  BP: (!) 118/58 (04/22/23 0754)  SpO2: 98 % (04/22/23 0754)   Vital Signs (24h Range):  Temp:  [97.2 °F (36.2 °C)-98.3 °F (36.8 °C)] 97.9 °F (36.6 °C)  Pulse:  [67-97] 73  Resp:  [16-18] 18  SpO2:  [95 %-98 %] 98 %  BP: (105-149)/(58-78) 118/58     Weight: 86 kg (189 lb 9.5 oz)  Body mass index is 24.34 kg/m².    Intake/Output Summary (Last 24 hours) at 4/22/2023 0805  Last data filed at 4/22/2023 0619  Gross per 24 hour   Intake 659.49 ml   Output 1650 ml   Net -990.51 ml      Physical Exam  Constitutional:       Appearance: He is ill-appearing.   HENT:      Head: Normocephalic and atraumatic.      Mouth/Throat:      Mouth: Mucous membranes are dry.   Eyes:      Conjunctiva/sclera: Conjunctivae normal.      Pupils: Pupils are equal, round, and reactive to light.   Cardiovascular:      Rate and Rhythm: Normal rate and regular rhythm.   Pulmonary:      Effort: Pulmonary effort is normal. No respiratory distress.   Abdominal:      General: There is no distension.      Tenderness: There is no abdominal tenderness.   Musculoskeletal:         General: No swelling.      Cervical back: No rigidity.   Skin:     Coloration: Skin is not jaundiced.   Neurological:      Mental Status: He is alert. He is disoriented.      Comments: Patient not alert to place, person and time.      Significant Labs: BMP:   Recent Labs   Lab 04/22/23  0532   GLU 85   *   K 3.5      CO2 20*   BUN 37*   CREATININE 2.3*   CALCIUM 9.7   MG 1.8     CBC:   Recent Labs   Lab 04/20/23  1507 04/22/23  0532   WBC 7.28 6.03   HGB 14.1 11.0*   HCT 43.8 32.9*    138*     CMP:   Recent Labs   Lab 04/20/23  1507 04/21/23  0251 04/22/23  0532    142 130*   K 4.5 4.1 3.5    101 108   CO2 27 27 20*   GLU 86 85 85   BUN  42* 48* 37*   CREATININE 3.0* 3.1* 2.3*   CALCIUM 15.2* 14.0* 9.7   PROT 7.5 7.9 6.0   ALBUMIN 3.8 3.5 2.7*   BILITOT 0.4 0.3 0.4   ALKPHOS 118 111 83   AST 21 19 21   ALT 35 33 27   ANIONGAP 10 14 2*     Cardiac Markers: No results for input(s): CKMB, MYOGLOBIN, BNP, TROPISTAT in the last 48 hours.  Coagulation: No results for input(s): PT, INR, APTT in the last 48 hours.  Lactic Acid: No results for input(s): LACTATE in the last 48 hours.  Magnesium:   Recent Labs   Lab 04/21/23  0251 04/22/23  0532   MG 2.4 1.8     Troponin: No results for input(s): TROPONINI, TROPONINIHS in the last 48 hours.  TSH:   Recent Labs   Lab 04/20/23  1507   TSH 5.246*     Urine Studies:   Recent Labs   Lab 04/21/23  0541   COLORU Yellow   APPEARANCEUA Clear   PHUR 7.0   SPECGRAV 1.010   PROTEINUA 1+*   GLUCUA Negative   KETONESU Negative   BILIRUBINUA Negative   OCCULTUA Negative   NITRITE Negative   UROBILINOGEN Negative   LEUKOCYTESUR 2+*   RBCUA 0   WBCUA 27*   BACTERIA Few*   HYALINECASTS 0         Significant Imaging:   Imaging Results              X-Ray Chest 1 View (Final result)  Result time 04/21/23 06:29:24      Final result by Zohaib Dumas MD (04/21/23 06:29:24)                   Impression:      No acute process seen.      Electronically signed by: Zohaib Dumas MD  Date:    04/21/2023  Time:    06:29               Narrative:    EXAMINATION:  XR CHEST 1 VIEW    CLINICAL HISTORY:  Disorientation, unspecified    FINDINGS:  Single view of the chest.  Comparison 02/04/2022    The cardiac and mediastinal silhouettes appear within normal limits.   Diffuse coarsening of the bronchovascular markings and pulmonary interstitium appears similar to multiple prior examinations.  Slightly more prominent left hilar and left infrahilar thickening which was not found to be significant on CT.  No definite focal parenchymal consolidation or pleural effusion demonstrated.  No acute osseous findings demonstrated.                                        CT Head Without Contrast (Final result)  Result time 04/21/23 06:31:44      Final result by Zohaib Dumas MD (04/21/23 06:31:44)                   Impression:      Chronic microvascular ischemic changes.    All CT scans at this facility use dose modulation, iterative reconstruction, and/or weight based dosing when appropriate to reduce radiation dose to as low as reasonable achievable.      Electronically signed by: Zohaib Dumas MD  Date:    04/21/2023  Time:    06:31               Narrative:    EXAMINATION:  CT HEAD WITHOUT CONTRAST    CLINICAL HISTORY:  Mental status change, unknown cause;    TECHNIQUE:  Low dose axial CT images obtained throughout the head without intravenous contrast. Sagittal and coronal reconstructions were performed.    COMPARISON:  None.    FINDINGS:  Intracranial compartment:    The brain parenchyma demonstrates areas of decreased attenuation with moderate periventricular white matter consistent with chronic microvascular ischemic changes..  No parenchymal mass, hemorrhage, edema or major vascular distribution infarct.  Vascular calcifications are noted.    Moderate prominence of the sulci and ventricles are consistent with age-related involutional changes.    No extra-axial blood or fluid collections.    Skull/extracranial contents (limited evaluation): Mild ethmoid mucosal thickening.  No fracture. Bilateral mastoid effusions.  Cannot exclude chronic mastoiditis.  Paranasal sinuses are essentially clear.                                       CT Chest Abdomen Pelvis Without Contrast (XPD) (Final result)  Result time 04/21/23 06:42:25      Final result by Zohaib Dumas MD (04/21/23 06:42:25)                   Impression:      Evaluation of solid organ and vascular pathology is limited due to lack of IV contrast.    Moderate constipation including fecal impaction of the rectum.    Abnormal enlarged paratracheal lymph node measuring 3.2 x 3.1 cm. Recommend pulmonary  follow-up    Porcelain gallbladder.    See additional findings and recommendations above.    All CT scans at this facility use dose modulation, iterative reconstruction and/or weight based dosing when appropriate to reduce radiation dose to as low as reasonably achievable.      Electronically signed by: Zohaib Dumas MD  Date:    04/21/2023  Time:    06:42               Narrative:    EXAMINATION:  CT CHEST ABDOMEN PELVIS WITHOUT CONTRAST(XPD)    CLINICAL HISTORY:  Weight loss, unintended;    TECHNIQUE:  The patient was surveyed from the thoracic inlet through the pelvis without oral and IV contrast.  Data was reconstructed for coronal, sagittal, and axial images.    COMPARISON:  01/14/2021    FINDINGS:  The soft tissues at the base of the neck are unremarkable.  The thyroid gland is normal in size and configuration.  There is no abnormal lymph node enlargement.    Abnormal enlarged paratracheal lymph node measuring 3.2 x 3.1 cm.  Recommend pulmonary follow-up .  the hilar contours are unremarkable.  The esophagus is normal in course and contour.  Mild gynecomastia.    The thoracic aorta is normal in course, caliber, and contour without significant atherosclerotic calcifications.The heart does not appear enlarged and there is no pericardial effusion.  Moderate to severe coronary artery disease.    The trachea and proximal airways are patent.    Diffuse chronic interstitial lung disease.  Question atelectasis or small infiltrate within the right upper lobe.  Motion limited.  Significant emphysematous changes are seen throughout the upper lobes.    The liver is normal in size and attenuation.  Several indeterminate hypodense lesions are seen within the liver measuring up to 14 mm not fully characterized on noncontrast imaging..  Moderate gallbladder distension with calcification which could reflect porcelain gallbladder there is no intra-or extrahepatic biliary ductal dilatation.    The stomach, spleen, pancreas, and  adrenal glands are unremarkable.    Bilateral renal cortical thinning.  Mild nonspecific perinephric stranding.  Punctate nonobstructing stones bilaterally.  Vascular calcifications are also noted.  13 mm hypodense lesion within the upper pole right kidney not fully characterized on noncontrast imaging but likely reflects a cyst.  Additional indeterminate lesions seen within the midpole right kidney measuring 2.3 cm.  Consider follow-up ultrasound.  There is no evidence of hydronephrosis.  The ureters appear normal in course and caliber without evidence of ureteral dilatation. Mild nonspecific bladder wall thickening which can be seen with cystitis.    The abdominal aorta is normal in course and caliber with severe atherosclerotic calcifications.  Small bilateral fat containing inguinal hernias.    The visualized loops of small bowel show no evidence of obstruction or inflammation. Large bowel is unremarkable.  Moderate constipation.  There is no ascites, free fluid, or intraperitoneal free air.    Shotty retroperitoneal nodes.    Diffuse osteopenia.  Moderate degenerative changes throughout the spine greatest within the lower lumbar spine..  Disc space narrowing and posterior disc osteophyte complex and facet arthropathy noted at L5/S1 producing moderate neural foraminal narrowing bilaterally.  Suspect degenerative pars defects at L4.    Superior endplate wedge deformities at L1 and L2.  Mild sclerotic changes within these bones.  MRI or bone scan can be obtained as clinically warranted.

## 2023-04-22 NOTE — PT/OT/SLP EVAL
Occupational Therapy   Evaluation    Name: Zohaib Wilson Sr.  MRN: [id number]  Admitting Diagnosis: Hypercalcemia  Recent Surgery: * No surgery found *      Recommendations:     Discharge Recommendations: nursing facility, skilled  Discharge Equipment Recommendations:  to be determined by next level of care  Barriers to discharge:  Decreased caregiver support    Assessment:     Zohaib Wilson Sr. is a 72 y.o. male with a medical diagnosis of Hypercalcemia.  He presents with generalized weakness and debility. Performance deficits affecting function: weakness, impaired endurance, impaired self care skills, gait instability, impaired functional mobility, impaired balance, decreased lower extremity function, impaired cognition, decreased safety awareness, impaired cardiopulmonary response to activity.      Rehab Prognosis: Fair; patient would benefit from acute skilled OT services to address these deficits and reach maximum level of function.       Plan:     Patient to be seen 2 x/week to address the above listed problems via self-care/home management, therapeutic activities, therapeutic exercises  Plan of Care Expires: 05/06/23  Plan of Care Reviewed with: patient    Subjective     Chief Complaint: Generalized weakness and debility   Patient/Family Comments/goals: To return to [facility]     Occupational Profile:  Living Environment: Pt appeared to be a unreliable historian. Pt reported he lives in [facility] alone. Per CM note, pt lives with friend.   Previous level of function: FEDE secondary to pt cognition and providing clear answers   Roles and Routines: Pt does not drive or work   Equipment Used at Home: rollator, bedside commode, wheelchair  Assistance upon Discharge: FEDE     Pain/Comfort:  Pain Rating 1: 0/10    Patients cultural, spiritual, Cheondoism conflicts given the current situation: no    Objective:     Communicated with: Nurse Noonan and epic chart review  prior to session.  Patient found sitting edge of bed with  telemetry, bed alarm, peripheral IV upon OT entry to room.    General Precautions: Standard, fall  Orthopedic Precautions: N/A  Braces: N/A  Respiratory Status: Room air    Occupational Performance:    Bed Mobility:    Patient completed Scooting/Bridging with supervision  Patient completed Sit to Supine with supervision    Functional Mobility/Transfers:  Patient completed Sit <> Stand Transfer with minimum assistance  with  rolling walker   Functional Mobility: NT secondary to poor safety awareness and impulsivity     Activities of Daily Living:  Lower Body Dressing: maximal assistance ; to don socks while seated EOB     Cognitive/Visual Perceptual:  Cognitive/Psychosocial Skills:     -       Oriented to: Person and Place   -       Follows Commands/attention:Easily distracted  -       Communication: clear/fluent  -       Safety awareness/insight to disability: impaired     Physical Exam:  Balance:    -       Static sit Supervision/ Min A, Dynamic Sit: Min A, Static stand: Min A   Upper Extremity Range of Motion:     -       Right Upper Extremity: WFL  -       Left Upper Extremity: WFL  Upper Extremity Strength:    -       Right Upper Extremity: WFL  -       Left Upper Extremity: WFL   Strength:    -       Right Upper Extremity: WFL  -       Left Upper Extremity: WFL    AMPAC 6 Click ADL:  AMPAC Total Score: 15    Treatment & Education:  OT eval and tx completed per MD order. Based on PLOF and performance this date, acute OT services are recommended. Pt educated on purpose of occupational therapy and benefits of active participation. Pt educated on fall prevention and use of call button. Pt educated on benefits of OOB ax and completion of therex to improve endurance and muscle strength. Reinforcement of education recommended.       Patient left supine with all lines intact, call button in reach, and bed alarm on    GOALS:   Multidisciplinary Problems       Occupational Therapy Goals          Problem: Occupational  Therapy    Goal Priority Disciplines Outcome Interventions   Occupational Therapy Goal     OT, PT/OT Ongoing, Progressing    Description: To improve safety and increase independence during ADLs and functional tasks:    Pt will perform UE dressing supervision   Pt will perform g/h supervision from seated level   Pt will perform toileting Min A                        History:     Past Medical History:   Diagnosis Date    Cataract     CKD (chronic kidney disease)     COPD (chronic obstructive pulmonary disease)     Hyperlipidemia     Hypothyroidism     PVD (peripheral vascular disease)     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    Tobacco user          Past Surgical History:   Procedure Laterality Date    CATARACT EXTRACTION      LASER OF PROSTATE W/ GREEN LIGHT PVP      ROBOT-ASSISTED REPAIR OF UMBILICAL HERNIA USING DA MARC XI N/A 7/20/2022    Procedure: XI ROBOTIC REPAIR, HERNIA, UMBILICAL;  Surgeon: Justine Sharp DO;  Location: [facility];  Service: General;  Laterality: N/A;    TRANSURETHRAL RESECTION OF PROSTATE         Time Tracking:     OT Date of Treatment: 04/22/23  OT Start Time: 0930  OT Stop Time: 0945  OT Total Time (min): 15 min    Billable Minutes:Evaluation 15 Mins    4/22/2023

## 2023-04-22 NOTE — PLAN OF CARE
Recommendations  1) Continue reguar diet as tolerated   2) Add Boost breeze BID- low in Ca   3) Assist with meals if needed   4) weigh weekly, NFPE at f/u by on-site RD    Goals: 1) PO Intakes > 50% of meals and supplements at f/u  Nutrition Goal Status: new  Communication of RD Recs:  (POC, sticky note)

## 2023-04-22 NOTE — PLAN OF CARE
Discussed poc with pt, pt verbalized understanding    Purposeful rounding every 2hours    VS wnl  Cardiac monitoring in use, pt is NSR, tele monitor # ____  Blood glucose monitoring   Fall precautions in place, remains injury free  Pt denies c/o ____  Pain and nausea under control with PRN meds    IVFs  Accurate I&Os  Abx given as prescribed  Bed locked at lowest position  Call light within reach    Chart check complete  Will cont with POC

## 2023-04-22 NOTE — PROGRESS NOTES
[facility] - Med Surg  Adult Nutrition  Progress Note    SUMMARY       Recommendations  1) Continue reguar diet as tolerated   2) Add Boost breeze BID- low in Ca   3) Assist with meals if needed   4) weigh weekly, NFPE at f/u by on-site RD    Goals: 1) PO Intakes > 50% of meals and supplements at f/u  Nutrition Goal Status: new  Communication of RD Recs:  (POC, sticky note)    Assessment and Plan    Inadequate energy intake  R/t AMS  As evidenced by PO intakes < 75% of needs x at least 1 day, 15% wt loss x 1yr 2 months  Intervention: general healthful diet, feeding assistance, nutrition supplement therapy   new    Malnutrition Assessment     Skin (Micronutrient):  (Louie = 16, skin tears to groin and thigh)  Teeth (Micronutrient):  (missing some)   Micronutrient Evaluation Summary: suspected deficiency  Micronutrient Evaluation Comments: Na, check iron   Weight Loss (Malnutrition):  (15% x > 1 year)                         Reason for Assessment    Reason For Assessment: identified at risk by screening criteria  Diagnosis:  (hypercalcemia)  Relevant Medical History: dementia, hypothyroidism  Interdisciplinary Rounds: did not attend (remote)    General Information Comments: 71 y/o male admitted with hypercalcemia, plan for d/c today, labs corrected and pt back to baseline mental status. One PO intake recorded, 50% of a meal. NFPE to be done by on-site RD at f/u. Insignificant wt loss per chart review.    Nutrition Discharge Planning: To be determined- regular diet + Boost plus as needed    Nutrition Risk Screen    Nutrition Risk Screen: no indicators present    Nutrition/Diet History    Spiritual, Cultural Beliefs, Jain Practices, Values that Affect Care: no  Food Allergies: NKFA  Factors Affecting Nutritional Intake: impaired cognitive status/motor control    Anthropometrics    Temp: 98.3 °F (36.8 °C)  Height Method: Measured (office 2/4/22)  Height: 6'  Height (inches): 72 in  Weight Method: Bed Scale  Weight: 86  kg (189 lb 9.5 oz)  Weight (lb): 189.6 lb  Ideal Body Weight (IBW), Male: 178 lb  % Ideal Body Weight, Male (lb): 106.52 %  BMI (Calculated): 25.7  BMI Grade: 25 - 29.9 - overweight  Usual Body Weight (UBW), k.3 kg (22)  % Usual Body Weight: 84.24  % Weight Change From Usual Weight: -15.93 %       Lab/Procedures/Meds    Pertinent Labs Reviewed: reviewed  BMP  Lab Results   Component Value Date     (L) 2023    K 3.5 2023     2023    CO2 20 (L) 2023    BUN 37 (H) 2023    CREATININE 2.3 (H) 2023    CALCIUM 9.7 2023    ANIONGAP 2 (L) 2023    EGFRNORACEVR 29 (A) 2023     Lab Results   Component Value Date    CALCIUM 9.7 2023    PHOS 2.3 (L) 2023     Lab Results   Component Value Date    ALBUMIN 2.7 (L) 2023       Pertinent Medications Reviewed: reviewed  Pertinent Medications Comments: KCl, senna, zofran    Estimated/Assessed Needs    Weight Used For Calorie Calculations: 86 kg (189 lb 9.5 oz)  Energy Calorie Requirements (kcal): MSJ ( x 1.2) = 1980 kcal  Energy Need Method: Riggins-St Le  Protein Requirements: 0.8-1 g protein/kg ( 68-86 g)  Weight Used For Protein Calculations: 86 kg (189 lb 9.5 oz)  Fluid Requirements (mL): 2000 ml or per MD  Estimated Fluid Requirement Method: RDA Method  CHO Requirement: N/A      Nutrition Prescription Ordered    Current Diet Order: regular    Evaluation of Received Nutrient/Fluid Intake    Energy Calories Required: not meeting needs  Protein Required: not meeting needs  Fluid Required: not meeting needs  Tolerance: tolerating     Intake/Output Summary (Last 24 hours) at 2023 1353  Last data filed at 2023 0619  Gross per 24 hour   Intake 480 ml   Output 1650 ml   Net -1170 ml       % Intake of Estimated Energy Needs: 0-50%  % Meal Intake: 0-50%    Nutrition Risk    Level of Risk/Frequency of Follow-up:  (2 x weekly)     Monitor and Evaluation    Food and Nutrient Intake: energy  intake, food and beverage intake  Food and Nutrient Adminstration: diet order  Physical Activity and Function: nutrition-related ADLs and IADLs  Anthropometric Measurements: weight  Biochemical Data, Medical Tests and Procedures: electrolyte and renal panel  Nutrition-Focused Physical Findings: overall appearance, extremities, muscles and bones     Nutrition Follow-Up    RD Follow-up?: Yes

## 2023-04-22 NOTE — NURSING
Upon assessment, noticed X2 bandaids on patients R lateral thigh, removed, appeared to be a skin tear, documented in flowsheet by RN in the ED. Applied Foam dressing. Also noticed skin tear to right groin, applied barrier cream. Documented via LDA and flowsheet. Wound care consult placed.

## 2023-04-22 NOTE — DISCHARGE SUMMARY
"[facility] Medicine  Discharge Summary      Patient Name: Zohaib Wilson Sr.  MRN: [id number]  ABBY: [id number]  Patient Class: IP- Inpatient  Admission Date: 4/21/2023  Hospital Length of Stay: 1 days  Discharge Date and Time:  04/22/2023 10:28 AM  Attending Physician: Nixon Rodrigez MD   Discharging Provider: Nixon Rodrigez MD  Primary Care Provider: Nikki Alfredo MD    Primary Care Team: Networked reference to record PCT     HPI:   Mr. Wilson is a 72-year-old  male, with PMH significant for dementia, hypothyroidism, was sent to the ED from PCP clinic due to high calcium of 15.2.  Worsening renal function, creatinine 3.0 (baseline around 2.2).  Due to dementia, patient is a very poor historian.  States that he wants to go home, but currently not oriented to place or person or time.  Repeat blood work shows calcium 14.0.  Patient known history of CHF, EF 40% with grade 2 diastolic dysfunction.  Hence did not receive aggressive IV fluid boluses in the ED. received NS at 75 cc/hour.  CXR, UA, imaging studies pending at this time.    Admitting diagnosis:  Hypercalcemia.      * No surgery found *      Hospital Course:   S/p calcitonin b.i.d. x2 doses. IVF gentle hydration initiated. Lab abnormalities above rapidly corrected on lab draw 24 hours later. Patient back to baseline mentation. Called daughter on day of discharge, discussed possible NH placement for patient given his need for around the clock care. Daughter adamant against NH placement, says that patient has a live-in roommate who will provide limited support. She also states that she will be visiting every day to take care of the patient. HH orders facilitated by patient's PCP. Will discharge home with HH and plans to repeat labs within the next month.     BP (!) 118/58   Pulse 73   Temp 97.9 °F (36.6 °C)   Resp 18   Ht 6' 2" (1.88 m)   Wt 86 kg (189 lb 9.5 oz)   SpO2 98%   BMI 24.34 kg/m²     PHYS " EXAM  Constitutional:       Appearance: He is ill-appearing.   HENT:      Head: Normocephalic and atraumatic.      Mouth/Throat:      Mouth: Mucous membranes are dry.   Eyes:      Conjunctiva/sclera: Conjunctivae normal.      Pupils: Pupils are equal, round, and reactive to light.   Cardiovascular:      Rate and Rhythm: Normal rate and regular rhythm.   Pulmonary:      Effort: Pulmonary effort is normal. No respiratory distress.   Abdominal:      General: There is no distension.      Tenderness: There is no abdominal tenderness.   Musculoskeletal:         General: No swelling.      Cervical back: No rigidity.   Skin:     Coloration: Skin is not jaundiced.   Neurological:      Mental Status: He is alert. He is disoriented.      Comments: Patient not alert to place, person and time.     Goals of Care Treatment Preferences:         Consults:   Consults (From admission, onward)        Status Ordering Provider     Inpatient consult to Social Work  Once        Provider:  (Not yet assigned)    IVÁN Cody     Inpatient consult to Nephrology  Once        Provider:  Mackey Kamyar, MD    Completed THUMMA, LILIA          No new Assessment & Plan notes have been filed under this hospital service since the last note was generated.  Service: Hospital Medicine    Final Active Diagnoses:    Diagnosis Date Noted POA    PRINCIPAL PROBLEM:  Hypercalcemia [E83.52] 04/21/2023 Yes    Dementia [F03.90] 04/21/2023 Yes    Chronic combined systolic and diastolic congestive heart failure [I50.42] 11/04/2021 Yes    Acute renal failure superimposed on stage 4 chronic kidney disease [N17.9, N18.4] 10/17/2021 Yes    Tobacco user [Z72.0] 09/14/2021 Yes    COPD, group C, by GOLD 2013 classification [J44.9] 09/14/2021 Yes      Problems Resolved During this Admission:       Discharged Condition: good    Disposition: Home-Health Care [facility]    Follow Up:   Follow-up Information     Nikki Alfredo MD. Schedule an appointment as  soon as possible for a visit.    Specialty: Family Medicine  Why: Routine follow up after hospital discharge  Contact information:  [address] [place] [address] [address] Jake SESAY [address] [phone]                       Patient Instructions:   No discharge procedures on file.    Significant Diagnostic Studies:   BMP: Recent Labs   Lab 04/22/23  0532   GLU 85   *   K 3.5      CO2 20*   BUN 37*   CREATININE 2.3*   CALCIUM 9.7   MG 1.8     CBC:   Recent Labs   Lab 04/20/23  1507 04/22/23  0532   WBC 7.28 6.03   HGB 14.1 11.0*   HCT 43.8 32.9*    138*     CMP:   Recent Labs   Lab 04/20/23  1507 04/21/23  0251 04/22/23  0532    142 130*   K 4.5 4.1 3.5    101 108   CO2 27 27 20*   GLU 86 85 85   BUN 42* 48* 37*   CREATININE 3.0* 3.1* 2.3*   CALCIUM 15.2* 14.0* 9.7   PROT 7.5 7.9 6.0   ALBUMIN 3.8 3.5 2.7*   BILITOT 0.4 0.3 0.4   ALKPHOS 118 111 83   AST 21 19 21   ALT 35 33 27   ANIONGAP 10 14 2*     Cardiac Markers: No results for input(s): CKMB, MYOGLOBIN, BNP, TROPISTAT in the last 48 hours.  Coagulation: No results for input(s): PT, INR, APTT in the last 48 hours.  Lactic Acid: No results for input(s): LACTATE in the last 48 hours.  Magnesium:   Recent Labs   Lab 04/21/23  0251 04/22/23  0532   MG 2.4 1.8     Troponin: No results for input(s): TROPONINI, TROPONINIHS in the last 48 hours.  TSH:   Recent Labs   Lab 04/22/23  0532   TSH 2.524     Urine Studies:   Recent Labs   Lab 04/21/23  0541   COLORU Yellow   APPEARANCEUA Clear   PHUR 7.0   SPECGRAV 1.010   PROTEINUA 1+*   GLUCUA Negative   KETONESU Negative   BILIRUBINUA Negative   OCCULTUA Negative   NITRITE Negative   UROBILINOGEN Negative   LEUKOCYTESUR 2+*   RBCUA 0   WBCUA 27*   BACTERIA Few*   HYALINECASTS 0     Imaging Results          X-Ray Chest 1 View (Final result)  Result time 04/21/23 06:29:24    Final result by Zohaib Dumas MD (04/21/23 06:29:24)                 Impression:      No acute process seen.      Electronically  signed by: Zohaib Dumas MD  Date:    04/21/2023  Time:    06:29             Narrative:    EXAMINATION:  XR CHEST 1 VIEW    CLINICAL HISTORY:  Disorientation, unspecified    FINDINGS:  Single view of the chest.  Comparison 02/04/2022    The cardiac and mediastinal silhouettes appear within normal limits.   Diffuse coarsening of the bronchovascular markings and pulmonary interstitium appears similar to multiple prior examinations.  Slightly more prominent left hilar and left infrahilar thickening which was not found to be significant on CT.  No definite focal parenchymal consolidation or pleural effusion demonstrated.  No acute osseous findings demonstrated.                               CT Head Without Contrast (Final result)  Result time 04/21/23 06:31:44    Final result by Zohaib Dumas MD (04/21/23 06:31:44)                 Impression:      Chronic microvascular ischemic changes.    All CT scans at this facility use dose modulation, iterative reconstruction, and/or weight based dosing when appropriate to reduce radiation dose to as low as reasonable achievable.      Electronically signed by: Zohaib Dumas MD  Date:    04/21/2023  Time:    06:31             Narrative:    EXAMINATION:  CT HEAD WITHOUT CONTRAST    CLINICAL HISTORY:  Mental status change, unknown cause;    TECHNIQUE:  Low dose axial CT images obtained throughout the head without intravenous contrast. Sagittal and coronal reconstructions were performed.    COMPARISON:  None.    FINDINGS:  Intracranial compartment:    The brain parenchyma demonstrates areas of decreased attenuation with moderate periventricular white matter consistent with chronic microvascular ischemic changes..  No parenchymal mass, hemorrhage, edema or major vascular distribution infarct.  Vascular calcifications are noted.    Moderate prominence of the sulci and ventricles are consistent with age-related involutional changes.    No extra-axial blood or fluid  collections.    Skull/extracranial contents (limited evaluation): Mild ethmoid mucosal thickening.  No fracture. Bilateral mastoid effusions.  Cannot exclude chronic mastoiditis.  Paranasal sinuses are essentially clear.                               CT Chest Abdomen Pelvis Without Contrast (XPD) (Final result)  Result time 04/21/23 06:42:25    Final result by Zohaib Dumas MD (04/21/23 06:42:25)                 Impression:      Evaluation of solid organ and vascular pathology is limited due to lack of IV contrast.    Moderate constipation including fecal impaction of the rectum.    Abnormal enlarged paratracheal lymph node measuring 3.2 x 3.1 cm. Recommend pulmonary follow-up    Porcelain gallbladder.    See additional findings and recommendations above.    All CT scans at this facility use dose modulation, iterative reconstruction and/or weight based dosing when appropriate to reduce radiation dose to as low as reasonably achievable.      Electronically signed by: Zohaib Dumas MD  Date:    04/21/2023  Time:    06:42             Narrative:    EXAMINATION:  CT CHEST ABDOMEN PELVIS WITHOUT CONTRAST(XPD)    CLINICAL HISTORY:  Weight loss, unintended;    TECHNIQUE:  The patient was surveyed from the thoracic inlet through the pelvis without oral and IV contrast.  Data was reconstructed for coronal, sagittal, and axial images.    COMPARISON:  01/14/2021    FINDINGS:  The soft tissues at the base of the neck are unremarkable.  The thyroid gland is normal in size and configuration.  There is no abnormal lymph node enlargement.    Abnormal enlarged paratracheal lymph node measuring 3.2 x 3.1 cm.  Recommend pulmonary follow-up .  the hilar contours are unremarkable.  The esophagus is normal in course and contour.  Mild gynecomastia.    The thoracic aorta is normal in course, caliber, and contour without significant atherosclerotic calcifications.The heart does not appear enlarged and there is no pericardial effusion.   Moderate to severe coronary artery disease.    The trachea and proximal airways are patent.    Diffuse chronic interstitial lung disease.  Question atelectasis or small infiltrate within the right upper lobe.  Motion limited.  Significant emphysematous changes are seen throughout the upper lobes.    The liver is normal in size and attenuation.  Several indeterminate hypodense lesions are seen within the liver measuring up to 14 mm not fully characterized on noncontrast imaging..  Moderate gallbladder distension with calcification which could reflect porcelain gallbladder there is no intra-or extrahepatic biliary ductal dilatation.    The stomach, spleen, pancreas, and adrenal glands are unremarkable.    Bilateral renal cortical thinning.  Mild nonspecific perinephric stranding.  Punctate nonobstructing stones bilaterally.  Vascular calcifications are also noted.  13 mm hypodense lesion within the upper pole right kidney not fully characterized on noncontrast imaging but likely reflects a cyst.  Additional indeterminate lesions seen within the midpole right kidney measuring 2.3 cm.  Consider follow-up ultrasound.  There is no evidence of hydronephrosis.  The ureters appear normal in course and caliber without evidence of ureteral dilatation. Mild nonspecific bladder wall thickening which can be seen with cystitis.    The abdominal aorta is normal in course and caliber with severe atherosclerotic calcifications.  Small bilateral fat containing inguinal hernias.    The visualized loops of small bowel show no evidence of obstruction or inflammation. Large bowel is unremarkable.  Moderate constipation.  There is no ascites, free fluid, or intraperitoneal free air.    Shotty retroperitoneal nodes.    Diffuse osteopenia.  Moderate degenerative changes throughout the spine greatest within the lower lumbar spine..  Disc space narrowing and posterior disc osteophyte complex and facet arthropathy noted at L5/S1 producing  moderate neural foraminal narrowing bilaterally.  Suspect degenerative pars defects at L4.    Superior endplate wedge deformities at L1 and L2.  Mild sclerotic changes within these bones.  MRI or bone scan can be obtained as clinically warranted.                                  Pending Diagnostic Studies:     Procedure Component Value Units Date/Time    Immunofixation, Urine  Random [[id number]] Collected: 04/21/23 0542    Order Status: Sent Lab Status: In process Updated: 04/21/23 1012    Specimen: Urine, Catheterized     Immunoglobulin TLC, Urine [[id number]] Collected: 04/21/23 0541    Order Status: Sent Lab Status: In process Updated: 04/21/23 1012    Specimen: Urine     PTH-related peptide [[id number]] Collected: 04/21/23 0555    Order Status: Sent Lab Status: In process Updated: 04/21/23 1017    Specimen: Blood          Medications:  Reconciled Home Medications:      Medication List      CONTINUE taking these medications    albuterol 1.25 mg/3 mL Nebu  Commonly known as: ACCUNEB  Take 3 mLs (1.25 mg total) by nebulization 2 (two) times a day. Rescue     aspirin 81 MG EC tablet  Commonly known as: ECOTRIN  Take 1 tablet (81 mg total) by mouth once daily.     atorvastatin 40 MG tablet  Commonly known as: LIPITOR  Take 1 tablet (40 mg total) by mouth once daily.     docusate sodium 100 MG capsule  Commonly known as: COLACE  Take 1 capsule (100 mg total) by mouth 2 (two) times daily.     ENTRESTO 49-51 mg per tablet  Generic drug: sacubitriL-valsartan  Take 1 tablet by mouth 2 (two) times daily.     levothyroxine 25 MCG tablet  Commonly known as: SYNTHROID  Take 25 mcg by mouth before breakfast.     metoprolol succinate 25 MG 24 hr tablet  Commonly known as: TOPROL-XL  TAKE 1 TABLET(25 MG) BY MOUTH EVERY DAY     potassium chloride 20 mEq  Commonly known as: K-TAB  Take 20 mEq by mouth.     prednisoLONE acetate 1 % Drps  Commonly known as: PRED FORTE  Place 1 drop into the left eye 4 (four) times daily.      SITagliptin phosphate 25 MG Tab  Commonly known as: JANUVIA  Take 25 mg by mouth.     tamsulosin 0.4 mg Cap  Commonly known as: FLOMAX  Take 0.4 mg by mouth.     TRELEGY ELLIPTA 100-62.5-25 mcg Dsdv  Generic drug: fluticasone-umeclidin-vilanter  Inhale 1 puff into the lungs once daily.            Indwelling Lines/Drains at time of discharge:   Lines/Drains/Airways     None                 Time spent on the discharge of patient: 25 minutes  of time spent on discharge including examining patient, providing discharge instructions, arranging follow-up and documentation.           Nixon Rodrigez MD  Department of Hospital Medicine  [facility] Med [facility]

## 2023-04-22 NOTE — PLAN OF CARE
OT eval completed per MD order. Acute OT services are recommended. D/C REC SNF, however it appears from case management notes, pt and family prefers pt to go home with HH.

## 2023-04-22 NOTE — CONSULTS
"Consult noted.  Per discussion with daughter yesterday, patient lives with a friend who offers limited assistance.  Additionally, per daughter, patient refuses to move in with her.  CM introduced topic of nursing home, daughter refused,  stating "I'd don't want to put him in a nursing home, and I can go over there daily to help take care of him.  I used to take care of him but I had to stop for a year because I was very sick and had to have surgery but I'm recovered now."  "

## 2023-04-23 LAB
BACTERIA UR CULT: NORMAL
BACTERIA UR CULT: NORMAL

## 2023-04-24 LAB
INTERPRETATION UR IFE-IMP: NORMAL
KAPPA TOTAL LIGHT CHAIN, U: 6.63 MG/DL
KAPPA/LAMBDA TLC RATIO,U: 1.81 (ref 0.7–6.2)
LAMBDA TOTAL LIGHT CHAIN,U: 3.67 MG/DL
PATHOLOGIST INTERPRETATION UIFE: NORMAL

## 2023-04-25 ENCOUNTER — TELEPHONE (OUTPATIENT)
Dept: INTERNAL MEDICINE | Facility: CLINIC | Age: 73
End: 2023-04-25
Payer: MEDICARE

## 2023-04-25 NOTE — TELEPHONE ENCOUNTER
Patients daughter wants to know if he still needs to be on the synthroid since his calcium levels are bacvk.

## 2023-04-25 NOTE — TELEPHONE ENCOUNTER
----- Message from Corry Hobson sent at 4/25/2023  9:19 AM CDT -----  Contact: faith/ [facility]  Faith with [facility] is calling to speak with the nurse regarding orders. Reports needing the dr to sent in home health orders to get it started. Fax # [phone]. Please give faith a call back at [phone]  Thanks latesha

## 2023-04-25 NOTE — TELEPHONE ENCOUNTER
"Your fax has been successfully sent to [phone] at [phone].  ------------------------------------------------------------  From: [id number]  ------------------------------------------------------------  4/25/2023 10:49:43 AM Transmission Record   Sent to [phone] with remote ID "[email or web address]"   Result: (0/339;0/0) Success   Page record: 1 - 8   Elapsed time: 02:44 on channel 41      HH orders faxed to [facility]  "

## 2023-04-26 LAB — PTH RELATED PROT SERPL-SCNC: 1.1 PMOL/L

## 2023-04-26 PROCEDURE — G0180 MD CERTIFICATION HHA PATIENT: HCPCS | Mod: ,,, | Performed by: FAMILY MEDICINE

## 2023-04-26 PROCEDURE — G0180 PR HOME HEALTH MD CERTIFICATION: ICD-10-PCS | Mod: ,,, | Performed by: FAMILY MEDICINE

## 2023-04-26 RX ORDER — LEVOTHYROXINE SODIUM 25 UG/1
25 TABLET ORAL
Status: CANCELLED | OUTPATIENT
Start: 2023-04-26

## 2023-04-26 NOTE — TELEPHONE ENCOUNTER
Repeat TSH in hospital was in normal range, he should continue levothyroxine if he has been taking it.

## 2023-04-26 NOTE — TELEPHONE ENCOUNTER
Called patient's daughter and let her know that TSH was normal in the hospital. Continue synthroid as needed. Refill needed to [facility] in [place]

## 2023-04-26 NOTE — TELEPHONE ENCOUNTER
No new care gaps identified.  [facility] Embedded Care Gaps. Reference number: [id number]. 4/26/2023   11:06:28 AM BERTAT

## 2023-04-28 NOTE — TELEPHONE ENCOUNTER
nurse, Arcelia, is asking about meds that patient was discharged with but has no order for. They want to know if he can get a refill on the following or does he need to stop.     Synthroid 25mcg  Potassium 20meq  Flomax 0.4mg    Also, he has a discharge order for Januvia which they did not have before. He is not a diabetic. They want to know if he needs to continue that.     LOV 4-

## 2023-04-28 NOTE — TELEPHONE ENCOUNTER
No care due was identified.  [facility] Embedded Care Due Messages. Reference number: [id number].   4/28/2023 3:33:09 PM CDT

## 2023-04-28 NOTE — TELEPHONE ENCOUNTER
----- Message from Carol Couch sent at 4/28/2023 10:03 AM CDT -----  Pts home health nurse is requesting a call back concerning some medications for the pt. Call back at [phone] thx jm

## 2023-05-02 RX ORDER — LEVOTHYROXINE SODIUM 25 UG/1
25 TABLET ORAL
Qty: 30 TABLET | Refills: 0 | Status: SHIPPED | OUTPATIENT
Start: 2023-05-02 | End: 2023-06-01

## 2023-05-02 RX ORDER — LEVOTHYROXINE SODIUM 25 UG/1
25 TABLET ORAL
Qty: 90 TABLET | Refills: 0 | OUTPATIENT
Start: 2023-05-02 | End: 2023-07-31

## 2023-05-02 RX ORDER — TAMSULOSIN HYDROCHLORIDE 0.4 MG/1
0.4 CAPSULE ORAL DAILY
Qty: 90 CAPSULE | Refills: 0 | OUTPATIENT
Start: 2023-05-02 | End: 2023-07-31

## 2023-05-02 RX ORDER — TAMSULOSIN HYDROCHLORIDE 0.4 MG/1
0.4 CAPSULE ORAL DAILY
Qty: 30 CAPSULE | Refills: 0 | Status: SHIPPED | OUTPATIENT
Start: 2023-05-02 | End: 2023-08-25

## 2023-05-02 RX ORDER — POTASSIUM CHLORIDE 1500 MG/1
20 TABLET, EXTENDED RELEASE ORAL DAILY
Qty: 30 TABLET | Refills: 0 | Status: SHIPPED | OUTPATIENT
Start: 2023-05-02 | End: 2023-06-01

## 2023-05-02 NOTE — TELEPHONE ENCOUNTER
LOV - 04/20/23 with [facility]  NOV - 05/24/23 with [facility]    Patient is requesting 90 day supply to [email or web address].

## 2023-05-02 NOTE — TELEPHONE ENCOUNTER
Requested refills sent, they are listed on discharge summary to continue.    Sagaruvia is also listed as a prior medication, if he has not been taking it we can discuss at follow up. I don't see that a1c was done during hospital stay.

## 2023-05-02 NOTE — TELEPHONE ENCOUNTER
No care due was identified.  [facility] Embedded Care Due Messages. Reference number: [id number].   5/02/2023 12:26:03 PM CDT

## 2023-05-02 NOTE — TELEPHONE ENCOUNTER
Refill Decision Note   Zohaib Wilson  is requesting a refill authorization.  Brief Assessment and Rationale for Refill:  Quick Discontinue     Medication Therapy Plan:  Receipt confirmed by pharmacy (5/2/2023  7:40 AM CDT)    Medication Reconciliation Completed: No   Comments:     No Care Gaps recommended.     Note composed:12:42 PM 05/02/2023

## 2023-05-12 ENCOUNTER — TELEPHONE (OUTPATIENT)
Dept: INTERNAL MEDICINE | Facility: CLINIC | Age: 73
End: 2023-05-12
Payer: MEDICARE

## 2023-05-12 NOTE — TELEPHONE ENCOUNTER
----- Message from Nicole Ocasio sent at 5/12/2023 12:37 PM CDT -----  Contact: Fidelia/Daughter  Pt daughter is calling in regards to a handicap form being filled out. Please call back at [phone].        Thanks   KT

## 2023-05-24 ENCOUNTER — LAB VISIT (OUTPATIENT)
Dept: LAB | Facility: HOSPITAL | Age: 73
End: 2023-05-24
Attending: FAMILY MEDICINE
Payer: MEDICARE

## 2023-05-24 ENCOUNTER — OFFICE VISIT (OUTPATIENT)
Dept: INTERNAL MEDICINE | Facility: CLINIC | Age: 73
End: 2023-05-24
Payer: MEDICARE

## 2023-05-24 VITALS
SYSTOLIC BLOOD PRESSURE: 128 MMHG | WEIGHT: 181.75 LBS | HEART RATE: 99 BPM | HEIGHT: 72 IN | OXYGEN SATURATION: 96 % | RESPIRATION RATE: 16 BRPM | DIASTOLIC BLOOD PRESSURE: 68 MMHG | TEMPERATURE: 98 F | BODY MASS INDEX: 24.62 KG/M2

## 2023-05-24 DIAGNOSIS — E03.9 HYPOTHYROIDISM, UNSPECIFIED TYPE: ICD-10-CM

## 2023-05-24 DIAGNOSIS — E83.52 HYPERCALCEMIA: ICD-10-CM

## 2023-05-24 DIAGNOSIS — D64.9 ANEMIA, UNSPECIFIED TYPE: ICD-10-CM

## 2023-05-24 DIAGNOSIS — F03.90 DEMENTIA, UNSPECIFIED DEMENTIA SEVERITY, UNSPECIFIED DEMENTIA TYPE, UNSPECIFIED WHETHER BEHAVIORAL, PSYCHOTIC, OR MOOD DISTURBANCE OR ANXIETY: ICD-10-CM

## 2023-05-24 DIAGNOSIS — I10 PRIMARY HYPERTENSION: ICD-10-CM

## 2023-05-24 DIAGNOSIS — E83.52 HYPERCALCEMIA: Primary | ICD-10-CM

## 2023-05-24 PROCEDURE — 1160F PR REVIEW ALL MEDS BY PRESCRIBER/CLIN PHARMACIST DOCUMENTED: ICD-10-PCS | Mod: CPTII,S$GLB,, | Performed by: FAMILY MEDICINE

## 2023-05-24 PROCEDURE — 1160F RVW MEDS BY RX/DR IN RCRD: CPT | Mod: CPTII,S$GLB,, | Performed by: FAMILY MEDICINE

## 2023-05-24 PROCEDURE — 3008F PR BODY MASS INDEX (BMI) DOCUMENTED: ICD-10-PCS | Mod: CPTII,S$GLB,, | Performed by: FAMILY MEDICINE

## 2023-05-24 PROCEDURE — 3078F PR MOST RECENT DIASTOLIC BLOOD PRESSURE < 80 MM HG: ICD-10-PCS | Mod: CPTII,S$GLB,, | Performed by: FAMILY MEDICINE

## 2023-05-24 PROCEDURE — 84443 ASSAY THYROID STIM HORMONE: CPT | Performed by: FAMILY MEDICINE

## 2023-05-24 PROCEDURE — 36415 COLL VENOUS BLD VENIPUNCTURE: CPT | Performed by: FAMILY MEDICINE

## 2023-05-24 PROCEDURE — 99499 UNLISTED E&M SERVICE: CPT | Mod: S$GLB,,, | Performed by: FAMILY MEDICINE

## 2023-05-24 PROCEDURE — 99999 PR PBB SHADOW E&M-EST. PATIENT-LVL V: ICD-10-PCS | Mod: PBBFAC,,, | Performed by: FAMILY MEDICINE

## 2023-05-24 PROCEDURE — 99999 PR PBB SHADOW E&M-EST. PATIENT-LVL V: CPT | Mod: PBBFAC,,, | Performed by: FAMILY MEDICINE

## 2023-05-24 PROCEDURE — 84439 ASSAY OF FREE THYROXINE: CPT | Performed by: FAMILY MEDICINE

## 2023-05-24 PROCEDURE — 1159F MED LIST DOCD IN RCRD: CPT | Mod: CPTII,S$GLB,, | Performed by: FAMILY MEDICINE

## 2023-05-24 PROCEDURE — 1159F PR MEDICATION LIST DOCUMENTED IN MEDICAL RECORD: ICD-10-PCS | Mod: CPTII,S$GLB,, | Performed by: FAMILY MEDICINE

## 2023-05-24 PROCEDURE — 1126F PR PAIN SEVERITY QUANTIFIED, NO PAIN PRESENT: ICD-10-PCS | Mod: CPTII,S$GLB,, | Performed by: FAMILY MEDICINE

## 2023-05-24 PROCEDURE — 3074F PR MOST RECENT SYSTOLIC BLOOD PRESSURE < 130 MM HG: ICD-10-PCS | Mod: CPTII,S$GLB,, | Performed by: FAMILY MEDICINE

## 2023-05-24 PROCEDURE — 3288F FALL RISK ASSESSMENT DOCD: CPT | Mod: CPTII,S$GLB,, | Performed by: FAMILY MEDICINE

## 2023-05-24 PROCEDURE — 3074F SYST BP LT 130 MM HG: CPT | Mod: CPTII,S$GLB,, | Performed by: FAMILY MEDICINE

## 2023-05-24 PROCEDURE — 85025 COMPLETE CBC W/AUTO DIFF WBC: CPT | Performed by: FAMILY MEDICINE

## 2023-05-24 PROCEDURE — 1126F AMNT PAIN NOTED NONE PRSNT: CPT | Mod: CPTII,S$GLB,, | Performed by: FAMILY MEDICINE

## 2023-05-24 PROCEDURE — 80053 COMPREHEN METABOLIC PANEL: CPT | Performed by: FAMILY MEDICINE

## 2023-05-24 PROCEDURE — 99214 OFFICE O/P EST MOD 30 MIN: CPT | Mod: S$GLB,,, | Performed by: FAMILY MEDICINE

## 2023-05-24 PROCEDURE — 3008F BODY MASS INDEX DOCD: CPT | Mod: CPTII,S$GLB,, | Performed by: FAMILY MEDICINE

## 2023-05-24 PROCEDURE — 4010F PR ACE/ARB THEARPY RXD/TAKEN: ICD-10-PCS | Mod: CPTII,S$GLB,, | Performed by: FAMILY MEDICINE

## 2023-05-24 PROCEDURE — 3078F DIAST BP <80 MM HG: CPT | Mod: CPTII,S$GLB,, | Performed by: FAMILY MEDICINE

## 2023-05-24 PROCEDURE — 3288F PR FALLS RISK ASSESSMENT DOCUMENTED: ICD-10-PCS | Mod: CPTII,S$GLB,, | Performed by: FAMILY MEDICINE

## 2023-05-24 PROCEDURE — 1101F PR PT FALLS ASSESS DOC 0-1 FALLS W/OUT INJ PAST YR: ICD-10-PCS | Mod: CPTII,S$GLB,, | Performed by: FAMILY MEDICINE

## 2023-05-24 PROCEDURE — 4010F ACE/ARB THERAPY RXD/TAKEN: CPT | Mod: CPTII,S$GLB,, | Performed by: FAMILY MEDICINE

## 2023-05-24 PROCEDURE — 99214 PR OFFICE/OUTPT VISIT, EST, LEVL IV, 30-39 MIN: ICD-10-PCS | Mod: S$GLB,,, | Performed by: FAMILY MEDICINE

## 2023-05-24 PROCEDURE — 1101F PT FALLS ASSESS-DOCD LE1/YR: CPT | Mod: CPTII,S$GLB,, | Performed by: FAMILY MEDICINE

## 2023-05-24 NOTE — PATIENT INSTRUCTIONS
"Check with your pharmacist regarding Tdap (tetanus/diphtheria/pertussis) vaccine.     Check with your pharmacist regarding shingrix vaccine.     The bivalent covid booster is now available. You can schedule an appointment for the vaccine through [facility] or ask  to assist you with scheduling an appointment for the vaccine.    The bivalent vaccines, known as the "Omicron" vaccines, target both the original strain of COVID-19 as well as the Omicron variant, which is now the predominant strain in the [place].    The Omicron booster is authorized for individuals 12 years and older and is given two months after last dose of primary or booster shot.   "

## 2023-05-24 NOTE — PROGRESS NOTES
Subjective:       Patient ID: Zohaib Wilson Sr. is a 72 y.o. male.    Chief Complaint: Hospital Follow Up    Transitional Care Note    Family and/or Caretaker present at visit?  Yes. Emiliano Lutz, brother-in-law of patient's daughter  Diagnostic tests reviewed/disposition: I have reviewed all completed as well as pending diagnostic tests at the time of discharge.  Disease/illness education: see A/P  Home health/community services discussion/referrals: Patient has home health established at [facility].   Establishment or re-establishment of referral orders for community resources: No other necessary community resources.   Discussion with other health care providers: No discussion with other health care providers necessary.     Patient presents to clinic today for hospital TCC visit. Patient was recently hospitalized for hypercalcemia.     Wt Readings from Last 10 Encounters:  05/24/23 : 82.5 kg (181 lb 12.3 oz) - today's visit  04/22/23 : 86 kg (189 lb 9.5 oz)  04/20/23 : 74.2 kg (163 lb 9.3 oz) - last visit  07/20/22 : 99.5 kg (219 lb 4 oz)  07/05/22 : 98 kg (216 lb 0.8 oz)  06/22/22 : 97.9 kg (215 lb 13.3 oz)  06/10/22 : 98.3 kg (216 lb 11.4 oz)  02/04/22 : 102.3 kg (225 lb 6.7 oz)  01/13/22 : 101.8 kg (224 lb 6.9 oz)  01/07/22 : 101.8 kg (224 lb 6.9 oz)        Review of Systems   Constitutional:  Negative for chills, fatigue, fever and unexpected weight change.   Eyes:  Negative for visual disturbance.   Respiratory:  Negative for shortness of breath.    Cardiovascular:  Negative for chest pain.   Musculoskeletal:  Negative for myalgias.   Neurological:  Negative for headaches.       Objective:      Physical Exam  Vitals reviewed.   Constitutional:       General: He is not in acute distress.     Appearance: He is well-developed.   HENT:      Head: Normocephalic and atraumatic.   Eyes:      General: Lids are normal. No scleral icterus.     Extraocular Movements: Extraocular movements intact.       Conjunctiva/sclera: Conjunctivae normal.      Pupils: Pupils are equal, round, and reactive to light.   Pulmonary:      Effort: Pulmonary effort is normal.   Neurological:      Mental Status: He is alert. Mental status is at baseline.      Gait: Gait normal.   Psychiatric:         Mood and Affect: Mood and affect normal.       Assessment:       1. Hypercalcemia    2. Dementia, unspecified dementia severity, unspecified dementia type, unspecified whether behavioral, psychotic, or mood disturbance or anxiety    3. Primary hypertension    4. Hypothyroidism, unspecified type    5. Anemia, unspecified type        Plan:   1. Hypercalcemia  -     Comprehensive Metabolic Panel; Future; Expected date: 05/24/2023    2. Dementia, unspecified dementia severity, unspecified dementia type, unspecified whether behavioral, psychotic, or mood disturbance or anxiety  -     Ambulatory referral/consult to Neurology; Future; Expected date: 05/31/2023    3. Primary hypertension    4. Hypothyroidism, unspecified type  -     TSH; Future; Expected date: 05/24/2023  -     T4, Free; Future; Expected date: 05/24/2023    5. Anemia, unspecified type  -     CBC Auto Differential; Future; Expected date: 05/24/2023      Handicap tag paperwork completed, indication 2.

## 2023-05-25 LAB
ALBUMIN SERPL BCP-MCNC: 3.6 G/DL (ref 3.5–5.2)
ALP SERPL-CCNC: 102 U/L (ref 55–135)
ALT SERPL W/O P-5'-P-CCNC: 17 U/L (ref 10–44)
ANION GAP SERPL CALC-SCNC: 11 MMOL/L (ref 8–16)
AST SERPL-CCNC: 20 U/L (ref 10–40)
BASOPHILS # BLD AUTO: 0.05 K/UL (ref 0–0.2)
BASOPHILS NFR BLD: 0.7 % (ref 0–1.9)
BILIRUB SERPL-MCNC: 0.3 MG/DL (ref 0.1–1)
BUN SERPL-MCNC: 26 MG/DL (ref 8–23)
CALCIUM SERPL-MCNC: 11.4 MG/DL (ref 8.7–10.5)
CHLORIDE SERPL-SCNC: 105 MMOL/L (ref 95–110)
CO2 SERPL-SCNC: 21 MMOL/L (ref 23–29)
CREAT SERPL-MCNC: 1.6 MG/DL (ref 0.5–1.4)
DIFFERENTIAL METHOD: ABNORMAL
EOSINOPHIL # BLD AUTO: 0.1 K/UL (ref 0–0.5)
EOSINOPHIL NFR BLD: 0.9 % (ref 0–8)
ERYTHROCYTE [DISTWIDTH] IN BLOOD BY AUTOMATED COUNT: 15.7 % (ref 11.5–14.5)
EST. GFR  (NO RACE VARIABLE): 45.5 ML/MIN/1.73 M^2
GLUCOSE SERPL-MCNC: 81 MG/DL (ref 70–110)
HCT VFR BLD AUTO: 40.4 % (ref 40–54)
HGB BLD-MCNC: 13 G/DL (ref 14–18)
IMM GRANULOCYTES # BLD AUTO: 0.05 K/UL (ref 0–0.04)
IMM GRANULOCYTES NFR BLD AUTO: 0.7 % (ref 0–0.5)
LYMPHOCYTES # BLD AUTO: 1.5 K/UL (ref 1–4.8)
LYMPHOCYTES NFR BLD: 19.5 % (ref 18–48)
MCH RBC QN AUTO: 31.4 PG (ref 27–31)
MCHC RBC AUTO-ENTMCNC: 32.2 G/DL (ref 32–36)
MCV RBC AUTO: 98 FL (ref 82–98)
MONOCYTES # BLD AUTO: 0.8 K/UL (ref 0.3–1)
MONOCYTES NFR BLD: 10.8 % (ref 4–15)
NEUTROPHILS # BLD AUTO: 5.2 K/UL (ref 1.8–7.7)
NEUTROPHILS NFR BLD: 67.4 % (ref 38–73)
NRBC BLD-RTO: 0 /100 WBC
PLATELET # BLD AUTO: 191 K/UL (ref 150–450)
PMV BLD AUTO: 11.1 FL (ref 9.2–12.9)
POTASSIUM SERPL-SCNC: 4.2 MMOL/L (ref 3.5–5.1)
PROT SERPL-MCNC: 7.8 G/DL (ref 6–8.4)
RBC # BLD AUTO: 4.14 M/UL (ref 4.6–6.2)
SODIUM SERPL-SCNC: 137 MMOL/L (ref 136–145)
T4 FREE SERPL-MCNC: 1.05 NG/DL (ref 0.71–1.51)
TSH SERPL DL<=0.005 MIU/L-ACNC: 3.82 UIU/ML (ref 0.4–4)
WBC # BLD AUTO: 7.66 K/UL (ref 3.9–12.7)

## 2023-06-02 ENCOUNTER — LAB VISIT (OUTPATIENT)
Dept: LAB | Facility: HOSPITAL | Age: 73
End: 2023-06-02
Attending: FAMILY MEDICINE
Payer: MEDICARE

## 2023-06-02 DIAGNOSIS — E83.52 HYPERCALCEMIA: Primary | ICD-10-CM

## 2023-06-02 LAB
ANION GAP SERPL CALC-SCNC: 12 MMOL/L (ref 8–16)
BUN SERPL-MCNC: 21 MG/DL (ref 8–23)
CALCIUM SERPL-MCNC: 9.3 MG/DL (ref 8.7–10.5)
CHLORIDE SERPL-SCNC: 107 MMOL/L (ref 95–110)
CO2 SERPL-SCNC: 20 MMOL/L (ref 23–29)
CREAT SERPL-MCNC: 1.6 MG/DL (ref 0.5–1.4)
EST. GFR  (NO RACE VARIABLE): 45 ML/MIN/1.73 M^2
GLUCOSE SERPL-MCNC: 79 MG/DL (ref 70–110)
POTASSIUM SERPL-SCNC: 4.7 MMOL/L (ref 3.5–5.1)
SODIUM SERPL-SCNC: 139 MMOL/L (ref 136–145)

## 2023-06-02 PROCEDURE — 80048 BASIC METABOLIC PNL TOTAL CA: CPT | Performed by: FAMILY MEDICINE

## 2023-06-02 PROCEDURE — 36415 COLL VENOUS BLD VENIPUNCTURE: CPT | Performed by: FAMILY MEDICINE

## 2023-06-13 ENCOUNTER — EXTERNAL HOME HEALTH (OUTPATIENT)
Dept: HOME HEALTH SERVICES | Facility: HOSPITAL | Age: 73
End: 2023-06-13
Payer: MEDICARE

## 2023-06-19 RX ORDER — METOPROLOL SUCCINATE 25 MG/1
TABLET, EXTENDED RELEASE ORAL
Qty: 30 TABLET | Refills: 5 | Status: SHIPPED | OUTPATIENT
Start: 2023-06-19 | End: 2023-06-19

## 2023-06-19 RX ORDER — METOPROLOL SUCCINATE 25 MG/1
TABLET, EXTENDED RELEASE ORAL
Qty: 30 TABLET | Refills: 5 | Status: SHIPPED | OUTPATIENT
Start: 2023-06-19

## 2023-06-22 ENCOUNTER — DOCUMENT SCAN (OUTPATIENT)
Dept: HOME HEALTH SERVICES | Facility: HOSPITAL | Age: 73
End: 2023-06-22
Payer: MEDICARE

## 2023-07-02 RX ORDER — SACUBITRIL AND VALSARTAN 49; 51 MG/1; MG/1
TABLET, FILM COATED ORAL
Qty: 60 TABLET | Refills: 5 | Status: SHIPPED | OUTPATIENT
Start: 2023-07-02

## 2023-07-24 PROBLEM — N18.4 ACUTE RENAL FAILURE SUPERIMPOSED ON STAGE 4 CHRONIC KIDNEY DISEASE: Status: RESOLVED | Noted: 2021-10-17 | Resolved: 2023-07-24

## 2023-07-24 PROBLEM — N17.9 ACUTE RENAL FAILURE SUPERIMPOSED ON STAGE 4 CHRONIC KIDNEY DISEASE: Status: RESOLVED | Noted: 2021-10-17 | Resolved: 2023-07-24

## 2023-07-26 ENCOUNTER — HOSPITAL ENCOUNTER (INPATIENT)
Facility: HOSPITAL | Age: 73
LOS: 2 days | Discharge: HOSPICE | DRG: 871 | End: 2023-07-28
Attending: EMERGENCY MEDICINE | Admitting: HOSPITALIST
Payer: MEDICARE

## 2023-07-26 DIAGNOSIS — I50.42 CHRONIC COMBINED SYSTOLIC AND DIASTOLIC CONGESTIVE HEART FAILURE: Primary | ICD-10-CM

## 2023-07-26 DIAGNOSIS — A41.9 SEPTIC SHOCK: ICD-10-CM

## 2023-07-26 DIAGNOSIS — R65.21 SEPTIC SHOCK: ICD-10-CM

## 2023-07-26 DIAGNOSIS — F03.90 DEMENTIA, UNSPECIFIED DEMENTIA SEVERITY, UNSPECIFIED DEMENTIA TYPE, UNSPECIFIED WHETHER BEHAVIORAL, PSYCHOTIC, OR MOOD DISTURBANCE OR ANXIETY: ICD-10-CM

## 2023-07-26 DIAGNOSIS — J44.9 COPD, GROUP C, BY GOLD 2013 CLASSIFICATION: ICD-10-CM

## 2023-07-26 DIAGNOSIS — R53.1 WEAKNESS: ICD-10-CM

## 2023-07-26 LAB
ALBUMIN SERPL BCP-MCNC: 2.9 G/DL (ref 3.5–5.2)
ALLENS TEST: ABNORMAL
ALP SERPL-CCNC: 134 U/L (ref 55–135)
ALT SERPL W/O P-5'-P-CCNC: 27 U/L (ref 10–44)
ANION GAP SERPL CALC-SCNC: 14 MMOL/L (ref 8–16)
AST SERPL-CCNC: 42 U/L (ref 10–40)
BACTERIA #/AREA URNS HPF: ABNORMAL /HPF
BASOPHILS # BLD AUTO: 0.04 K/UL (ref 0–0.2)
BASOPHILS NFR BLD: 0.3 % (ref 0–1.9)
BILIRUB SERPL-MCNC: 0.5 MG/DL (ref 0.1–1)
BILIRUB UR QL STRIP: NEGATIVE
BNP SERPL-MCNC: 85 PG/ML (ref 0–99)
BUN SERPL-MCNC: 83 MG/DL (ref 8–23)
CALCIUM SERPL-MCNC: 12.6 MG/DL (ref 8.7–10.5)
CHLORIDE SERPL-SCNC: 107 MMOL/L (ref 95–110)
CK SERPL-CCNC: 298 U/L (ref 20–200)
CLARITY UR: CLEAR
CO2 SERPL-SCNC: 17 MMOL/L (ref 23–29)
COLOR UR: YELLOW
CREAT SERPL-MCNC: 3.8 MG/DL (ref 0.5–1.4)
DELSYS: ABNORMAL
DIFFERENTIAL METHOD: ABNORMAL
EOSINOPHIL # BLD AUTO: 0 K/UL (ref 0–0.5)
EOSINOPHIL NFR BLD: 0.1 % (ref 0–8)
ERYTHROCYTE [DISTWIDTH] IN BLOOD BY AUTOMATED COUNT: 14.6 % (ref 11.5–14.5)
EST. GFR  (NO RACE VARIABLE): 16 ML/MIN/1.73 M^2
FLOW: 2.5
GLUCOSE SERPL-MCNC: 101 MG/DL (ref 70–110)
GLUCOSE UR QL STRIP: ABNORMAL
HCO3 UR-SCNC: 15.5 MMOL/L (ref 24–28)
HCT VFR BLD AUTO: 46.3 % (ref 40–54)
HCV AB SERPL QL IA: NEGATIVE
HGB BLD-MCNC: 15.3 G/DL (ref 14–18)
HGB UR QL STRIP: ABNORMAL
HIV 1+2 AB+HIV1 P24 AG SERPL QL IA: NEGATIVE
HYALINE CASTS #/AREA URNS LPF: 6 /LPF
IMM GRANULOCYTES # BLD AUTO: 0.33 K/UL (ref 0–0.04)
IMM GRANULOCYTES NFR BLD AUTO: 2.4 % (ref 0–0.5)
KETONES UR QL STRIP: NEGATIVE
LACTATE SERPL-SCNC: 3 MMOL/L (ref 0.5–2.2)
LEUKOCYTE ESTERASE UR QL STRIP: ABNORMAL
LYMPHOCYTES # BLD AUTO: 1.5 K/UL (ref 1–4.8)
LYMPHOCYTES NFR BLD: 10.4 % (ref 18–48)
MAGNESIUM SERPL-MCNC: <0.7 MG/DL (ref 1.6–2.6)
MCH RBC QN AUTO: 29.9 PG (ref 27–31)
MCHC RBC AUTO-ENTMCNC: 33 G/DL (ref 32–36)
MCV RBC AUTO: 90 FL (ref 82–98)
MICROSCOPIC COMMENT: ABNORMAL
MODE: ABNORMAL
MONOCYTES # BLD AUTO: 1.2 K/UL (ref 0.3–1)
MONOCYTES NFR BLD: 8.3 % (ref 4–15)
NEUTROPHILS # BLD AUTO: 10.9 K/UL (ref 1.8–7.7)
NEUTROPHILS NFR BLD: 78.5 % (ref 38–73)
NITRITE UR QL STRIP: NEGATIVE
NRBC BLD-RTO: 0 /100 WBC
PCO2 BLDA: 28.9 MMHG (ref 35–45)
PH SMN: 7.34 [PH] (ref 7.35–7.45)
PH UR STRIP: 7 [PH] (ref 5–8)
PLATELET # BLD AUTO: 197 K/UL (ref 150–450)
PMV BLD AUTO: 11.2 FL (ref 9.2–12.9)
PO2 BLDA: 85 MMHG (ref 80–100)
POC BE: -10 MMOL/L
POC SATURATED O2: 96 % (ref 95–100)
POCT GLUCOSE: 104 MG/DL (ref 70–110)
POCT GLUCOSE: 41 MG/DL (ref 70–110)
POCT GLUCOSE: 88 MG/DL (ref 70–110)
POTASSIUM SERPL-SCNC: 6 MMOL/L (ref 3.5–5.1)
PROT SERPL-MCNC: 7.7 G/DL (ref 6–8.4)
PROT UR QL STRIP: ABNORMAL
RBC # BLD AUTO: 5.12 M/UL (ref 4.6–6.2)
RBC #/AREA URNS HPF: 2 /HPF (ref 0–4)
SAMPLE: ABNORMAL
SITE: ABNORMAL
SODIUM SERPL-SCNC: 138 MMOL/L (ref 136–145)
SP GR UR STRIP: 1.01 (ref 1–1.03)
SP02: 97
TROPONIN I SERPL DL<=0.01 NG/ML-MCNC: 0.16 NG/ML (ref 0–0.03)
URN SPEC COLLECT METH UR: ABNORMAL
UROBILINOGEN UR STRIP-ACNC: NEGATIVE EU/DL
WBC # BLD AUTO: 13.92 K/UL (ref 3.9–12.7)
WBC #/AREA URNS HPF: 90 /HPF (ref 0–5)
WBC CLUMPS URNS QL MICRO: ABNORMAL
YEAST URNS QL MICRO: ABNORMAL

## 2023-07-26 PROCEDURE — 93010 EKG 12-LEAD: ICD-10-PCS | Mod: ,,, | Performed by: INTERNAL MEDICINE

## 2023-07-26 PROCEDURE — 87088 URINE BACTERIA CULTURE: CPT | Performed by: EMERGENCY MEDICINE

## 2023-07-26 PROCEDURE — 25000003 PHARM REV CODE 250: Performed by: EMERGENCY MEDICINE

## 2023-07-26 PROCEDURE — 96365 THER/PROPH/DIAG IV INF INIT: CPT

## 2023-07-26 PROCEDURE — 86803 HEPATITIS C AB TEST: CPT | Performed by: EMERGENCY MEDICINE

## 2023-07-26 PROCEDURE — 25000242 PHARM REV CODE 250 ALT 637 W/ HCPCS: Performed by: EMERGENCY MEDICINE

## 2023-07-26 PROCEDURE — 87186 SC STD MICRODIL/AGAR DIL: CPT | Performed by: EMERGENCY MEDICINE

## 2023-07-26 PROCEDURE — 87086 URINE CULTURE/COLONY COUNT: CPT | Performed by: EMERGENCY MEDICINE

## 2023-07-26 PROCEDURE — 99900035 HC TECH TIME PER 15 MIN (STAT)

## 2023-07-26 PROCEDURE — 11000001 HC ACUTE MED/SURG PRIVATE ROOM

## 2023-07-26 PROCEDURE — 85025 COMPLETE CBC W/AUTO DIFF WBC: CPT | Performed by: EMERGENCY MEDICINE

## 2023-07-26 PROCEDURE — 82550 ASSAY OF CK (CPK): CPT | Performed by: EMERGENCY MEDICINE

## 2023-07-26 PROCEDURE — 81000 URINALYSIS NONAUTO W/SCOPE: CPT | Performed by: EMERGENCY MEDICINE

## 2023-07-26 PROCEDURE — 93005 ELECTROCARDIOGRAM TRACING: CPT

## 2023-07-26 PROCEDURE — 96375 TX/PRO/DX INJ NEW DRUG ADDON: CPT

## 2023-07-26 PROCEDURE — 87389 HIV-1 AG W/HIV-1&-2 AB AG IA: CPT | Performed by: EMERGENCY MEDICINE

## 2023-07-26 PROCEDURE — 51702 INSERT TEMP BLADDER CATH: CPT

## 2023-07-26 PROCEDURE — 82962 GLUCOSE BLOOD TEST: CPT

## 2023-07-26 PROCEDURE — 83605 ASSAY OF LACTIC ACID: CPT | Performed by: EMERGENCY MEDICINE

## 2023-07-26 PROCEDURE — 83735 ASSAY OF MAGNESIUM: CPT | Performed by: EMERGENCY MEDICINE

## 2023-07-26 PROCEDURE — 96361 HYDRATE IV INFUSION ADD-ON: CPT

## 2023-07-26 PROCEDURE — 36600 WITHDRAWAL OF ARTERIAL BLOOD: CPT

## 2023-07-26 PROCEDURE — 84484 ASSAY OF TROPONIN QUANT: CPT | Performed by: EMERGENCY MEDICINE

## 2023-07-26 PROCEDURE — 83880 ASSAY OF NATRIURETIC PEPTIDE: CPT | Performed by: EMERGENCY MEDICINE

## 2023-07-26 PROCEDURE — 94640 AIRWAY INHALATION TREATMENT: CPT

## 2023-07-26 PROCEDURE — 99291 CRITICAL CARE FIRST HOUR: CPT

## 2023-07-26 PROCEDURE — 82803 BLOOD GASES ANY COMBINATION: CPT

## 2023-07-26 PROCEDURE — 87077 CULTURE AEROBIC IDENTIFY: CPT | Performed by: EMERGENCY MEDICINE

## 2023-07-26 PROCEDURE — 93010 ELECTROCARDIOGRAM REPORT: CPT | Mod: ,,, | Performed by: INTERNAL MEDICINE

## 2023-07-26 PROCEDURE — 80053 COMPREHEN METABOLIC PANEL: CPT | Performed by: EMERGENCY MEDICINE

## 2023-07-26 PROCEDURE — 63600175 PHARM REV CODE 636 W HCPCS: Performed by: EMERGENCY MEDICINE

## 2023-07-26 PROCEDURE — 25000003 PHARM REV CODE 250

## 2023-07-26 PROCEDURE — 87040 BLOOD CULTURE FOR BACTERIA: CPT | Performed by: EMERGENCY MEDICINE

## 2023-07-26 PROCEDURE — 36415 COLL VENOUS BLD VENIPUNCTURE: CPT | Performed by: EMERGENCY MEDICINE

## 2023-07-26 RX ORDER — SODIUM CHLORIDE 9 MG/ML
INJECTION, SOLUTION INTRAVENOUS ONCE
Status: COMPLETED | OUTPATIENT
Start: 2023-07-26 | End: 2023-07-27

## 2023-07-26 RX ORDER — FAMOTIDINE 20 MG/1
20 TABLET, FILM COATED ORAL DAILY
Status: DISCONTINUED | OUTPATIENT
Start: 2023-07-27 | End: 2023-07-28

## 2023-07-26 RX ORDER — MAGNESIUM SULFATE HEPTAHYDRATE 40 MG/ML
4 INJECTION, SOLUTION INTRAVENOUS ONCE
Status: COMPLETED | OUTPATIENT
Start: 2023-07-26 | End: 2023-07-27

## 2023-07-26 RX ORDER — INDOMETHACIN 25 MG/1
50 CAPSULE ORAL
Status: COMPLETED | OUTPATIENT
Start: 2023-07-26 | End: 2023-07-26

## 2023-07-26 RX ORDER — NOREPINEPHRINE BITARTRATE/D5W 4MG/250ML
PLASTIC BAG, INJECTION (ML) INTRAVENOUS
Status: COMPLETED
Start: 2023-07-26 | End: 2023-07-26

## 2023-07-26 RX ORDER — NOREPINEPHRINE BITARTRATE/D5W 4MG/250ML
0-3 PLASTIC BAG, INJECTION (ML) INTRAVENOUS CONTINUOUS
Status: DISCONTINUED | OUTPATIENT
Start: 2023-07-27 | End: 2023-07-28

## 2023-07-26 RX ORDER — MUPIROCIN 20 MG/G
OINTMENT TOPICAL 2 TIMES DAILY
Status: DISCONTINUED | OUTPATIENT
Start: 2023-07-27 | End: 2023-07-28 | Stop reason: HOSPADM

## 2023-07-26 RX ORDER — ALBUTEROL SULFATE 0.83 MG/ML
10 SOLUTION RESPIRATORY (INHALATION)
Status: COMPLETED | OUTPATIENT
Start: 2023-07-26 | End: 2023-07-26

## 2023-07-26 RX ADMIN — SODIUM CHLORIDE: 9 INJECTION, SOLUTION INTRAVENOUS at 09:07

## 2023-07-26 RX ADMIN — SODIUM CHLORIDE 1000 ML: 9 INJECTION, SOLUTION INTRAVENOUS at 09:07

## 2023-07-26 RX ADMIN — SODIUM ZIRCONIUM CYCLOSILICATE 10 G: 5 POWDER, FOR SUSPENSION ORAL at 09:07

## 2023-07-26 RX ADMIN — ALBUTEROL SULFATE 10 MG: 2.5 SOLUTION RESPIRATORY (INHALATION) at 10:07

## 2023-07-26 RX ADMIN — SODIUM BICARBONATE 50 MEQ: 84 INJECTION, SOLUTION INTRAVENOUS at 09:07

## 2023-07-26 RX ADMIN — NOREPINEPHRINE BITARTRATE 0.02 MCG/KG/MIN: 4 INJECTION, SOLUTION INTRAVENOUS at 11:07

## 2023-07-26 RX ADMIN — INSULIN HUMAN 5 UNITS: 100 INJECTION, SOLUTION PARENTERAL at 09:07

## 2023-07-26 RX ADMIN — DEXTROSE MONOHYDRATE 25 G: 25 INJECTION, SOLUTION INTRAVENOUS at 09:07

## 2023-07-27 ENCOUNTER — CLINICAL SUPPORT (OUTPATIENT)
Dept: SMOKING CESSATION | Facility: CLINIC | Age: 73
End: 2023-07-27
Payer: COMMERCIAL

## 2023-07-27 DIAGNOSIS — F17.200 NEEDS SMOKING CESSATION EDUCATION: ICD-10-CM

## 2023-07-27 DIAGNOSIS — F17.200 NICOTINE DEPENDENCE: Primary | ICD-10-CM

## 2023-07-27 PROBLEM — A41.9 SEPTIC SHOCK: Status: ACTIVE | Noted: 2023-07-27

## 2023-07-27 PROBLEM — N30.00 ACUTE CYSTITIS WITHOUT HEMATURIA: Status: ACTIVE | Noted: 2023-07-27

## 2023-07-27 PROBLEM — E83.42 HYPOMAGNESEMIA: Status: ACTIVE | Noted: 2023-07-27

## 2023-07-27 PROBLEM — E87.5 HYPERKALEMIA: Status: ACTIVE | Noted: 2023-07-27

## 2023-07-27 PROBLEM — R65.21 SEPTIC SHOCK: Status: ACTIVE | Noted: 2023-07-27

## 2023-07-27 PROBLEM — E66.01 MORBID OBESITY: Status: RESOLVED | Noted: 2021-09-14 | Resolved: 2023-07-27

## 2023-07-27 PROBLEM — R79.89 ELEVATED TROPONIN: Status: ACTIVE | Noted: 2023-07-27

## 2023-07-27 PROBLEM — N17.9 ACUTE RENAL FAILURE SUPERIMPOSED ON STAGE 3B CHRONIC KIDNEY DISEASE: Status: ACTIVE | Noted: 2022-02-04

## 2023-07-27 LAB
ANION GAP SERPL CALC-SCNC: 11 MMOL/L (ref 8–16)
ANION GAP SERPL CALC-SCNC: 12 MMOL/L (ref 8–16)
ANION GAP SERPL CALC-SCNC: 8 MMOL/L (ref 8–16)
BASOPHILS # BLD AUTO: 0.02 K/UL (ref 0–0.2)
BASOPHILS NFR BLD: 0.2 % (ref 0–1.9)
BUN SERPL-MCNC: 65 MG/DL (ref 8–23)
BUN SERPL-MCNC: 69 MG/DL (ref 8–23)
BUN SERPL-MCNC: 74 MG/DL (ref 8–23)
BUN SERPL-MCNC: 76 MG/DL (ref 8–23)
BUN SERPL-MCNC: 77 MG/DL (ref 8–23)
CALCIUM SERPL-MCNC: 10.2 MG/DL (ref 8.7–10.5)
CALCIUM SERPL-MCNC: 10.7 MG/DL (ref 8.7–10.5)
CALCIUM SERPL-MCNC: 11.1 MG/DL (ref 8.7–10.5)
CALCIUM SERPL-MCNC: 8.8 MG/DL (ref 8.7–10.5)
CALCIUM SERPL-MCNC: 9.5 MG/DL (ref 8.7–10.5)
CHLORIDE SERPL-SCNC: 111 MMOL/L (ref 95–110)
CHLORIDE SERPL-SCNC: 112 MMOL/L (ref 95–110)
CHLORIDE SERPL-SCNC: 112 MMOL/L (ref 95–110)
CHLORIDE SERPL-SCNC: 113 MMOL/L (ref 95–110)
CHLORIDE SERPL-SCNC: 114 MMOL/L (ref 95–110)
CO2 SERPL-SCNC: 17 MMOL/L (ref 23–29)
CO2 SERPL-SCNC: 17 MMOL/L (ref 23–29)
CO2 SERPL-SCNC: 19 MMOL/L (ref 23–29)
CREAT SERPL-MCNC: 2.6 MG/DL (ref 0.5–1.4)
CREAT SERPL-MCNC: 2.7 MG/DL (ref 0.5–1.4)
CREAT SERPL-MCNC: 3 MG/DL (ref 0.5–1.4)
CREAT SERPL-MCNC: 3.2 MG/DL (ref 0.5–1.4)
CREAT SERPL-MCNC: 3.3 MG/DL (ref 0.5–1.4)
DIFFERENTIAL METHOD: ABNORMAL
EOSINOPHIL # BLD AUTO: 0 K/UL (ref 0–0.5)
EOSINOPHIL NFR BLD: 0.2 % (ref 0–8)
ERYTHROCYTE [DISTWIDTH] IN BLOOD BY AUTOMATED COUNT: 14.4 % (ref 11.5–14.5)
EST. GFR  (NO RACE VARIABLE): 19 ML/MIN/1.73 M^2
EST. GFR  (NO RACE VARIABLE): 20 ML/MIN/1.73 M^2
EST. GFR  (NO RACE VARIABLE): 21 ML/MIN/1.73 M^2
EST. GFR  (NO RACE VARIABLE): 24 ML/MIN/1.73 M^2
EST. GFR  (NO RACE VARIABLE): 25 ML/MIN/1.73 M^2
GLUCOSE SERPL-MCNC: 113 MG/DL (ref 70–110)
GLUCOSE SERPL-MCNC: 116 MG/DL (ref 70–110)
GLUCOSE SERPL-MCNC: 133 MG/DL (ref 70–110)
GLUCOSE SERPL-MCNC: 84 MG/DL (ref 70–110)
GLUCOSE SERPL-MCNC: 94 MG/DL (ref 70–110)
HCT VFR BLD AUTO: 37.5 % (ref 40–54)
HGB BLD-MCNC: 12.4 G/DL (ref 14–18)
IMM GRANULOCYTES # BLD AUTO: 0.1 K/UL (ref 0–0.04)
IMM GRANULOCYTES NFR BLD AUTO: 0.8 % (ref 0–0.5)
LACTATE SERPL-SCNC: 1.2 MMOL/L (ref 0.5–2.2)
LACTATE SERPL-SCNC: 1.3 MMOL/L (ref 0.5–2.2)
LYMPHOCYTES # BLD AUTO: 2.1 K/UL (ref 1–4.8)
LYMPHOCYTES NFR BLD: 16.7 % (ref 18–48)
MAGNESIUM SERPL-MCNC: 3.1 MG/DL (ref 1.6–2.6)
MCH RBC QN AUTO: 29.8 PG (ref 27–31)
MCHC RBC AUTO-ENTMCNC: 33.1 G/DL (ref 32–36)
MCV RBC AUTO: 90 FL (ref 82–98)
MONOCYTES # BLD AUTO: 1.1 K/UL (ref 0.3–1)
MONOCYTES NFR BLD: 8.8 % (ref 4–15)
NEUTROPHILS # BLD AUTO: 9.4 K/UL (ref 1.8–7.7)
NEUTROPHILS NFR BLD: 73.3 % (ref 38–73)
NRBC BLD-RTO: 0 /100 WBC
PLATELET # BLD AUTO: 201 K/UL (ref 150–450)
PMV BLD AUTO: 11.2 FL (ref 9.2–12.9)
POCT GLUCOSE: 106 MG/DL (ref 70–110)
POCT GLUCOSE: 117 MG/DL (ref 70–110)
POCT GLUCOSE: 87 MG/DL (ref 70–110)
POCT GLUCOSE: 92 MG/DL (ref 70–110)
POTASSIUM SERPL-SCNC: 3.8 MMOL/L (ref 3.5–5.1)
POTASSIUM SERPL-SCNC: 4.1 MMOL/L (ref 3.5–5.1)
POTASSIUM SERPL-SCNC: 4.1 MMOL/L (ref 3.5–5.1)
POTASSIUM SERPL-SCNC: 4.2 MMOL/L (ref 3.5–5.1)
POTASSIUM SERPL-SCNC: 4.3 MMOL/L (ref 3.5–5.1)
RBC # BLD AUTO: 4.16 M/UL (ref 4.6–6.2)
SODIUM SERPL-SCNC: 141 MMOL/L (ref 136–145)
SODIUM SERPL-SCNC: 142 MMOL/L (ref 136–145)
SODIUM SERPL-SCNC: 143 MMOL/L (ref 136–145)
TROPONIN I SERPL DL<=0.01 NG/ML-MCNC: 0.11 NG/ML (ref 0–0.03)
TROPONIN I SERPL DL<=0.01 NG/ML-MCNC: 0.12 NG/ML (ref 0–0.03)
TROPONIN I SERPL DL<=0.01 NG/ML-MCNC: 0.12 NG/ML (ref 0–0.03)
WBC # BLD AUTO: 12.8 K/UL (ref 3.9–12.7)

## 2023-07-27 PROCEDURE — 84484 ASSAY OF TROPONIN QUANT: CPT | Performed by: NURSE PRACTITIONER

## 2023-07-27 PROCEDURE — 99291 PR CRITICAL CARE, E/M 30-74 MINUTES: ICD-10-PCS | Mod: ,,, | Performed by: NURSE PRACTITIONER

## 2023-07-27 PROCEDURE — 94761 N-INVAS EAR/PLS OXIMETRY MLT: CPT

## 2023-07-27 PROCEDURE — 63600175 PHARM REV CODE 636 W HCPCS: Performed by: EMERGENCY MEDICINE

## 2023-07-27 PROCEDURE — S4991 NICOTINE PATCH NONLEGEND: HCPCS | Performed by: INTERNAL MEDICINE

## 2023-07-27 PROCEDURE — 83605 ASSAY OF LACTIC ACID: CPT | Mod: 91 | Performed by: NURSE PRACTITIONER

## 2023-07-27 PROCEDURE — 83735 ASSAY OF MAGNESIUM: CPT | Performed by: NURSE PRACTITIONER

## 2023-07-27 PROCEDURE — 63600175 PHARM REV CODE 636 W HCPCS: Performed by: NURSE PRACTITIONER

## 2023-07-27 PROCEDURE — 94640 AIRWAY INHALATION TREATMENT: CPT

## 2023-07-27 PROCEDURE — 36556 INSERT NON-TUNNEL CV CATH: CPT

## 2023-07-27 PROCEDURE — 25000003 PHARM REV CODE 250: Performed by: EMERGENCY MEDICINE

## 2023-07-27 PROCEDURE — 87081 CULTURE SCREEN ONLY: CPT | Performed by: NURSE PRACTITIONER

## 2023-07-27 PROCEDURE — 80048 BASIC METABOLIC PNL TOTAL CA: CPT | Mod: 91 | Performed by: NURSE PRACTITIONER

## 2023-07-27 PROCEDURE — 20000000 HC ICU ROOM

## 2023-07-27 PROCEDURE — 85025 COMPLETE CBC W/AUTO DIFF WBC: CPT | Performed by: NURSE PRACTITIONER

## 2023-07-27 PROCEDURE — 99407 BEHAV CHNG SMOKING > 10 MIN: CPT | Mod: S$GLB,,,

## 2023-07-27 PROCEDURE — 25000003 PHARM REV CODE 250: Performed by: NURSE PRACTITIONER

## 2023-07-27 PROCEDURE — 25000242 PHARM REV CODE 250 ALT 637 W/ HCPCS: Performed by: NURSE PRACTITIONER

## 2023-07-27 PROCEDURE — 92610 EVALUATE SWALLOWING FUNCTION: CPT

## 2023-07-27 PROCEDURE — 63600175 PHARM REV CODE 636 W HCPCS: Performed by: INTERNAL MEDICINE

## 2023-07-27 PROCEDURE — 99407 PR TOBACCO USE CESSATION INTENSIVE >10 MINUTES: ICD-10-PCS | Mod: S$GLB,,,

## 2023-07-27 PROCEDURE — 92523 SPEECH SOUND LANG COMPREHEN: CPT

## 2023-07-27 PROCEDURE — 99291 CRITICAL CARE FIRST HOUR: CPT | Mod: ,,, | Performed by: NURSE PRACTITIONER

## 2023-07-27 PROCEDURE — 25000003 PHARM REV CODE 250: Performed by: INTERNAL MEDICINE

## 2023-07-27 PROCEDURE — 80048 BASIC METABOLIC PNL TOTAL CA: CPT | Performed by: NURSE PRACTITIONER

## 2023-07-27 PROCEDURE — 84484 ASSAY OF TROPONIN QUANT: CPT | Mod: 91 | Performed by: NURSE PRACTITIONER

## 2023-07-27 RX ORDER — BUDESONIDE 0.5 MG/2ML
0.5 INHALANT ORAL EVERY 12 HOURS
Status: DISCONTINUED | OUTPATIENT
Start: 2023-07-27 | End: 2023-07-28 | Stop reason: HOSPADM

## 2023-07-27 RX ORDER — ATORVASTATIN CALCIUM 40 MG/1
40 TABLET, FILM COATED ORAL DAILY
Status: DISCONTINUED | OUTPATIENT
Start: 2023-07-27 | End: 2023-07-28 | Stop reason: HOSPADM

## 2023-07-27 RX ORDER — IBUPROFEN 200 MG
1 TABLET ORAL DAILY
Status: DISCONTINUED | OUTPATIENT
Start: 2023-07-27 | End: 2023-07-28 | Stop reason: HOSPADM

## 2023-07-27 RX ORDER — LEVOTHYROXINE SODIUM 25 UG/1
25 TABLET ORAL
Status: DISCONTINUED | OUTPATIENT
Start: 2023-07-27 | End: 2023-07-28 | Stop reason: HOSPADM

## 2023-07-27 RX ORDER — ARFORMOTEROL TARTRATE 15 UG/2ML
15 SOLUTION RESPIRATORY (INHALATION) 2 TIMES DAILY
Status: DISCONTINUED | OUTPATIENT
Start: 2023-07-27 | End: 2023-07-28 | Stop reason: HOSPADM

## 2023-07-27 RX ORDER — IPRATROPIUM BROMIDE AND ALBUTEROL SULFATE 2.5; .5 MG/3ML; MG/3ML
3 SOLUTION RESPIRATORY (INHALATION) EVERY 4 HOURS PRN
Status: DISCONTINUED | OUTPATIENT
Start: 2023-07-27 | End: 2023-07-28 | Stop reason: HOSPADM

## 2023-07-27 RX ORDER — HEPARIN SODIUM 5000 [USP'U]/ML
5000 INJECTION, SOLUTION INTRAVENOUS; SUBCUTANEOUS EVERY 8 HOURS
Status: DISCONTINUED | OUTPATIENT
Start: 2023-07-27 | End: 2023-07-28 | Stop reason: HOSPADM

## 2023-07-27 RX ADMIN — CEFTRIAXONE 1 G: 1 INJECTION, POWDER, FOR SOLUTION INTRAMUSCULAR; INTRAVENOUS at 11:07

## 2023-07-27 RX ADMIN — HEPARIN SODIUM 5000 UNITS: 5000 INJECTION INTRAVENOUS; SUBCUTANEOUS at 09:07

## 2023-07-27 RX ADMIN — ATORVASTATIN CALCIUM 40 MG: 40 TABLET, FILM COATED ORAL at 08:07

## 2023-07-27 RX ADMIN — FAMOTIDINE 20 MG: 20 TABLET, FILM COATED ORAL at 08:07

## 2023-07-27 RX ADMIN — HEPARIN SODIUM 5000 UNITS: 5000 INJECTION INTRAVENOUS; SUBCUTANEOUS at 02:07

## 2023-07-27 RX ADMIN — CEFTRIAXONE 1 G: 1 INJECTION, POWDER, FOR SOLUTION INTRAMUSCULAR; INTRAVENOUS at 01:07

## 2023-07-27 RX ADMIN — MUPIROCIN: 20 OINTMENT TOPICAL at 09:07

## 2023-07-27 RX ADMIN — ARFORMOTEROL TARTRATE 15 MCG: 15 SOLUTION RESPIRATORY (INHALATION) at 07:07

## 2023-07-27 RX ADMIN — LEVOTHYROXINE SODIUM 25 MCG: 25 TABLET ORAL at 05:07

## 2023-07-27 RX ADMIN — NOREPINEPHRINE BITARTRATE 0.04 MCG/KG/MIN: 4 INJECTION, SOLUTION INTRAVENOUS at 02:07

## 2023-07-27 RX ADMIN — BUDESONIDE 0.5 MG: 0.5 INHALANT ORAL at 07:07

## 2023-07-27 RX ADMIN — MUPIROCIN: 20 OINTMENT TOPICAL at 08:07

## 2023-07-27 RX ADMIN — NICOTINE 1 PATCH: 21 PATCH, EXTENDED RELEASE TRANSDERMAL at 02:07

## 2023-07-27 RX ADMIN — IPRATROPIUM BROMIDE AND ALBUTEROL SULFATE 3 ML: 2.5; .5 SOLUTION RESPIRATORY (INHALATION) at 07:07

## 2023-07-27 RX ADMIN — MAGNESIUM SULFATE HEPTAHYDRATE 4 G: 40 INJECTION, SOLUTION INTRAVENOUS at 12:07

## 2023-07-27 NOTE — CONSULTS
Spoke with patient at bedside about living conditions. Patient lives with his friend, Zohaib who is unable to assist in his care. He has 2 adult children that can check on him at discharge. During last admission in April, AUDREY spoke with Fidelia about her father unable to care for himself. He refuses to move in with her and she did not wish for long-term placement at this time. PT/OT ordered for weakness and debility. Possible SNF placement at discharge which can transition into long-term if patient is agreeable. Was discharged with [facility] in April but was discharged from their service 6/20/23.

## 2023-07-27 NOTE — CONSULTS
[facility] - Intensive Care ([facility])  Adult Nutrition  Consult Note    SUMMARY     Recommendations  1. Recommend pt diet order be advanced to Cardiac; Low Na; 500 mL + Total output diet when medically appropriate.   2. Recommend to offer pt Boost plus TID if needed.  3. Recommend pt to receive Pal BID to assist with skin tear when medical appropriate.   4. Recommend pt receives feeding assistants/ PO intake encouragement during all meals.   5.  Recommend to consider initiation onto alternative nutrition support if pt diet order can not be advanced within 24 hrs.   6. Recommend to perform NFPE during RD follow up.   7. Weigh daily.    Goals:   1. Pt diet order will be advanced within 24 hrs.   2. Pt will consume >50% of EEN and EPN prior to RD follow up. 3. Pt will receive and consume >75% of pal prior to RD follow up.   4. NFPE will be performed during RD follow up.  Nutrition Goal Status: new  Communication of RD Recs: other (comment) (POC: Sticky Note)    Assessment and Plan    Nutrition Problem  Inadequate PO intake    Related to (etiology):   Decreased ability to consume sufficient nutrition    Signs and Symptoms (as evidenced by):   NPO    Interventions(treatment strategy):  1. Fat/Low Na/Decreased Fluid modified diet  2. Commercial Beverage  3. Mineral supplement therapy for wound healing  4. Collaboration of care with medical providers.    Nutrition Diagnosis Status:   New       Malnutrition Assessment     Skin (Micronutrient): red, dry, turgor reduced (Flaky)   Micronutrient Evaluation Summary: suspected deficiency   Weight Loss (Malnutrition): greater than 5% in 1 month (10% 2 months)                         Reason for Assessment    Reason For Assessment: consult  Diagnosis: infection/sepsis  Relevant Medical History: CHF, CKD, COPD, Dementia, HLD, PVA, Speech abnormality, Tobacco user  Interdisciplinary Rounds: did not attend  General Information Comments:   7/27: 73 y/o male admitted w/ active  principal problem of Septic shock. Unable to understand pt d/t Speech abnormality.  Spoke with pt RN whom states the pt may have a hard time feeding himself and may require assistance. NFPE not performed, pt confused and displays poor mentation. Per past wt encounters the pt has had an 18 lbs wt loss within 2 months, 10% wt change. The pt is currently charted to weigh 163 lb 2.3 oz, BMI 22.13 (Underweight for age) Pt LBM was 7/26, no consistency noted. Pt is noted to have skin tear. Pt currently on room air. Dietitian will continue to follow.  Nutrition Discharge Planning: Cardiac diet. Finger foods    Wt Readings from Last 20 Encounters:   07/27/23 74 kg (163 lb 2.3 oz)   05/24/23 82.5 kg (181 lb 12.3 oz)   04/22/23 86 kg (189 lb 9.5 oz)   04/20/23 74.2 kg (163 lb 9.3 oz)   07/20/22 99.5 kg (219 lb 4 oz)   07/05/22 98 kg (216 lb 0.8 oz)   06/22/22 97.9 kg (215 lb 13.3 oz)   06/10/22 98.3 kg (216 lb 11.4 oz)   02/04/22 102.3 kg (225 lb 6.7 oz)   01/13/22 101.8 kg (224 lb 6.9 oz)   01/07/22 101.8 kg (224 lb 6.9 oz)   01/06/22 101.8 kg (224 lb 6.9 oz)   01/04/22 89 kg (196 lb 3.4 oz)   12/15/21 103.7 kg (228 lb 8.1 oz)   11/15/21 104.5 kg (230 lb 6.1 oz)   11/04/21 110.6 kg (243 lb 13.3 oz)   11/04/21 111.3 kg (245 lb 4.8 oz)   10/21/21 112.9 kg (249 lb)   10/21/21 109.8 kg (242 lb)   10/21/21 109.8 kg (242 lb)   ]    Nutrition Risk Screen    Nutrition Risk Screen: large or nonhealing wound, burn or pressure injury, unintentional loss of 10 lbs or more in the past 2 months, reduced oral intake over the last month    Nutrition/Diet History    Spiritual, Cultural Beliefs, Evangelical Practices, Values that Affect Care: no  Food Allergies: NKFA  Factors Affecting Nutritional Intake: impaired cognitive status/motor control, NPO    Anthropometrics    Temp: 97.6 °F (36.4 °C)  Height Method: Stated  Height: 6' (182.9 cm)  Height (inches): 72 in  Weight Method: Bed Scale  Weight: 74 kg (163 lb 2.3 oz)  Weight (lb): 163.14  lb  Ideal Body Weight (IBW), Male: 178 lb  % Ideal Body Weight, Male (lb): 91.65 %  BMI (Calculated): 22.1  BMI Grade: 18.5-24.9 - normal (Underweight for age)       Lab/Procedures/Meds  BMP  Lab Results   Component Value Date     07/27/2023    K 4.1 07/27/2023     (H) 07/27/2023    CO2 19 (L) 07/27/2023    BUN 69 (H) 07/27/2023    CREATININE 2.7 (H) 07/27/2023    CALCIUM 9.5 07/27/2023    ANIONGAP 11 07/27/2023    EGFRNORACEVR 24 (A) 07/27/2023       Pertinent Labs Reviewed: reviewed  Pertinent Medications Reviewed: reviewed  Scheduled Meds:   arformoteroL  15 mcg Nebulization BID    atorvastatin  40 mg Oral Daily    budesonide  0.5 mg Nebulization Q12H    cefTRIAXone (ROCEPHIN) IVPB  1 g Intravenous Q24H    famotidine  20 mg Oral Daily    heparin (porcine)  5,000 Units Subcutaneous Q8H    levothyroxine  25 mcg Oral Before breakfast    mupirocin   Nasal BID    nicotine  1 patch Transdermal Daily     Continuous Infusions:   NORepinephrine bitartrate-D5W 0.04 mcg/kg/min (07/27/23 1045)     PRN Meds:.albuterol-ipratropium, pneumoc 20-tray conj-dip cr(PF)    Estimated/Assessed Needs    Weight Used For Calorie Calculations: 74 kg (163 lb 2.3 oz)  Energy Calorie Requirements (kcal): 8492-3099 (30-35 kcal/kg per Underweight vs skin tear)  Energy Need Method: Kcal/kg  Protein Requirements:  (1.2-1.5 g/kg per Underweight vs Skin tear)  Weight Used For Protein Calculations: 74 kg (163 lb 2.3 oz)  Fluid Requirements (mL): 500 + Total Urine Output     RDA Method (mL): 2220  CHO Requirement: 278-324      Nutrition Prescription Ordered    Current Diet Order: NONE    Evaluation of Received Nutrient/Fluid Intake    I/O: +2228.5  Energy Calories Required: not meeting needs  Protein Required: not meeting needs  Fluid Required: exceeds needs  Total Fluid Intake (mL/kg): 2778.5  Tolerance: not tolerating  % Intake of Estimated Energy Needs: 0 - 25 %  % Meal Intake: NPO    Nutrition Risk    Level of Risk/Frequency of  Follow-up: high (x2 weekly)       Monitor and Evaluation    Food and Nutrient Intake: energy intake, food and beverage intake  Food and Nutrient Adminstration: diet order  Knowledge/Beliefs/Attitudes: food and nutrition knowledge/skill, beliefs and attitudes  Physical Activity and Function: nutrition-related ADLs and IADLs, factors affecting access to physical activity  Anthropometric Measurements: weight, body mass index, weight change  Biochemical Data, Medical Tests and Procedures: electrolyte and renal panel, gastrointestinal profile, glucose/endocrine profile, inflammatory profile  Nutrition-Focused Physical Findings: overall appearance, extremities, muscles and bones, head and eyes, skin       Nutrition Follow-Up    RD Follow-up?: Yes  Ambrose Steinberg, Registration Eligible, Provisional LDN

## 2023-07-27 NOTE — ASSESSMENT & PLAN NOTE
Source - UTI  Blood and urine culture sent  Shock evidenced by MAP < 65, LISHA, encephalopathy  Review of perfusion post IVF bolus with ongoing shock evidenced by MAP < 65  Levophed ordered; titrate per order for goal MAP > 65  Source control - rocephin IV q24; monitor cultures to guide adjustments

## 2023-07-27 NOTE — PLAN OF CARE
POC reviewed with pt. Pt admitted to unit this am. Upon arrival pt connected to monitor, bath completed, and assessed. Electrolyte replacement continued from ED and awaiting results from am labs. Levophed increased for MAP 65. Urine output adequate for my shift. Report to be given to day shift RN who will assume care.

## 2023-07-27 NOTE — ASSESSMENT & PLAN NOTE
Patient is identified as having Combined Systolic and Diastolic heart failure that is Chronic. CHF is currently controlled. Latest ECHO performed and demonstrates- Results for orders placed during the hospital encounter of 04/21/23  Echo  Interpretation Summary  · The left ventricle is normal in size with mild concentric hypertrophy and normal systolic function.  · Grade I left ventricular diastolic dysfunction.  · Normal right ventricular size with normal right ventricular systolic function.  · Intermediate central venous pressure (8 mmHg).  · The estimated ejection fraction is 55%.  monitor clinical status closely. Monitor on telemetry. Patient is off CHF pathway.    Monitor strict Is&Os and daily weights.    Continue to stress to patient importance of self efficacy and  on diet for CHF.   Last BNP reviewed- and noted below   Recent Labs   Lab 07/26/23  2256   BNP 85     -high risk for decompensation post sepsis IVF bolus, hold additional IVF  -hypotensive requiring pressor, hold entresto and metoprolol for now

## 2023-07-27 NOTE — ASSESSMENT & PLAN NOTE
Baseline creatinine 1.6  Suspect worsened s/t sepsis, shock  S/p IVF resuscitation; given heart failure will hold additional IVF  Pressor to support MAP for goal 65 or greater  Monitor creatinine trend

## 2023-07-27 NOTE — PT/OT/SLP EVAL
"Speech Language Pathology Evaluation  Cognitive/Bedside Swallow    Patient Name:  Zohaib Wilson .   MRN:  [id number]  Admitting Diagnosis: Septic shock    Recommendations:                  General Recommendations:  Dysphagia therapy, MBSS and/or goals of care discussion with provider and pt/family  Diet recommendations:  NPO   Aspiration Precautions: Frequent oral care, Meds crushed in puree, and Strict aspiration precautions   General Precautions: Standard, aspiration  Communication strategies:  provide increased time to answer and Careful listening needed s/t dysarthria    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  Pt unkempt, poor oral hygiene/dentition noted with significant weight loss and smell of urine during assessment.  Reviewed recommendations for NH placement and/or 24/hour assist at home per PT evaluation 3 months ago.  Pt refused living with his daughter and family refused NH placement at that time.    GOALS of CARE discussion related to dysphagia:  Pt stated he wants to ea/drink by mouth, diet of his choosing and would NOT want PEG tube if dysphagia confirmed.  Recommended provider discussion with pt/family.    Prior Intubation " HX:  N/A    Modified Barium Swallow: Recommended 7/27/23, pending goals of care discussion.    CT CHEST ABDOMEN PELVIS WITHOUT CONTRAST(XPD)     CLINICAL HISTORY:  Weight loss, unintended;     TECHNIQUE:  The patient was surveyed from the thoracic inlet through the pelvis without oral and IV contrast.  Data was reconstructed for coronal, sagittal, and axial images.     COMPARISON:  01/14/2021     FINDINGS:  The soft tissues at the base of the neck are unremarkable.  The thyroid gland is normal in size and configuration.  There is no abnormal lymph node enlargement.     Abnormal enlarged paratracheal lymph node measuring 3.2 x 3.1 cm.  Recommend pulmonary follow-up .  the hilar contours are unremarkable.  The esophagus is normal in course and contour.  Mild gynecomastia.     The thoracic aorta is normal in course, caliber, and contour without significant atherosclerotic calcifications.The heart does not appear enlarged and there is no pericardial effusion.  Moderate to severe coronary artery disease.     The trachea and proximal airways are patent.     Diffuse chronic interstitial lung disease.  Question atelectasis or small infiltrate within the right upper lobe.  Motion limited.  Significant emphysematous changes are seen throughout the upper lobes.     The liver is normal in size and attenuation.  Several indeterminate hypodense lesions are seen within the liver measuring up to 14 mm not fully characterized on noncontrast imaging..  Moderate gallbladder distension with calcification which could reflect porcelain gallbladder there is no intra-or extrahepatic biliary ductal dilatation.     The stomach, spleen, pancreas, and adrenal glands are unremarkable.     Bilateral renal cortical thinning.  Mild nonspecific perinephric stranding.  Punctate nonobstructing stones bilaterally.  Vascular calcifications are also noted.  13 mm hypodense lesion within the upper pole right kidney not fully characterized on noncontrast  imaging but likely reflects a cyst.  Additional indeterminate lesions seen within the midpole right kidney measuring 2.3 cm.  Consider follow-up ultrasound.  There is no evidence of hydronephrosis.  The ureters appear normal in course and caliber without evidence of ureteral dilatation. Mild nonspecific bladder wall thickening which can be seen with cystitis.     The abdominal aorta is normal in course and caliber with severe atherosclerotic calcifications.  Small bilateral fat containing inguinal hernias.     The visualized loops of small bowel show no evidence of obstruction or inflammation. Large bowel is unremarkable.  Moderate constipation.  There is no ascites, free fluid, or intraperitoneal free air.     Shotty retroperitoneal nodes.     Diffuse osteopenia.  Moderate degenerative changes throughout the spine greatest within the lower lumbar spine..  Disc space narrowing and posterior disc osteophyte complex and facet arthropathy noted at L5/S1 producing moderate neural foraminal narrowing bilaterally.  Suspect degenerative pars defects at L4.     Superior endplate wedge deformities at L1 and L2.  Mild sclerotic changes within these bones.  MRI or bone scan can be obtained as clinically warranted.     Impression:     Evaluation of solid organ and vascular pathology is limited due to lack of IV contrast.     Moderate constipation including fecal impaction of the rectum.     Abnormal enlarged paratracheal lymph node measuring 3.2 x 3.1 cm. Recommend pulmonary follow-up     Porcelain gallbladder.     See additional findings and recommendations above.     All CT scans at this facility use dose modulation, iterative reconstruction and/or weight based dosing when appropriate to reduce radiation dose to as low as reasonably achievable.        Electronically signed by: Zohaib Dumas MD  Date:                                            04/21/2023  Time:                                           06:42    CT HEAD WITHOUT  CONTRAST     CLINICAL HISTORY:  Head trauma, minor (Age >= 65y);     TECHNIQUE:  Low dose axial images were obtained through the head.  Coronal and sagittal reformations were also performed. Contrast was not administered.     COMPARISON:  04/21/2023     FINDINGS:  No acute intracranial hemorrhage or mass effect seen as visualized with motion degradation.  Chronic findings similar to previous.  No acute or adverse bony finding     Impression:     No acute or adverse finding as visualized motion degradation        Electronically signed by: Lashanda Ramsay  Date:                                            07/26/2023  Time:                                           20:10    XR CHEST 1 VIEW FOR LINE/TUBE PLACEMENT     CLINICAL HISTORY:  Central line placement;     COMPARISON:  None     Impression:  FINDINGS/  Right-sided central line tip overlies the SVC in good position.  Patchy infiltrates are seen bilaterally greatest within the lower lobes and right upper lobe.  No pneumothorax or pleural effusion.  Heart size is normal.  Aorta demonstrates atherosclerotic disease.        Electronically signed by: Zohaib Dumas MD  Date:                                            07/27/2023  Time:                                           06:59    Prior diet: Pt reportedly consumes a regular diet.  Significant weightloss reported.  Pt dependent on his roommate for food, as he is unable to cook.    Subjective     Pt seen bedside for ST evaluation.  No c/o pain.  No family present.  Family reportedly coming to hospital today.  Patient goals: to eat/drink     Pain/Comfort:  Pain Rating 1: 0/10  Pain Rating Post-Intervention 1: 0/10  Pain Rating 2: 0/10  Pain Rating Post-Intervention 2: 0/10    Respiratory Status: Room air    Objective:     Cognitive Status:    Hx dementia, dependent on ADL's    Pt able to communicate basic wants/needs     Receptive Language:   Comprehension:   WFL with basic /wh/ questions, commands  Hearing loss  noted    Pragmatics:    WFL    Expressive Language:  Verbal:    Functional in basic conversation      Motor Speech:  Moderate-severe dysarthria   Improved intelligibility with increased vocal intensity    Voice:   Hoarse quality  Intermittent audible wet quality also noted    Oral Musculature Evaluation  Oral Musculature: general weakness  Dentition: scattered dentition, teeth in poor condition  Secretion Management: adequate  Mucosal Quality: dry  Mandibular Strength and Mobility: impaired  Oral Labial Strength and Mobility: WFL  Lingual Strength and Mobility: impaired strength  Velar Elevation: WFL  Buccal Strength and Mobility: WFL  Volitional Cough: present, productive  Volitional Swallow: present  Voice Prior to PO Intake: hoarse quality    Bedside Clinical Swallow Evaluation:   [place] Swallow Protocol:  [place] Swallow Protocol dictates patient remain NPO if failed screener (Emilier et al. 2014).  The [place] Swallow Protocol was administered. The patient was alert and provided the instructions prior to the beginning of the protocol. The patient consumed 3 oz before putting the cup down. Patient drank with consecutive swallows. Patient with cough & wet vocal quality.     Clinical Swallow Examination:   Of note, patient self-fed throughout evaluation and very impulsive. +cough present with/without PO intake; however, increased frequency during bedside CSE. Patient presented with:     CONSISTENCY  NOTES   THIN (IDDSI 0) 3 oz water challenge (multiple trials)  Cup/straw    +Coughing, wet vocal quality post Elaine Swallow.  Frequent belching present post deglutition.   PUREE (IDDSI 4/Extremely Thick)   TSP/TBSP bites of pudding/applesauce (entire cup) No overt s/s of dysphagia   SOLID (IDDSI 7/Regular) Bite of Kerri Doone cookie    Reduced oral efficiency. Poor dentition/oral hygiene.     Thickened liquids were not used in this assessment. Leon (2018) reported that thickened liquids have no sound evidence as reducing risk  of pneumonia in patients with dysphagia and can cause harm by increasing risk of dehydration. It also presents an increased risk of UTI, electrolyte imbalance, constipation, fecal impaction, cognitive impairment, functional decline, and even death (Langmore, 2002; Bill, 2016).  This supports the assertion that we should confirm a patient requires thickened liquids with an instrumental swallow study prior to recommending them.  Thickened liquids are associated with risks including dehydration, increased pharyngeal residue, potential interference with medication absorption, and decreased quality of life (Janice, 2013). Thickened liquids are also more likely to be silently aspirated than thin liquids (Matt et al., 2018).     References:   Janice SALAS (2013). Thickening agents used for dysphagia management: Effect on bioavailability of water, medication and feelings of satiety. Nutrition Journal, 12, 54. https://doi.org/10.1186/4433-4894-10-54    CHARI Gutierrez., JOANN Marrero, JAYJAY Cummings, & JOSUE Vega (2018). Cough response to aspiration in thin and thick fluids during FEES in hospitalized inpatients. International journal of language & communication disorders, 53(5), 909-918. https://doi.org/10.1111/8459-6219.44808    INTERPRETATION:  Clinical swallow evaluation (CSE) revealed oral phase characterized by lingual, labial, and buccal strength and range of motion reduced for lip closure, bolus preparation and propulsion. The patient had no anterior loss of the bolus with complete closure of the lips around the utensils. No residue remained in the oral cavity following the swallow. Patient with overt clinical sign/symptoms of aspiration. Patient presents with a possibly inefficient swallow as indicated by weakness with reported significant weight loss and functional decline. Contributing risk factors for dysphagia include cognitive deficits, poor oral hygiene & significant pulmonary co-morbidities placing him at  increased risk of aspiration.     Compensatory Strategies  Aspiration      Assessment:   Zohaib Wilson Sr. is a 72 y.o. male with an SLP diagnosis of suspected Dysphagia in the setting of dx septic shock, UTI with hx dementia, ADL dependence and significant pulmonary co- morbidities placing him at increased risk of dysphagia/aspiration.  He presents with adequate PATTI and ability to communicate basic needs, although hearing impaired and speech unintelligible at times.  Pt presents with productive cough with and without PO intake; however, audible wetness and increased cough frequency noted during bedside CSE. MBSS warranted as comprehensive swallowing assessment; however, pt/family would also benefit from ACP/goals of care discussion s/t significant comorbidities, progressive nature of dementia & increased level of assist/dependence required at this time.    Goals:   Multidisciplinary Problems       SLP Goals          Problem: SLP    Goal Priority Disciplines Outcome   SLP Goal     SLP    Description: 1.  Pt will consume least restrictive PO diet without incident.    2.  MBSS as comprehensive swallowing assessment                       Plan:     Patient to be seen:  2 x/week, 3 x/week   Plan of Care expires:  08/03/23  Plan of Care reviewed with:  patient   SLP Follow-Up:  Yes       Discharge recommendations:  Discharge Facility/Level of Care Needs: nursing facility, basic (Recommended for ACP/Goals of care discussion with pt/family and provider.)   Barriers to Discharge:  Decreased Care Giver Support    Time Tracking:     SLP Treatment Date:   07/27/23  Speech Start Time:  0930  Speech Stop Time:  1000     Speech Total Time (min):  30 min    Billable Minutes: Eval 15 minutes  and Eval Swallow and Oral Function 15 minutes    07/27/2023

## 2023-07-27 NOTE — PROGRESS NOTES
Patient is ready to quit smoking.  Pt request a nicotine patch, [place] RN notified and will obtain.  Pt request smoking cessation appointment.

## 2023-07-27 NOTE — ASSESSMENT & PLAN NOTE
Documented baseline dementia in PCP and HH notes  No noted medications  Discussion with son, he believes this is more of hearing impairment then true dementia  CT head without acute changes  Encephalopathy likely s/t sepsis

## 2023-07-27 NOTE — HPI
72 year old male with medical issues including combined chronic heart failure; chronic kidney disease; COPD/emphysema; DORIAN; dementia; hypercalcemia    Presented to ED on night of 7/26 after family found on floor conscious but disoriented to time, place, and events; difficulty following commands. Son reports that roommate stated fall out of bed just shortly before the son arrived and called EMS.    ED evaluation revealed lactate 3.0; wbc 13.9; potassium 6.0; creatinine 3.8 (baseline 1.6); calcium 12.6; Mag 0.7; ; troponin 0.162  UA with wbc 90 and 3+ leukocytes  CT head with no acute adverse findings    Treated with sepsis 30ml/kg IVF bolus 2.5L; rocephin IV; and K shift with lokelma, insulin/d50, bicarb  Hypotensive with MAP < 65 despite above  Started on pressor  Critical care contacted for ICU admission for septic shock

## 2023-07-27 NOTE — SUBJECTIVE & OBJECTIVE
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    Tobacco user        Past Surgical History:   Procedure Laterality Date    CATARACT EXTRACTION      LASER OF PROSTATE W/ GREEN LIGHT PVP      ROBOT-ASSISTED REPAIR OF UMBILICAL HERNIA USING DA MARC XI N/A 7/20/2022    Procedure: XI ROBOTIC REPAIR, HERNIA, UMBILICAL;  Surgeon: Justine Sharp DO;  Location: [facility] OR;  Service: General;  Laterality: N/A;    TRANSURETHRAL RESECTION OF PROSTATE         Review of patient's allergies indicates:   Allergen Reactions    Naproxen sodium Hives     Other reaction(s): hives    Iodine      Other reaction(s): hives       Family History    None       Tobacco Use    Smoking status: Every Day     Packs/day: 1.50     Years: 60.00     Pack years: 90.00     Types: Cigarettes     Start date: 1963    Smokeless tobacco: Never   Substance and Sexual Activity    Alcohol use: Not Currently    Drug use: Not Currently    Sexual activity: Not Currently         Review of Systems   Reason unable to perform ROS: limited, encephalopathy.   Constitutional:  Negative for fever.   Respiratory:  Positive for shortness of breath.    Cardiovascular:  Negative for chest pain.   Musculoskeletal: Negative.    Neurological:  Negative for headaches.   Objective:     Vital Signs (Most Recent):  Temp: 99.2 °F (37.3 °C) (07/26/23 2048)  Pulse: (!) 58 (07/26/23 2347)  Resp: 20 (07/26/23 2347)  BP: (!) 95/53 (07/26/23 2347)  SpO2: 100 % (07/26/23 2347) Vital Signs (24h Range):  Temp:  [99.2 °F (37.3 °C)] 99.2 °F (37.3 °C)  Pulse:  [54-80] 58  Resp:  [12-28] 20  SpO2:  [92 %-100 %] 100 %  BP: ()/() 95/53     Weight: 74 kg (163 lb 2.3 oz)  Body mass  index is 22.13 kg/m².      Intake/Output Summary (Last 24 hours) at 2023 2356  Last data filed at 2023 2146  Gross per 24 hour   Intake 2000 ml   Output --   Net 2000 ml      [START ON 2023] NORepinephrine bitartrate-D5W 0.06 mcg/kg/min (23 0015)          Physical Exam  Vitals and nursing note reviewed.   Constitutional:       General: He is awake. He is not in acute distress.     Appearance: He is normal weight. He is ill-appearing.      Comments: Unkempt, unclean appearance   HENT:      Head: Atraumatic.   Eyes:      Conjunctiva/sclera: Conjunctivae normal.   Neck:      Vascular: No JVD.   Cardiovascular:      Rate and Rhythm: Regular rhythm. Bradycardia present. FrequentExtrasystoles are present.     Pulses:           Radial pulses are 1+ on the right side and 1+ on the left side.        Dorsalis pedis pulses are 1+ on the right side and 1+ on the left side.   Pulmonary:      Effort: Prolonged expiration present. No accessory muscle usage or respiratory distress.      Breath sounds: Rhonchi present. No wheezing.   Abdominal:      General: Bowel sounds are decreased. There is no distension.      Palpations: Abdomen is soft.      Tenderness: There is no abdominal tenderness.   Musculoskeletal:      Right lower le+ Edema present.      Left lower le+ Edema present.   Skin:     General: Skin is warm and dry.      Capillary Refill: Capillary refill takes 2 to 3 seconds.   Neurological:      Mental Status: He is alert.      GCS: GCS eye subscore is 4. GCS verbal subscore is 4. GCS motor subscore is 6.   Psychiatric:         Attention and Perception: He is inattentive.         Behavior: Behavior is cooperative.         Cognition and Memory: Cognition is impaired.      Comments: Speech clear at times, other verbalization is garbled        Vents:       Lines/Drains/Airways       Drain  Duration                  Urethral Catheter 230 Double-lumen 16 Fr. <1 day              Peripheral  Intravenous Line  Duration                  Peripheral IV - Single Lumen 07/26/23 20 G Left Forearm <1 day         Peripheral IV - Single Lumen 07/26/23 2100 20 G Left Antecubital <1 day                    Significant Labs:    CBC/Anemia Profile:  Recent Labs   Lab 07/26/23 2057   WBC 13.92*   HGB 15.3   HCT 46.3      MCV 90   RDW 14.6*        Chemistries:  Recent Labs   Lab 07/26/23 2057 07/26/23 2251     --    K 6.0*  --      --    CO2 17*  --    BUN 83*  --    CREATININE 3.8*  --    CALCIUM 12.6*  --    ALBUMIN 2.9*  --    PROT 7.7  --    BILITOT 0.5  --    ALKPHOS 134  --    ALT 27  --    AST 42*  --    MG  --  <0.7*       ABGs:   Recent Labs   Lab 07/26/23 2135   PH 7.338*   PCO2 28.9*   HCO3 15.5*   POCSATURATED 96   BE -10     Lactic Acid:   Recent Labs   Lab 07/26/23 2057   LACTATE 3.0*     Troponin:   Recent Labs   Lab 07/26/23 2057   TROPONINI 0.162*     Urine Studies:   Recent Labs   Lab 07/26/23 2226   COLORU Yellow   APPEARANCEUA Clear   PHUR 7.0   SPECGRAV 1.010   PROTEINUA 1+*   GLUCUA 3+*   KETONESU Negative   BILIRUBINUA Negative   OCCULTUA 2+*   NITRITE Negative   UROBILINOGEN Negative   LEUKOCYTESUR 3+*   RBCUA 2   WBCUA 90*   BACTERIA None   HYALINECASTS 6*     All pertinent labs within the past 24 hours have been reviewed.    Significant Imaging:   I have reviewed all pertinent imaging results/findings within the past 24 hours.

## 2023-07-27 NOTE — PLAN OF CARE
Levophed continues. Speech eval and diet advanced. Hygiene assist done. Family updated. Safety maintained. Skin integrity maintained.

## 2023-07-27 NOTE — ASSESSMENT & PLAN NOTE
No documented MDRO or pseudomonas history  Continue q24h rocephin  Follow cultures to guide ongoing abx

## 2023-07-27 NOTE — PLAN OF CARE
Speech eval with diet ordered. Pressor continued. Knight . Bath and linen changed. Safety maintained. POC with family.

## 2023-07-27 NOTE — ASSESSMENT & PLAN NOTE
Per record, ongoing 1.5 pack/day tobacco smoker; 90 pack year history  Educate and encourage on cessation benefits and resources once mental status improved

## 2023-07-27 NOTE — H&P
[facility] - Emergency Dept.  Critical Care Medicine  History & Physical    Patient Name: Zohaib Wilson Sr.  MRN: [id number]  Admission Date: 7/26/2023  Hospital Length of Stay: 1 days  Code Status: Full Code  Attending Physician: Aquiles Lou MD   Primary Care Provider: Nikki Alfredo MD   Principal Problem: Septic shock    Subjective:     HPI:  72 year old male with medical issues including combined chronic heart failure; chronic kidney disease; COPD/emphysema; DORIAN; dementia; hypercalcemia    Presented to ED on night of 7/26 after family found on floor conscious but disoriented to time, place, and events; difficulty following commands. Last Known normal state and time on floor both unknown.  ED evaluation revealed lactate 3.0; wbc 13.9; potassium 6.0; creatinine 3.8 (baseline 1.6); calcium 12.6; Mag 0.7; ; troponin 0.162  UA with wbc 90 and 3+ leukocytes  CT head with no acute adverse findings    Treated with sepsis 30ml/kg IVF bolus 2.5L; rocephin IV; and K shift with lokelma, insulin/d50, bicarb  Hypotensive with MAP < 65 despite above  Started on pressor  Critical care contacted for ICU admission for septic shock      Hospital/ICU Course:  Seen and examined in ED. Awake and alert, oriented to person only. Speech clear at times and garbled at others.     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    Tobacco user        Past Surgical History:   Procedure Laterality Date    CATARACT EXTRACTION      LASER OF PROSTATE W/ GREEN LIGHT PVP      ROBOT-ASSISTED REPAIR OF UMBILICAL HERNIA USING DA MARC XI N/A 7/20/2022    Procedure: XI ROBOTIC REPAIR, HERNIA, UMBILICAL;  Surgeon: Justine Sharp DO;   Location: [facility] OR;  Service: General;  Laterality: N/A;    TRANSURETHRAL RESECTION OF PROSTATE         Review of patient's allergies indicates:   Allergen Reactions    Naproxen sodium Hives     Other reaction(s): hives    Iodine      Other reaction(s): hives       Family History    None       Tobacco Use    Smoking status: Every Day     Packs/day: 1.50     Years: 60.00     Pack years: 90.00     Types: Cigarettes     Start date: 1963    Smokeless tobacco: Never   Substance and Sexual Activity    Alcohol use: Not Currently    Drug use: Not Currently    Sexual activity: Not Currently         Review of Systems   Reason unable to perform ROS: limited, encephalopathy.   Constitutional:  Negative for fever.   Respiratory:  Positive for shortness of breath.    Cardiovascular:  Negative for chest pain.   Musculoskeletal: Negative.    Neurological:  Negative for headaches.   Objective:     Vital Signs (Most Recent):  Temp: 99.2 °F (37.3 °C) (07/26/23 2048)  Pulse: (!) 58 (07/26/23 2347)  Resp: 20 (07/26/23 2347)  BP: (!) 95/53 (07/26/23 2347)  SpO2: 100 % (07/26/23 2347) Vital Signs (24h Range):  Temp:  [99.2 °F (37.3 °C)] 99.2 °F (37.3 °C)  Pulse:  [54-80] 58  Resp:  [12-28] 20  SpO2:  [92 %-100 %] 100 %  BP: ()/() 95/53     Weight: 74 kg (163 lb 2.3 oz)  Body mass index is 22.13 kg/m².      Intake/Output Summary (Last 24 hours) at 7/26/2023 2356  Last data filed at 7/26/2023 2146  Gross per 24 hour   Intake 2000 ml   Output --   Net 2000 ml      [START ON 7/27/2023] NORepinephrine bitartrate-D5W 0.06 mcg/kg/min (07/27/23 0015)          Physical Exam  Vitals and nursing note reviewed.   Constitutional:       General: He is awake. He is not in acute distress.     Appearance: He is normal weight. He is ill-appearing.      Comments: Unkempt, unclean appearance   HENT:      Head: Atraumatic.   Eyes:      Conjunctiva/sclera: Conjunctivae normal.   Neck:      Vascular: No JVD.   Cardiovascular:      Rate and Rhythm:  Regular rhythm. Bradycardia present. FrequentExtrasystoles are present.     Pulses:           Radial pulses are 1+ on the right side and 1+ on the left side.        Dorsalis pedis pulses are 1+ on the right side and 1+ on the left side.   Pulmonary:      Effort: Prolonged expiration present. No accessory muscle usage or respiratory distress.      Breath sounds: Rhonchi present. No wheezing.   Abdominal:      General: Bowel sounds are decreased. There is no distension.      Palpations: Abdomen is soft.      Tenderness: There is no abdominal tenderness.   Musculoskeletal:      Right lower le+ Edema present.      Left lower le+ Edema present.   Skin:     General: Skin is warm and dry.      Capillary Refill: Capillary refill takes 2 to 3 seconds.   Neurological:      Mental Status: He is alert.      GCS: GCS eye subscore is 4. GCS verbal subscore is 4. GCS motor subscore is 6.   Psychiatric:         Attention and Perception: He is inattentive.         Behavior: Behavior is cooperative.         Cognition and Memory: Cognition is impaired.      Comments: Speech clear at times, other verbalization is garbled        Vents:       Lines/Drains/Airways       Drain  Duration                  Urethral Catheter 230 Double-lumen 16 Fr. <1 day              Peripheral Intravenous Line  Duration                  Peripheral IV - Single Lumen 23 20 G Left Forearm <1 day         Peripheral IV - Single Lumen 23 2100 20 G Left Antecubital <1 day                    Significant Labs:    CBC/Anemia Profile:  Recent Labs   Lab 23   WBC 13.92*   HGB 15.3   HCT 46.3      MCV 90   RDW 14.6*        Chemistries:  Recent Labs   Lab 23  2251     --    K 6.0*  --      --    CO2 17*  --    BUN 83*  --    CREATININE 3.8*  --    CALCIUM 12.6*  --    ALBUMIN 2.9*  --    PROT 7.7  --    BILITOT 0.5  --    ALKPHOS 134  --    ALT 27  --    AST 42*  --    MG  --  <0.7*        ABGs:   Recent Labs   Lab 07/26/23 2135   PH 7.338*   PCO2 28.9*   HCO3 15.5*   POCSATURATED 96   BE -10     Lactic Acid:   Recent Labs   Lab 07/26/23 2057   LACTATE 3.0*     Troponin:   Recent Labs   Lab 07/26/23 2057   TROPONINI 0.162*     Urine Studies:   Recent Labs   Lab 07/26/23 2226   COLORU Yellow   APPEARANCEUA Clear   PHUR 7.0   SPECGRAV 1.010   PROTEINUA 1+*   GLUCUA 3+*   KETONESU Negative   BILIRUBINUA Negative   OCCULTUA 2+*   NITRITE Negative   UROBILINOGEN Negative   LEUKOCYTESUR 3+*   RBCUA 2   WBCUA 90*   BACTERIA None   HYALINECASTS 6*     All pertinent labs within the past 24 hours have been reviewed.    Significant Imaging:   I have reviewed all pertinent imaging results/findings within the past 24 hours.    Assessment/Plan:     Neuro  Dementia, unspecified type with acute encephalopathy  Documented baseline dementia in PCP and HH notes  No noted medications  Discussion with son, he believes this is more of hearing impairment then true dementia  CT head without acute changes  Encephalopathy likely s/t sepsis      Pulmonary  COPD, group C, by GOLD 2013 classification  No evidence of acute exacerbation  Continue ICS, LABA maintenance nebs  Prn duoneb    Cardiac/Vascular  Elevated troponin  Likely s/t shock/demand ischemia  Trend troponin    Chronic combined systolic and diastolic congestive heart failure  Patient is identified as having Combined Systolic and Diastolic heart failure that is Chronic. CHF is currently controlled. Latest ECHO performed and demonstrates- Results for orders placed during the hospital encounter of 04/21/23  Echo  Interpretation Summary  · The left ventricle is normal in size with mild concentric hypertrophy and normal systolic function.  · Grade I left ventricular diastolic dysfunction.  · Normal right ventricular size with normal right ventricular systolic function.  · Intermediate central venous pressure (8 mmHg).  · The estimated ejection fraction is  55%.  monitor clinical status closely. Monitor on telemetry. Patient is off CHF pathway.    Monitor strict Is&Os and daily weights.    Continue to stress to patient importance of self efficacy and  on diet for CHF.   Last BNP reviewed- and noted below   Recent Labs   Lab 07/26/23  2256   BNP 85     -high risk for decompensation post sepsis IVF bolus, hold additional IVF  -hypotensive requiring pressor, hold entresto and metoprolol for now      Primary hypertension  Currently hypotensive on pressor; hold antihypertensives    Mixed hyperlipidemia  Continue statin    Renal/  Acute cystitis without hematuria  No documented MDRO or pseudomonas history  Continue q24h rocephin  Follow cultures to guide ongoing abx    Hypomagnesemia  Replace with 4g IV Mag  Repeat level in am for response to therapy    Hyperkalemia  Lokelma, bicarb, insulin/d50 given in ED  Monitor chem q4h until 5.0 or less    Acute renal failure superimposed on stage 3b chronic kidney disease  Baseline creatinine 1.6  Suspect worsened s/t sepsis, shock  S/p IVF resuscitation; given heart failure will hold additional IVF  Pressor to support MAP for goal 65 or greater  Monitor creatinine trend    ID  * Septic shock  Source - UTI  Blood and urine culture sent  Shock evidenced by MAP < 65, LISHA, encephalopathy  Review of perfusion post IVF bolus with ongoing shock evidenced by MAP < 65  Levophed ordered; titrate per order for goal MAP > 65  Source control - rocephin IV q24; monitor cultures to guide adjustments    Endocrine  Weight loss, abnormal  Noted in documentation, appears unintentional  25kg loss over one year  Will obtain RD dietary recs    Hypothyroid  Continue home dose levothyroxine    Other  DORIAN (obstructive sleep apnea)  Home CPAP (autoPAP 6-20) ordered   Will verify use and order CPAP at exp pressure 10 while inpt    Tobacco user  Per record, ongoing 1.5 pack/day tobacco smoker; 90 pack year history  Educate and encourage on cessation  benefits and resources once mental status improved      Critical Care Daily Checklist:    A: Awake: RASS Goal/Actual Goal:    Actual:     B: Spontaneous Breathing Trial Performed?     C: SAT & SBT Coordinated?  n/a                      D: Delirium: CAM-ICU     E: Early Mobility Performed? yes   F: Feeding Goal:    Status:     Current Diet Order   No orders of the defined types were placed in this encounter.      AS: Analgesia/Sedation No indication   T: Thromboembolic Prophylaxis heparin   H: HOB > 300 Yes   U: Stress Ulcer Prophylaxis (if needed) pepcid   G: Glucose Control monitor   B: Bowel Function     I: Indwelling Catheter (Lines & Knight) Necessity reviewed   D: De-escalation of Antimicrobials/Pharmacotherapies reviewed    Plan for the day/ETD As above    Code Status:  Family/Goals of Care: Full Code  Adult son and daughter are SDM  Goal is home on discharge with roommate/caregiver pending hospital course  No known advanced directives. Son believes he may have discussed DNR but unsure and will discuss further with his sister. Meanwhile I explained that we will order FULL CODE and I recommended given Mr Wilson current health state that they discuss the code status and let us know if change is desired.      Critical Care Time: 65 minutes  Critical secondary to septic shock on pressor support   Critical care was time spent personally by me on the following activities: development of treatment plan with patient or surrogate and bedside caregivers, discussions with consultants, evaluation of patient's response to treatment, examination of patient, ordering and performing treatments and interventions, ordering and review of laboratory studies, ordering and review of radiographic studies, pulse oximetry, re-evaluation of patient's condition. This critical care time did not overlap with that of any other provider or involve time for any procedures.     RISHI Jeffers-BC  Critical Care Medicine  [facility] - Emergency  Dept.

## 2023-07-27 NOTE — PLAN OF CARE
[facility] - Intensive Care (Hospital)  Initial Discharge Assessment       Primary Care Provider: Nikki Alfredo MD    Admission Diagnosis: Weakness [R53.1]  Septic shock [A41.9, R65.21]    Admission Date: 7/26/2023  Expected Discharge Date:     Transition of Care Barriers: None    Payor: [employer]ARE / Plan: STinser 65 / Product Type: Medicare Advantage /     Extended Emergency Contact Information  Primary Emergency Contact: Zohaib Wilson Jr  Mobile Phone: [phone]  Relation: Son  Secondary Emergency Contact: Fidelia Lutz  Mobile Phone: [phone]  Relation: Daughter    Discharge Plan A: Skilled Nursing Facility  Discharge Plan B: [id number] - [place] - [address] OF [facility] [address]  [address]  [place] [address]  Phone: [phone] Fax: [phone]      Initial Assessment (most recent)       Adult Discharge Assessment - 07/27/23 1038          Discharge Assessment    Assessment Type Discharge Planning Assessment     Confirmed/corrected address, phone number and insurance Yes     Confirmed Demographics Correct on Facesheet     Source of Information patient     When was your last doctors appointment? 05/24/23     Communicated LINDSAY with patient/caregiver Date not available/Unable to determine     Reason For Admission Septic Shock     People in Home friend(s)     Facility Arrived From: Home     Do you expect to return to your current living situation? No     Do you have help at home or someone to help you manage your care at home? Yes     Who are your caregiver(s) and their phone number(s)? Adult children, Zohaib and Fidelia but both work during the day     Prior to hospitilization cognitive status: Alert/Oriented     Current cognitive status: Not Oriented to Time     Walking or Climbing Stairs ambulation difficulty, requires equipment     Mobility Management rollator     Dressing/Bathing bathing difficulty,  assistance 1 person     Dressing/Bathing Management assist x 1     Home Accessibility stairs to enter home     Number of Stairs, Main Entrance five     Stair Railings, Main Entrance railings safe and in good condition     Equipment Currently Used at Home wheelchair;rollator;bedside commode     Readmission within 30 days? No     Patient currently being followed by outpatient case management? No     Do you currently have service(s) that help you manage your care at home? No     Do you take prescription medications? Yes     Do you have prescription coverage? Yes     Coverage MCR     Do you have any problems affording any of your prescribed medications? No     Is the patient taking medications as prescribed? yes     Who is going to help you get home at discharge? family     How do you get to doctors appointments? family or friend will provide     Are you on dialysis? No     Do you take coumadin? No     Discharge Plan A Skilled Nursing Facility     Discharge Plan B Home Health     DME Needed Upon Discharge  none     Discharge Plan discussed with: Patient     Transition of Care Barriers None                   Anticipated DC dispo: SNF vs HH   Prior Level of Function: dependent with ADLs, uses rollator for ambulation, unable to bathe himself  People in home: roommate/friendAntonio     Comments:  CM met with patient at bedside to introduce role and discuss discharge planning. Adult children will be limited help at home and can provide transport at time of discharge.CM discharge needs depends on hospital progress. CM will continue following to assist with other needs.

## 2023-07-27 NOTE — HOSPITAL COURSE
7/27:   Seen and examined in ED. Awake and alert, oriented to person only. Speech clear at times and garbled at others.  7/28:   Off norepi since 2am.  Ur culture with GNR 10-50k CFU.  CTX.

## 2023-07-27 NOTE — ED PROVIDER NOTES
SCRIBE #1 NOTE: I, Cassidy Cage, am scribing for, and in the presence of, Karla Patel MD. I have scribed the entire note.       History     Chief Complaint   Patient presents with    Altered Mental Status     Pt to ED from home via [facility]. Pt found on floor next to bed, unk how long on floor. Pt consc but disoriented to time, place, events. HX dementia. Pt unkempt and poor historian. Difficulty following commands or answering questions. LKN?     Review of patient's allergies indicates:   Allergen Reactions    Naproxen sodium Hives     Other reaction(s): hives    Iodine      Other reaction(s): hives         History of Present Illness     HPI    7/26/2023, 8:21 PM  History obtained from [facility]    HPI and ROS limited due to acuity of condition      History of Present Illness: Zohaib Wilson Sr. is a 72 y.o. male patient with a PMHx of CKD, COPD, hyperlipidemia, and PVD who presents to the Emergency Department for evaluation of AMS. Symptoms are constant and moderate in severity. [facility] was called after pt was found on the floor next to a bed. It is unknown how long he was there for or what his last known normal was. Pt has dementia, COPD, and is a smoker. He lives with a family member. Pt is conscious but disoriented at bedside. No further complaints or concerns at this time.       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    Social History:  Social History     Tobacco Use    Smoking status: Every Day     Packs/day: 1.50     Years: 60.00     Pack years: 90.00     Types: Cigarettes     Start date: 1963    Smokeless tobacco: Never   Substance and Sexual Activity    Alcohol use: Not Currently    Drug use: Not Currently    Sexual activity: Not Currently        Review of Systems     Review of Systems   Unable to perform ROS: Acuity of condition   Psychiatric/Behavioral:  Positive for confusion.       Physical Exam     Initial Vitals   BP Pulse Resp Temp SpO2   07/26/23 1952 07/26/23 1952 07/26/23 1952 07/26/23 2048 07/26/23 1952   129/89 80 12 99.2 °F (37.3 °C) 98 %      MAP       --                   Physical Exam  Nursing Notes and Vital Signs Reviewed.  Constitutional: Patient is in no apparent distress. Unkempt.  Head: Atraumatic. Normocephalic.  Eyes: PERRL. EOM intact. Conjunctivae are not pale. No scleral icterus.  ENT: Mucous membranes are dry. Oropharynx is clear and symmetric.    Neck: Supple. Full ROM. No lymphadenopathy.  Cardiovascular: Regular rate. Regular rhythm. No murmurs, rubs, or gallops. Distal pulses are 2+ and symmetric. Scattered wheezing  Pulmonary/Chest: No respiratory distress. Clear to auscultation bilaterally. No wheezing or rales.  Abdominal: Soft and non-distended.  There is no tenderness.  No rebound, guarding, or rigidity. Good bowel sounds.  Genitourinary: No CVA tenderness  Musculoskeletal: Moves all extremities. No obvious deformities. No edema. No calf tenderness.  Skin: Skin breakdown on legs and sacrum region  Neurological: Aware to person. Opens eyes but is disoriented. GCS 14  Psychiatric: Unable to assess     ED Course   Critical  Care    Date/Time: 7/26/2023 11:23 PM  Performed by: Karla Patel MD  Authorized by: Karla Patel MD   Direct patient critical care time: 16 minutes  Ordering / reviewing critical care time: 3 minutes  Documentation critical care time: 5 minutes  Consulting other physicians critical care time: 3 minutes  Other critical care time: 10 minutes  Total critical care time (exclusive of procedural time) : 37 minutes  Critical care time was exclusive of separately billable procedures and treating other patients and teaching time.  Critical care was necessary to treat or prevent imminent or life-threatening deterioration of the following conditions: LISHA, hypokalemia, hypotension, sepsis.  Critical care was time spent personally by me on the following activities: blood draw for specimens, discussions with consultants, interpretation of cardiac output measurements, evaluation of patient's response to treatment, examination of patient, obtaining history from patient or surrogate, ordering and performing treatments and interventions, ordering and review of laboratory studies, ordering and review of radiographic studies, pulse oximetry, re-evaluation of patient's condition, review of old charts and development of treatment plan with patient or surrogate.      Central Line    Date/Time: 7/27/2023 12:40 AM  Performed by: Karla Patel MD  Authorized by: Aquiles Lou MD     Location procedure was performed:  [facility] EMERGENCY DEPARTMENT  Pre-operative diagnosis:  Sepsis  Post-operative diagnosis:  Sepsis  Consent Done ?:  Yes  Time out complete?: Verified correct patient, procedure, equipment, staff, and site/side    Indications:  Med administration  Anesthesia:  Local infiltration  Local anesthetic:  Lidocaine 1% without epinephrine  Anesthetic total (ml):  3  Preparation:  Skin prepped with ChloraPrep  Skin prep agent dried: Skin prep agent completely dried prior to procedure    Sterile barriers: All five maximal sterile  barriers used - gloves, gown, cap, mask and large sterile sheet    Hand hygiene: Hand hygiene performed immediately prior to central venous catheter insertion    Location:  Right internal jugular  Catheter type:  Triple lumen  Catheter size:  7 Fr  Inserted Catheter Length (cm):  16  Ultrasound guidance: Yes    Vessel Caliber:  Large   patent  Comprressibility:  Normal  Needle advanced into vessel with real time ultrasound guidance.    Guidewire confirmed in vessel.    Steril sheath on probe.    Sterile gel used.  Manometry: No    Number of attempts:  1  Securement:  Line sutured, chlorhexidine patch, sterile dressing applied and blood return through all ports  Complications: No    Estimated blood loss (mL):  5  Specimens: No    Implants: No    XRay:  Placement verified by x-ray  Adverse Events:  NoneTermination Site: superior vena cava  ED Vital Signs:  Vitals:    07/27/23 0102 07/27/23 0146 07/27/23 0215 07/27/23 0230   BP: (!) 113/52 (!) 94/52 (!) 101/58 (!) 78/51   Pulse: (!) 55 60 73 (!) 54   Resp: 16 15 (!) 28 (!) 25   Temp:  97 °F (36.1 °C) 97.6 °F (36.4 °C)    TempSrc:  Axillary     SpO2: 99% 100% 95% 100%   Weight:       Height:        07/27/23 0245 07/27/23 0300 07/27/23 0305 07/27/23 0315   BP: (!) 108/53 (!) 108/56 (!) 108/56 (!) 98/55   Pulse: (!) 58 (!) 58 65 (!) 58   Resp: 16 18 (!) 26 20   Temp:   97.6 °F (36.4 °C)    TempSrc:       SpO2: 100% 96% 97% 96%   Weight:       Height:        07/27/23 0330 07/27/23 0345 07/27/23 0400 07/27/23 0415   BP: (!) 119/58 (!) 96/57 (!) 107/59 115/62   Pulse: (!) 59 61 (!) 57 (!) 57   Resp: 20 (!) 24 19 (!) 21   Temp:       TempSrc:       SpO2: 97% 95% 95% 97%   Weight:       Height:        07/27/23 0430 07/27/23 0445 07/27/23 0500   BP: (!) 124/58 (!) 112/54 (!) 113/58   Pulse: (!) 58 61 (!) 55   Resp: (!) 22 20 20   Temp:      TempSrc:      SpO2: 97% 97% (!) 94%   Weight:      Height:          Abnormal Lab Results:  Labs Reviewed   CBC W/ AUTO DIFFERENTIAL -  Abnormal; Notable for the following components:       Result Value    WBC 13.92 (*)     RDW 14.6 (*)     Immature Granulocytes 2.4 (*)     Gran # (ANC) 10.9 (*)     Immature Grans (Abs) 0.33 (*)     Mono # 1.2 (*)     Gran % 78.5 (*)     Lymph % 10.4 (*)     All other components within normal limits    Narrative:     Release to patient->Immediate   COMPREHENSIVE METABOLIC PANEL - Abnormal; Notable for the following components:    Potassium 6.0 (*)     CO2 17 (*)     BUN 83 (*)     Creatinine 3.8 (*)     Calcium 12.6 (*)     Albumin 2.9 (*)     AST 42 (*)     eGFR 16 (*)     All other components within normal limits    Narrative:     Release to patient->Immediate  CA   critical result(s) called and verbal readback obtained from   ITALO BAIG RN ED by [facility] 07/26/2023 21:37   CK - Abnormal; Notable for the following components:     (*)     All other components within normal limits    Narrative:     Release to patient->Immediate   LACTIC ACID, PLASMA - Abnormal; Notable for the following components:    Lactate (Lactic Acid) 3.0 (*)     All other components within normal limits    Narrative:     Release to patient->Immediate   TROPONIN I - Abnormal; Notable for the following components:    Troponin I 0.162 (*)     All other components within normal limits    Narrative:     Release to patient->Immediate   URINALYSIS, REFLEX TO URINE CULTURE - Abnormal; Notable for the following components:    Protein, UA 1+ (*)     Glucose, UA 3+ (*)     Occult Blood UA 2+ (*)     Leukocytes, UA 3+ (*)     All other components within normal limits    Narrative:     Specimen Source->Urine   MAGNESIUM - Abnormal; Notable for the following components:    Magnesium <0.7 (*)     All other components within normal limits    Narrative:     mg   critical result(s) called and verbal readback obtained from   Will Shaffer RN by [facility] 07/26/2023 23:26   URINALYSIS MICROSCOPIC - Abnormal; Notable for the following components:    WBC, UA 90 (*)      WBC Clumps, UA Few (*)     Hyaline Casts, UA 6 (*)     All other components within normal limits    Narrative:     Specimen Source->Urine   BASIC METABOLIC PANEL - Abnormal; Notable for the following components:    Chloride 112 (*)     CO2 17 (*)     Glucose 116 (*)     BUN 76 (*)     Creatinine 3.3 (*)     Calcium 10.7 (*)     eGFR 19 (*)     All other components within normal limits   TROPONIN I - Abnormal; Notable for the following components:    Troponin I 0.111 (*)     All other components within normal limits   ISTAT PROCEDURE - Abnormal; Notable for the following components:    POC PH 7.338 (*)     POC PCO2 28.9 (*)     POC HCO3 15.5 (*)     All other components within normal limits   POCT GLUCOSE - Abnormal; Notable for the following components:    POCT Glucose 41 (*)     All other components within normal limits   CULTURE, BLOOD   CULTURE, BLOOD   CULTURE, URINE   CULTURE, METHICILLIN-RESISTANT STAPHYLOCOCCUS AUREUS   HIV 1 / 2 ANTIBODY    Narrative:     Release to patient->Immediate   HEPATITIS C ANTIBODY    Narrative:     Release to patient->Immediate   B-TYPE NATRIURETIC PEPTIDE   B-TYPE NATRIURETIC PEPTIDE    Narrative:     Release to patient->Immediate   HEP C VIRUS HOLD SPECIMEN   POCT GLUCOSE   POCT GLUCOSE   POCT GLUCOSE MONITORING CONTINUOUS        All Lab Results:  Results for orders placed or performed during the hospital encounter of 07/26/23   HIV 1/2 Ag/Ab (4th Gen)   Result Value Ref Range    HIV 1/2 Ag/Ab Negative Negative   Hepatitis C Antibody   Result Value Ref Range    Hepatitis C Ab Negative Negative   CBC auto differential   Result Value Ref Range    WBC 13.92 (H) 3.90 - 12.70 K/uL    RBC 5.12 4.60 - 6.20 M/uL    Hemoglobin 15.3 14.0 - 18.0 g/dL    Hematocrit 46.3 40.0 - 54.0 %    MCV 90 82 - 98 fL    MCH 29.9 27.0 - 31.0 pg    MCHC 33.0 32.0 - 36.0 g/dL    RDW 14.6 (H) 11.5 - 14.5 %    Platelets 197 150 - 450 K/uL    MPV 11.2 9.2 - 12.9 fL    Immature Granulocytes 2.4 (H) 0.0 - 0.5  %    Gran # (ANC) 10.9 (H) 1.8 - 7.7 K/uL    Immature Grans (Abs) 0.33 (H) 0.00 - 0.04 K/uL    Lymph # 1.5 1.0 - 4.8 K/uL    Mono # 1.2 (H) 0.3 - 1.0 K/uL    Eos # 0.0 0.0 - 0.5 K/uL    Baso # 0.04 0.00 - 0.20 K/uL    nRBC 0 0 /100 WBC    Gran % 78.5 (H) 38.0 - 73.0 %    Lymph % 10.4 (L) 18.0 - 48.0 %    Mono % 8.3 4.0 - 15.0 %    Eosinophil % 0.1 0.0 - 8.0 %    Basophil % 0.3 0.0 - 1.9 %    Differential Method Automated    Comprehensive metabolic panel   Result Value Ref Range    Sodium 138 136 - 145 mmol/L    Potassium 6.0 (H) 3.5 - 5.1 mmol/L    Chloride 107 95 - 110 mmol/L    CO2 17 (L) 23 - 29 mmol/L    Glucose 101 70 - 110 mg/dL    BUN 83 (H) 8 - 23 mg/dL    Creatinine 3.8 (H) 0.5 - 1.4 mg/dL    Calcium 12.6 (HH) 8.7 - 10.5 mg/dL    Total Protein 7.7 6.0 - 8.4 g/dL    Albumin 2.9 (L) 3.5 - 5.2 g/dL    Total Bilirubin 0.5 0.1 - 1.0 mg/dL    Alkaline Phosphatase 134 55 - 135 U/L    AST 42 (H) 10 - 40 U/L    ALT 27 10 - 44 U/L    eGFR 16 (A) >60 mL/min/1.73 m^2    Anion Gap 14 8 - 16 mmol/L   CPK   Result Value Ref Range     (H) 20 - 200 U/L   Lactic acid, plasma   Result Value Ref Range    Lactate (Lactic Acid) 3.0 (H) 0.5 - 2.2 mmol/L   Troponin I   Result Value Ref Range    Troponin I 0.162 (H) 0.000 - 0.026 ng/mL   Urinalysis, Reflex to Urine Culture Urine, Clean Catch    Specimen: Urine   Result Value Ref Range    Specimen UA Urine, Clean Catch     Color, UA Yellow Yellow, Straw, Nuha    Appearance, UA Clear Clear    pH, UA 7.0 5.0 - 8.0    Specific Gravity, UA 1.010 1.005 - 1.030    Protein, UA 1+ (A) Negative    Glucose, UA 3+ (A) Negative    Ketones, UA Negative Negative    Bilirubin (UA) Negative Negative    Occult Blood UA 2+ (A) Negative    Nitrite, UA Negative Negative    Urobilinogen, UA Negative <2.0 EU/dL    Leukocytes, UA 3+ (A) Negative   BNP   Result Value Ref Range    BNP 85 0 - 99 pg/mL   Magnesium   Result Value Ref Range    Magnesium <0.7 (LL) 1.6 - 2.6 mg/dL   Urinalysis Microscopic    Result Value Ref Range    RBC, UA 2 0 - 4 /hpf    WBC, UA 90 (H) 0 - 5 /hpf    WBC Clumps, UA Few (A) None-Rare    Bacteria None None-Occ /hpf    Yeast, UA None None    Hyaline Casts, UA 6 (A) 0-1/lpf /lpf    Microscopic Comment SEE COMMENT    Basic metabolic panel   Result Value Ref Range    Sodium 141 136 - 145 mmol/L    Potassium 4.2 3.5 - 5.1 mmol/L    Chloride 112 (H) 95 - 110 mmol/L    CO2 17 (L) 23 - 29 mmol/L    Glucose 116 (H) 70 - 110 mg/dL    BUN 76 (H) 8 - 23 mg/dL    Creatinine 3.3 (H) 0.5 - 1.4 mg/dL    Calcium 10.7 (H) 8.7 - 10.5 mg/dL    Anion Gap 12 8 - 16 mmol/L    eGFR 19 (A) >60 mL/min/1.73 m^2   Troponin I   Result Value Ref Range    Troponin I 0.111 (H) 0.000 - 0.026 ng/mL   Basic metabolic panel   Result Value Ref Range    Sodium 141 136 - 145 mmol/L    Potassium 4.1 3.5 - 5.1 mmol/L    Chloride 112 (H) 95 - 110 mmol/L    CO2 17 (L) 23 - 29 mmol/L    Glucose 133 (H) 70 - 110 mg/dL    BUN 77 (H) 8 - 23 mg/dL    Creatinine 3.2 (H) 0.5 - 1.4 mg/dL    Calcium 11.1 (H) 8.7 - 10.5 mg/dL    Anion Gap 12 8 - 16 mmol/L    eGFR 20 (A) >60 mL/min/1.73 m^2   Lactic Acid, Plasma   Result Value Ref Range    Lactate (Lactic Acid) 1.3 0.5 - 2.2 mmol/L   Lactic Acid, Plasma   Result Value Ref Range    Lactate (Lactic Acid) 1.2 0.5 - 2.2 mmol/L   Troponin I   Result Value Ref Range    Troponin I 0.120 (H) 0.000 - 0.026 ng/mL   ISTAT PROCEDURE   Result Value Ref Range    POC PH 7.338 (L) 7.35 - 7.45    POC PCO2 28.9 (LL) 35 - 45 mmHg    POC PO2 85 80 - 100 mmHg    POC HCO3 15.5 (L) 24 - 28 mmol/L    POC BE -10 -2 to 2 mmol/L    POC SATURATED O2 96 95 - 100 %    Sample ARTERIAL     Site RR     Allens Test Pass     DelSys Nasal Can     Mode SPONT     Flow 2.5     Sp02 97    POCT glucose   Result Value Ref Range    POCT Glucose 88 70 - 110 mg/dL   POCT glucose   Result Value Ref Range    POCT Glucose 41 (LL) 70 - 110 mg/dL   POCT glucose   Result Value Ref Range    POCT Glucose 104 70 - 110 mg/dL   POCT glucose    Result Value Ref Range    POCT Glucose 117 (H) 70 - 110 mg/dL        Imaging Results:  Imaging Results              X-Ray Chest 1 View for Line/Tube Placement (In process)                      X-Ray Chest AP Portable (Final result)  Result time 07/26/23 21:44:37      Final result by Lashanda Ramsay MD (07/26/23 21:44:37)                   Impression:      Similar appearance of the chest, no adverse finding      Electronically signed by: Lashanda Ramsay  Date:    07/26/2023  Time:    21:44               Narrative:    EXAMINATION:  XR CHEST AP PORTABLE    CLINICAL HISTORY:  Weakness    TECHNIQUE:  Single frontal portable view of the chest was performed.    COMPARISON:  04/21/2023    FINDINGS:  Bilateral pulmonary interstitial opacity similar to prior exam.  No sizable pleural effusion or convincing pneumothorax                                       CT Head Without Contrast (Final result)  Result time 07/26/23 20:10:20      Final result by Lashanda Ramsay MD (07/26/23 20:10:20)                   Impression:      No acute or adverse finding as visualized motion degradation      Electronically signed by: Lashanda Ramsay  Date:    07/26/2023  Time:    20:10               Narrative:    EXAMINATION:  CT HEAD WITHOUT CONTRAST    CLINICAL HISTORY:  Head trauma, minor (Age >= 65y);    TECHNIQUE:  Low dose axial images were obtained through the head.  Coronal and sagittal reformations were also performed. Contrast was not administered.    COMPARISON:  04/21/2023    FINDINGS:  No acute intracranial hemorrhage or mass effect seen as visualized with motion degradation.  Chronic findings similar to previous.  No acute or adverse bony finding                                       The EKG was ordered, reviewed, and independently interpreted by the ED provider.  Interpretation time: 20:44  Rate: 72 BPM  Rhythm:  Sinus rhythm with premature atrial complexes  Interpretation: Left axis deviation.   Low voltage QRS.    Possible Lateral infarct, age undetermined.   Inferior infarct, age undetermined. No STEMI.             The Emergency Provider reviewed the vital signs and test results, which are outlined above.     ED Discussion     11:36 PM: Discussed case with TERESA Jeffers (Critical Care Medicine). Dr. Lou agrees with current care and management of pt and accepts admission.   Admitting Service: Critical Care Medicine  Admitting Physician: Dr. Lou  Admit to: ICU    11:37 PM: Re-evaluated pt. I have discussed test results, shared treatment plan, and the need for admission with patient and family at bedside. Pt and family express understanding at this time and agree with all information. All questions answered. Pt and family have no further questions or concerns at this time. Pt is ready for admit.         Medical Decision Making:   Clinical Tests:   Lab Tests: Ordered and Reviewed  Radiological Study: Ordered and Reviewed  Medical Tests: Ordered and Reviewed         ED Medication(s):  Medications   NORepinephrine 4 mg in dextrose 5% 250 mL infusion (premix) (titrating) (0.1 mcg/kg/min × 74 kg Intravenous Verify Only 7/27/23 0500)   mupirocin 2 % ointment (has no administration in time range)   cefTRIAXone (ROCEPHIN) 1 g in dextrose 5 % in water (D5W) 5 % 100 mL IVPB (MB+) (has no administration in time range)   famotidine tablet 20 mg (has no administration in time range)   atorvastatin tablet 40 mg (has no administration in time range)   levothyroxine tablet 25 mcg (25 mcg Oral Given 7/27/23 0504)   budesonide nebulizer solution 0.5 mg (has no administration in time range)   arformoteroL nebulizer solution 15 mcg (has no administration in time range)   albuterol-ipratropium 2.5 mg-0.5 mg/3 mL nebulizer solution 3 mL (has no administration in time range)   sodium chloride 0.9% bolus 1,000 mL 1,000 mL (0 mLs Intravenous Stopped 7/26/23 4146)   sodium chloride 0.9% bolus 1,000 mL 1,000 mL (0 mLs Intravenous Stopped  7/26/23 2146)   albuterol nebulizer solution 10 mg (10 mg Nebulization Given 7/26/23 2222)   dextrose 50% injection 25 g (25 g Intravenous Given 7/26/23 2154)   insulin regular injection 5 Units 0.05 mL (5 Units Intravenous Given 7/26/23 2154)   sodium bicarbonate solution 50 mEq (50 mEq Intravenous Given 7/26/23 2148)   sodium zirconium cyclosilicate packet 10 g (10 g Oral Given 7/26/23 2149)   0.9%  NaCl infusion ( Intravenous Canceled Entry 7/27/23 0225)   cefTRIAXone (ROCEPHIN) 1 g in dextrose 5 % in water (D5W) 5 % 100 mL IVPB (MB+) (0 g Intravenous Stopped 7/27/23 0154)   magnesium sulfate 2g in water 50mL IVPB (premix) (0 g Intravenous Stopped 7/27/23 0249)       Current Discharge Medication List                  Scribe Attestation:   Scribe #1: I performed the above scribed service and the documentation accurately describes the services I performed. I attest to the accuracy of the note.     Attending:   Physician Attestation Statement for Scribe #1: I, Karla Patel MD, personally performed the services described in this documentation, as scribed by Cassidy Cage, in my presence, and it is both accurate and complete.           Clinical Impression       ICD-10-CM ICD-9-CM   1. Weakness  R53.1 780.79   2. Septic shock  A41.9 038.9    R65.21 785.52     995.92       Disposition:   Disposition: Admitted  Condition: Critical      Karla Patel MD  07/27/23 7482

## 2023-07-27 NOTE — ASSESSMENT & PLAN NOTE
Noted in documentation, appears unintentional  25kg loss over one year  Will obtain RD dietary recs

## 2023-07-27 NOTE — PLAN OF CARE
Nutrition Recs: 7/27  1. Recommend pt diet order be advanced to Cardiac; Low Na; 500 mL + Total output diet when medically appropriate.   2. Recommend to offer pt Boost plus TID if needed.  3. Recommend pt to receive Pal BID to assist with skin tear when medical appropriate.   4. Recommend pt receives feeding assistants/ PO intake encouragement during all meals.   5.  Recommend to consider initiation onto alternative nutrition support if pt diet order can not be advanced within 24 hrs.   6. Recommend to perform NFPE during RD follow up.   7. Weigh daily.    Goals:   1. Pt diet order will be advanced within 24 hrs.   2. Pt will consume >50% of EEN and EPN prior to RD follow up.   3. Pt will receive and consume >75% of pal prior to RD follow up.   4. NFPE will be performed during RD follow up.  Nutrition Goal Status: new  Communication of RD Recs: other (comment) (POC: Sticky Note)  Ambrose Steinberg, Registration Eligible, Provisional LDN

## 2023-07-28 VITALS
HEIGHT: 72 IN | HEART RATE: 86 BPM | DIASTOLIC BLOOD PRESSURE: 63 MMHG | WEIGHT: 171.06 LBS | RESPIRATION RATE: 18 BRPM | BODY MASS INDEX: 23.17 KG/M2 | SYSTOLIC BLOOD PRESSURE: 94 MMHG | TEMPERATURE: 98 F | OXYGEN SATURATION: 96 %

## 2023-07-28 PROBLEM — E87.5 HYPERKALEMIA: Status: RESOLVED | Noted: 2023-07-27 | Resolved: 2023-07-28

## 2023-07-28 PROBLEM — E83.42 HYPOMAGNESEMIA: Status: RESOLVED | Noted: 2023-07-27 | Resolved: 2023-07-28

## 2023-07-28 PROBLEM — A41.9 SEPTIC SHOCK: Status: RESOLVED | Noted: 2023-07-27 | Resolved: 2023-07-28

## 2023-07-28 PROBLEM — R65.21 SEPTIC SHOCK: Status: RESOLVED | Noted: 2023-07-27 | Resolved: 2023-07-28

## 2023-07-28 LAB
ANION GAP SERPL CALC-SCNC: 9 MMOL/L (ref 8–16)
BACTERIA UR CULT: ABNORMAL
BASOPHILS # BLD AUTO: 0.02 K/UL (ref 0–0.2)
BASOPHILS NFR BLD: 0.2 % (ref 0–1.9)
BUN SERPL-MCNC: 56 MG/DL (ref 8–23)
CALCIUM SERPL-MCNC: 9.1 MG/DL (ref 8.7–10.5)
CHLORIDE SERPL-SCNC: 111 MMOL/L (ref 95–110)
CO2 SERPL-SCNC: 20 MMOL/L (ref 23–29)
CREAT SERPL-MCNC: 2.4 MG/DL (ref 0.5–1.4)
DIFFERENTIAL METHOD: ABNORMAL
EOSINOPHIL # BLD AUTO: 0.1 K/UL (ref 0–0.5)
EOSINOPHIL NFR BLD: 1.3 % (ref 0–8)
ERYTHROCYTE [DISTWIDTH] IN BLOOD BY AUTOMATED COUNT: 14.5 % (ref 11.5–14.5)
EST. GFR  (NO RACE VARIABLE): 28 ML/MIN/1.73 M^2
GLUCOSE SERPL-MCNC: 71 MG/DL (ref 70–110)
HCT VFR BLD AUTO: 36.1 % (ref 40–54)
HGB BLD-MCNC: 12.1 G/DL (ref 14–18)
IMM GRANULOCYTES # BLD AUTO: 0.08 K/UL (ref 0–0.04)
IMM GRANULOCYTES NFR BLD AUTO: 0.9 % (ref 0–0.5)
LYMPHOCYTES # BLD AUTO: 1.6 K/UL (ref 1–4.8)
LYMPHOCYTES NFR BLD: 18.5 % (ref 18–48)
MAGNESIUM SERPL-MCNC: 2.2 MG/DL (ref 1.6–2.6)
MCH RBC QN AUTO: 30.3 PG (ref 27–31)
MCHC RBC AUTO-ENTMCNC: 33.5 G/DL (ref 32–36)
MCV RBC AUTO: 90 FL (ref 82–98)
MONOCYTES # BLD AUTO: 0.7 K/UL (ref 0.3–1)
MONOCYTES NFR BLD: 8.6 % (ref 4–15)
NEUTROPHILS # BLD AUTO: 6 K/UL (ref 1.8–7.7)
NEUTROPHILS NFR BLD: 70.5 % (ref 38–73)
NRBC BLD-RTO: 0 /100 WBC
PLATELET # BLD AUTO: 185 K/UL (ref 150–450)
PMV BLD AUTO: 10.7 FL (ref 9.2–12.9)
POCT GLUCOSE: 71 MG/DL (ref 70–110)
POCT GLUCOSE: 73 MG/DL (ref 70–110)
POCT GLUCOSE: 80 MG/DL (ref 70–110)
POCT GLUCOSE: 81 MG/DL (ref 70–110)
POTASSIUM SERPL-SCNC: 3.8 MMOL/L (ref 3.5–5.1)
RBC # BLD AUTO: 4 M/UL (ref 4.6–6.2)
SODIUM SERPL-SCNC: 140 MMOL/L (ref 136–145)
WBC # BLD AUTO: 8.53 K/UL (ref 3.9–12.7)

## 2023-07-28 PROCEDURE — 99233 SBSQ HOSP IP/OBS HIGH 50: CPT | Mod: ,,, | Performed by: INTERNAL MEDICINE

## 2023-07-28 PROCEDURE — 97530 THERAPEUTIC ACTIVITIES: CPT

## 2023-07-28 PROCEDURE — 92507 TX SP LANG VOICE COMM INDIV: CPT

## 2023-07-28 PROCEDURE — 63600175 PHARM REV CODE 636 W HCPCS: Performed by: INTERNAL MEDICINE

## 2023-07-28 PROCEDURE — 94640 AIRWAY INHALATION TREATMENT: CPT

## 2023-07-28 PROCEDURE — 97166 OT EVAL MOD COMPLEX 45 MIN: CPT

## 2023-07-28 PROCEDURE — 83735 ASSAY OF MAGNESIUM: CPT | Performed by: NURSE PRACTITIONER

## 2023-07-28 PROCEDURE — 85025 COMPLETE CBC W/AUTO DIFF WBC: CPT | Performed by: NURSE PRACTITIONER

## 2023-07-28 PROCEDURE — 25000242 PHARM REV CODE 250 ALT 637 W/ HCPCS: Performed by: NURSE PRACTITIONER

## 2023-07-28 PROCEDURE — S4991 NICOTINE PATCH NONLEGEND: HCPCS | Performed by: INTERNAL MEDICINE

## 2023-07-28 PROCEDURE — 97162 PT EVAL MOD COMPLEX 30 MIN: CPT

## 2023-07-28 PROCEDURE — 25000003 PHARM REV CODE 250: Performed by: NURSE PRACTITIONER

## 2023-07-28 PROCEDURE — 80048 BASIC METABOLIC PNL TOTAL CA: CPT | Performed by: NURSE PRACTITIONER

## 2023-07-28 PROCEDURE — 99233 PR SUBSEQUENT HOSPITAL CARE,LEVL III: ICD-10-PCS | Mod: ,,, | Performed by: INTERNAL MEDICINE

## 2023-07-28 PROCEDURE — 92526 ORAL FUNCTION THERAPY: CPT

## 2023-07-28 PROCEDURE — 25000003 PHARM REV CODE 250: Performed by: INTERNAL MEDICINE

## 2023-07-28 PROCEDURE — 94761 N-INVAS EAR/PLS OXIMETRY MLT: CPT

## 2023-07-28 RX ORDER — CEPHALEXIN 500 MG/1
500 CAPSULE ORAL EVERY 12 HOURS
Qty: 10 CAPSULE | Refills: 0 | Status: SHIPPED | OUTPATIENT
Start: 2023-07-28 | End: 2023-08-02

## 2023-07-28 RX ADMIN — BUDESONIDE 0.5 MG: 0.5 INHALANT ORAL at 07:07

## 2023-07-28 RX ADMIN — IPRATROPIUM BROMIDE AND ALBUTEROL SULFATE 3 ML: 2.5; .5 SOLUTION RESPIRATORY (INHALATION) at 11:07

## 2023-07-28 RX ADMIN — FAMOTIDINE 20 MG: 20 TABLET, FILM COATED ORAL at 08:07

## 2023-07-28 RX ADMIN — HEPARIN SODIUM 5000 UNITS: 5000 INJECTION INTRAVENOUS; SUBCUTANEOUS at 05:07

## 2023-07-28 RX ADMIN — ARFORMOTEROL TARTRATE 15 MCG: 15 SOLUTION RESPIRATORY (INHALATION) at 07:07

## 2023-07-28 RX ADMIN — NICOTINE 1 PATCH: 21 PATCH, EXTENDED RELEASE TRANSDERMAL at 08:07

## 2023-07-28 RX ADMIN — ATORVASTATIN CALCIUM 40 MG: 40 TABLET, FILM COATED ORAL at 08:07

## 2023-07-28 RX ADMIN — MUPIROCIN: 20 OINTMENT TOPICAL at 08:07

## 2023-07-28 RX ADMIN — HEPARIN SODIUM 5000 UNITS: 5000 INJECTION INTRAVENOUS; SUBCUTANEOUS at 02:07

## 2023-07-28 RX ADMIN — IPRATROPIUM BROMIDE AND ALBUTEROL SULFATE 3 ML: 2.5; .5 SOLUTION RESPIRATORY (INHALATION) at 07:07

## 2023-07-28 NOTE — PT/OT/SLP EVAL
"Occupational Therapy Evaluation and Treatment    Name: Zohaib Wilson Sr.  MRN: [id number]  Admitting Diagnosis: Septic shock  Recent Surgery: * No surgery found *      Recommendations:     Discharge Recommendations: nursing facility, skilled  Level of Assistance Recommended: 24 hours significant assistance  Discharge Equipment Recommendations: to be determined by next level of care  Barriers to discharge: Decreased caregiver support    Assessment:     Zohaib Wilson Sr. is a 72 y.o. male with a medical diagnosis of Septic shock. He presents with performance deficits affecting function including weakness, impaired endurance, impaired self care skills, impaired functional mobility, impaired balance, impaired cardiopulmonary response to activity, decreased safety awareness, impaired cognition, impaired coordination, impaired fine motor.    Rehab Prognosis: Fair; patient would benefit from acute OT services to address these deficits and reach maximum level of function.    Plan:     Patient to be seen 2 x/week to address the above listed problems via self-care/home management, therapeutic activities, therapeutic exercises  Plan of Care Expires: 08/11/23  Plan of Care Reviewed with: patient    Subjective     Chief Complaint: Reported "I don't want too."  Patient Comments/Goals: unable to report  Pain/Comfort:  Pain Rating 1: 0/10    Social History:  Patient is a questionable historian. No family present to confirm.   Living Environment: Patient lives with their friend  in a mobile home with ramped  Prior Level of Function: Prior to admission, patient was modified independent with limited household distance ambulation using SPC vs 4WW. Mod I with ADLs.  Roles and Routines: Patient was not driving and not working prior to admission.  Equipment Used at Home: wheelchair, rollator, cane, straight  DME owned (not currently used): none  Assistance Upon Discharge:  questionable    Objective:     Communicated with nurseKimberly, " prior to session. Patient found supine with peripheral IV, telemetry, blood pressure cuff, pulse ox (continuous), edward catheter upon OT entry to room.    General Precautions: Standard, fall   Orthopedic Precautions: N/A   Braces: N/A    Respiratory Status: Room air    Occupational Performance    Gait belt applied - Yes    Bed Mobility:   Supine to sit from left side of bed with moderate assistance. Actively resisting all mobility. Attempting to return to supine, max encouragement throughout.    Functional Mobility/Transfers:  Sit <> Stand Transfer with moderate assistance and 2 persons with hand-held assist  Bed <> Chair Transfer using Stand Pivot technique with moderate assistance and 2 persons with hand-held assist  Increased assistance provided with all mobility due to poor command following and impulsive movements.    Activities of Daily Living:  Upper Body Dressing: maximal assistance  Lower Body Dressing: total assistance    Cognitive/Visual Perceptual:  Cognitive/Psychosocial Skills:    -     Oriented to: Person and Place  -     Follows Commands/attention: Easily distracted  Impulsive with poor insight into deficits.    Physical Exam:  Balance:    -     Sitting: minimum assistance  -     Standing: moderate assistance and of 2 persons  Upper Extremity Range of Motion:     -       Right Upper Extremity: WFL  -       Left Upper Extremity: WFL  Upper Extremity Strength:    -       Right Upper Extremity: Deficits: grossly 4-/5  -       Left Upper Extremity: Deficits: grossly 4-/5   Strength:    -       Right Upper Extremity: Deficits: fair  -       Left Upper Extremity: Deficits: fair  Declined sensation deficits  MMT not completed due to cognitive deficits. Assessment above gross functional observation.    AMPAC 6 Click ADL:  AMPAC Total Score: 10    Treatment & Education:  Therapist provided facilitation and instruction of proper body mechanics, energy conservation, and fall prevention strategies during  tasks listed above  Patient educated on role of OT, POC, and goals for therapy  Patient educated on importance of OOB activities with staff member assistance and sitting OOB majority of the day  Encouraged completion of B UE AROM therex throughout the day to increase functional strength and activity tolerance needed for ADL completion.    Patient left up in chair with all lines intact, call button in reach, RN notified, and chair alarm on.    TREATMENT: ([id number])  Nurse requested therapist transfer patient back to bed due to poor safety in chair. Completed sit>stand min A of 2, stand>pivot to bedside chair with min HHA of 2. Sit>sup min A. Seated scooting with max v/c for technique and min A.     Patient left HOB elevated with all lines intact, call button in reach, RN notified, and bed alarm on.    GOALS:   Multidisciplinary Problems       Occupational Therapy Goals          Problem: Occupational Therapy    Goal Priority Disciplines Outcome Interventions   Occupational Therapy Goal     OT, PT/OT     Description: Goals to be met by: 8/11/23     Patient will increase functional independence with ADLs by performing:    Toileting from bedside commode with Contact Guard Assistance for hygiene and clothing management.   Toilet transfer to bedside commode with Contact Guard Assistance.  Increased functional strength in B UE grossly by 1/2 MM grade.                         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    Tobacco user          Past Surgical History:   Procedure Laterality Date    CATARACT EXTRACTION      LASER OF PROSTATE W/ GREEN LIGHT PVP      ROBOT-ASSISTED REPAIR OF UMBILICAL HERNIA  USING DA MARC XI N/A 7/20/2022    Procedure: XI ROBOTIC REPAIR, HERNIA, UMBILICAL;  Surgeon: Justine Sharp DO;  Location: [facility];  Service: General;  Laterality: N/A;    TRANSURETHRAL RESECTION OF PROSTATE         Time Tracking:     OT Date of Treatment: 07/28/23  OT Start Time: 0940   OT Stop Time: 1005  OT Total Time (min): 25 min    Billable Minutes: Evaluation 15 and Therapeutic Activity 10    Second session  OT Start Time: 1100   OT Stop Time: 1110  OT Total Time (min): 10 min    Billable Minutes: Therapeutic Activity 10    7/28/2023

## 2023-07-28 NOTE — PLAN OF CARE
07/28/23 1410   Post-Acute Status   Post-Acute Authorization Placement   Post-Acute Placement Status Referrals Sent   Discharge Plan   Discharge Plan A Skilled Nursing Facility     Discussed SNF placement with patient at bedside. In network list left on bedside table. Patient has no preference. Agreeable to referrals being sent out to check bed availability. Called in [facility] and faxed [facility]RR to [facility]. Attempted to call daughter Fidelia to advise of DC plan. No answer and vm not activated.

## 2023-07-28 NOTE — PLAN OF CARE
Confused, [place] of hearing. SR with ectopy. Levo weaned off overnight. Knight in place. Good UOP.

## 2023-07-28 NOTE — ASSESSMENT & PLAN NOTE
Source - UTI  Blood and urine culture sent  Shock & organ dysfxn evidenced by MAP < 65, LISHA, encephalopathy  Now off pressors.  Ceftriaxone.

## 2023-07-28 NOTE — ASSESSMENT & PLAN NOTE
Patient is identified as having Combined Systolic and Diastolic heart failure that is Chronic. CHF is currently controlled. Latest ECHO performed and demonstrates- Results for orders placed during the hospital encounter of 04/21/23  Echo  Interpretation Summary  · The left ventricle is normal in size with mild concentric hypertrophy and normal systolic function.  · Grade I left ventricular diastolic dysfunction.  · Normal right ventricular size with normal right ventricular systolic function.  · Intermediate central venous pressure (8 mmHg).  · The estimated ejection fraction is 55%.  monitor clinical status closely. Monitor on telemetry. Patient is off CHF pathway.    Monitor strict Is&Os and daily weights.    Continue to stress to patient importance of self efficacy and  on diet for CHF.   Last BNP reviewed- and noted below   Recent Labs   Lab 07/26/23  2256   BNP 85     hold entresto and metoprolol for now

## 2023-07-28 NOTE — CONSULTS
Referral for home hospice sent to [facility]. Plan to DC today to daughters home with hospice.

## 2023-07-28 NOTE — CONSULTS
"Advance Care Planning   [facility] ([facility])  Palliative Care RN      Patient Name: Zohaib Wilson Sr.  MRN: [id number]  Admission Date: 7/26/2023  Hospital Length of Stay: 2 days  Code Status: Full Code   Attending Provider: Luis Puentes MD  Palliative Care Provider: STORMY Bansal  Primary Care Physician: Nikki Alfredo MD  Principal Problem:Septic shock    Advance Care Planning     Date: 07/28/2023    Today a voluntary meeting took place: other (conference room) Met with patient at bedside then called 2/3 adult children per request    Patient Participation: Patient is able to participate to an extent; however, he was not able to demonstrate understanding of possible risks and outcomes of resuscitation and asked that I call his son Zohaib Wilson Jr.     Attendees (Name and  Relationship to patient):  2/3 adult children Curt Wilson Jr and Fidelia Lutz. Patient has another adult daughter Navin, who per both Fidelia and Zohaib Lowry is in poor health and defers to them. Per their discussions, she supports decisions made by Zohaib Lowry and Fidelia.    Staff attendees (Name and  Role): Pearl Garcia RN-PN    ACP Conversation (General): Understanding of current condition Patient was able to verbalize his desire for oral intake and NO feeding tube. In regard to resuscitation, he stated he "wanted the tube" and as we discussed more about if he were on life-support and was unable to liberate from the ventilator what he would want he asked to call his son Zohaib Lowry. Patient also stated he "wants to go home" and I reviewed with him multiple times the concern that he needs help at home for his safety. He verbalized that he was open to going home with family for the support he needs. Reviewed patient's medical history with Zohaib Lowry and Fidelia, including: dementia, CKD 3, COPD, HTN,, CHF, CAD, and PVD. We reviewed that based on chart review patient lost 44 lbs since 7/22 and that currently based on SLP " "recommendations, patient is NPO due to high-risk for aspiration. PT/OT recs for SNF, but patient is able to ambulate with walker and min assist. Zohaib Lowry and Fidelia both expressed that they are well aware of their dad's preferences to avoid feeding tubes and eat by mouth. I reviewed the conversation that I had with patient and my concern that he does not fully understand the risks and benefits of resuscitation, as well as his statements of not wanting a feeding tube, wanting to go home, and not come back and forth to hospital were contradictory to his statement of wanting "the tube (intubation)". Zohaib Lowry and Fidleia agreed that they don't believe their dad understands and Zohaib Lowry stated "if my dad doesn't want a tube in his nose to fee him, he doesn't want a tube down his throat". We discussed the process of CPR, including the concern that patient has multiple life-limiting illnesses, has been experiencing an overall decline, and that were he to be resuscitated in the event his heart and/or breathing stops those things will not change and he would not be "better" than he was prior. Fidelia and Zohaib Lowry both agreed that DNR/I is most inline with their dad's wishes. They are coming to bedside to see their dad this evening and speak to him about plan.      'Living well': What does living well mean to you? Patient wants to eat and go home with his daughter.     Code Status: DNR     ACP Documents: None    Goals of care: The family endorses that what is most important right now is to focus on avoiding the hospital, quality of life, even if it means sacrificing a little time, and comfort and QOL . We discussed the philosophy of hospice, providing supportive care services to maximize quality of life and comfort care at the end of life. I also discussed the services available with hospice including but not limited to: CNA visit up to an hour a day usually every other day, RN visit usually every other day, MD to oversee care, " "24hr phone number to call with questions or concerns, comfort medications, DME, access to GIP unit for acute symptom management, access to respite care, , SW, volunteer program, and bereavement support. Zohaib Lowry stated to Fidelia, "This is what we have been talking about for dad". They are both familiar with hospice from family that have received. Fidelia is able to bring patient home and care for him, as she is a homemaker. Family chose [facility] due to the location of their GIP unit, which I anticipate will be needed in the future for symptom management.     Accordingly, we have decided that the best plan to meet the patient's goals includes enrolling in hospice care with [facility] for home. Patient is ready to D/C and does not require DME that must be delivered prior. [facility] will meet at home to admit tonight. MD and CM updated.     Thank you for allowing Palliative Medicine to assist in the care of Mr. Curt Wilson  .             Pearl Garcia RN-PN, Palliative Medicine   [phone]   "

## 2023-07-28 NOTE — SUBJECTIVE & OBJECTIVE
Interval History:   States he is feeling better today.  He specifically denies headache, chest pain, shortness a breath, abdominal pain, nausea, vomiting, diarrhea, fevers, sweats, chills.      Objective:     Vital Signs (Most Recent):  Temp: 98.4 °F (36.9 °C) (23 0707)  Pulse: 67 (23 0740)  Resp: (!) 27 (23 0740)  BP: (!) 104/57 (23 0736)  SpO2: 97 % (23 0740) Vital Signs (24h Range):  Temp:  [97.6 °F (36.4 °C)-98.4 °F (36.9 °C)] 98.4 °F (36.9 °C)  Pulse:  [48-80] 67  Resp:  [10-30] 27  SpO2:  [87 %-100 %] 97 %  BP: ()/(40-67) 104/57     Weight: 77.6 kg (171 lb 1.2 oz)  Body mass index is 23.2 kg/m².      Intake/Output Summary (Last 24 hours) at 2023 0810  Last data filed at 2023 0700  Gross per 24 hour   Intake 312.94 ml   Output 1985 ml   Net -1672.06 ml        Physical Exam  Vitals and nursing note reviewed.   Constitutional:       General: He is awake. He is not in acute distress.     Appearance: He is normal weight.      Comments: Unkempt, unclean appearance   HENT:      Head: Atraumatic.   Eyes:      Conjunctiva/sclera: Conjunctivae normal.   Neck:      Vascular: No JVD.   Cardiovascular:      Rate and Rhythm: Normal rate and regular rhythm.   Pulmonary:      Effort: No accessory muscle usage or respiratory distress.      Breath sounds: No wheezing.   Abdominal:      General: Bowel sounds are decreased. There is no distension.      Palpations: Abdomen is soft.      Tenderness: There is no abdominal tenderness.   Musculoskeletal:      Right lower le+ Edema present.      Left lower le+ Edema present.   Skin:     General: Skin is warm and dry.   Neurological:      Mental Status: He is alert. Mental status is at baseline.   Psychiatric:         Behavior: Behavior is cooperative.         Cognition and Memory: Cognition is impaired.             Vents:       Lines/Drains/Airways       Central Venous Catheter Line  Duration             Percutaneous Central Line  Insertion/Assessment - Triple Lumen  07/27/23 0040 External Jugular Right 1 day              Drain  Duration                  Urethral Catheter 07/26/23 2230 Double-lumen 16 Fr. 1 day                    Significant Labs:    CBC/Anemia Profile:  Recent Labs   Lab 07/26/23 2057 07/27/23  0500 07/28/23  0445   WBC 13.92* 12.80* 8.53   HGB 15.3 12.4* 12.1*   HCT 46.3 37.5* 36.1*    201 185   MCV 90 90 90   RDW 14.6* 14.4 14.5        Chemistries:  Recent Labs   Lab 07/26/23 2057 07/26/23  2251 07/27/23  0126 07/27/23  0500 07/27/23  0944 07/27/23  1417 07/28/23  0445     --    < > 142 143 141 140   K 6.0*  --    < > 4.3 4.1 3.8 3.8     --    < > 111* 113* 114* 111*   CO2 17*  --    < > 19* 19* 19* 20*   BUN 83*  --    < > 74* 69* 65* 56*   CREATININE 3.8*  --    < > 3.0* 2.7* 2.6* 2.4*   CALCIUM 12.6*  --    < > 10.2 9.5 8.8 9.1   ALBUMIN 2.9*  --   --   --   --   --   --    PROT 7.7  --   --   --   --   --   --    BILITOT 0.5  --   --   --   --   --   --    ALKPHOS 134  --   --   --   --   --   --    ALT 27  --   --   --   --   --   --    AST 42*  --   --   --   --   --   --    MG  --  <0.7*  --  3.1*  --   --  2.2    < > = values in this interval not displayed.       A1C: No results for input(s): HGBA1C in the last 48 hours.  ABGs:   Recent Labs   Lab 07/26/23 2135   PH 7.338*   PCO2 28.9*   HCO3 15.5*   POCSATURATED 96   BE -10     Blood Culture:   Recent Labs   Lab 07/26/23 2059   LABBLOO No Growth to date  No Growth to date  No Growth to date  No Growth to date     Coagulation: No results for input(s): PT, INR, APTT in the last 48 hours.  Lactic Acid:   Recent Labs   Lab 07/26/23  2057 07/27/23  0153 07/27/23  0500   LACTATE 3.0* 1.3 1.2     Pathology Results  (Last 10 years)      None          POCT Glucose:   Recent Labs   Lab 07/27/23  2336 07/28/23  0449 07/28/23  0804   POCTGLUCOSE 87 73 71     Respiratory Culture: No results for input(s): GSRESP, RESPIRATORYC in the last 48  hours.  Troponin:   Recent Labs   Lab 07/27/23  0126 07/27/23  0153 07/27/23  0500   TROPONINI 0.111* 0.120* 0.118*     Urine Culture:   Recent Labs   Lab 07/26/23 2226   LABURIN GRAM NEGATIVE MURALI  10,000 - 49,999 cfu/ml  Identification and susceptibility pending  *     Urine Studies:   Recent Labs   Lab 07/26/23 2226   COLORU Yellow   APPEARANCEUA Clear   PHUR 7.0   SPECGRAV 1.010   PROTEINUA 1+*   GLUCUA 3+*   KETONESU Negative   BILIRUBINUA Negative   OCCULTUA 2+*   NITRITE Negative   UROBILINOGEN Negative   LEUKOCYTESUR 3+*   RBCUA 2   WBCUA 90*   BACTERIA None   HYALINECASTS 6*       Significant Imaging:  I have reviewed all pertinent imaging results/findings within the past 24 hours.

## 2023-07-28 NOTE — PT/OT/SLP EVAL
"Physical Therapy Evaluation    Patient Name:  Zohaib Wilson Sr.   MRN:  [id number]    Recommendations:     Discharge Recommendations: nursing facility, skilled   Discharge Equipment Recommendations: to be determined by next level of care   Barriers to discharge: Decreased caregiver support    Assessment:     Zohaib Wilson Sr. is a 72 y.o. male admitted with a medical diagnosis of Septic shock.  He presents with the following impairments/functional limitations: weakness, impaired endurance, impaired functional mobility, gait instability, impaired balance, pain, decreased safety awareness, decreased lower extremity function, decreased coordination.    Rehab Prognosis: Fair; patient would benefit from acute skilled PT services to address these deficits and reach maximum level of function.    Recent Surgery: * No surgery found *      Plan:     During this hospitalization, patient to be seen 3 x/week to address the identified rehab impairments via gait training, therapeutic activities, therapeutic exercises and progress toward the following goals:    Plan of Care Expires:  08/11/23    Subjective     Chief Complaint: "I can't do it"  Patient/Family Comments/goals: none stated  Pain/Comfort:  Pain Rating 1: 0/10    Patients cultural, spiritual, Lutheran conflicts given the current situation: no    Living Environment:  Pt is a poor historian, hx taken from pt and chart. Pt reports living with his friend in a 1 story mobile home, ramp to enter, 2 children can check in on him.  Prior to admission, patients level of function was MOD I bathing, dressing, ADLs, limited household ambulation using rolator.  Equipment used at home: cane, straight, wheelchair, rollator.  DME owned (not currently used): none.  Upon discharge, patient will have assistance from KIDS.    Objective:     Communicated with nurse Harris prior to session.  Patient found supine with peripheral IV, pulse ox (continuous), telemetry, blood pressure cuff, " "edward catheter  upon PT entry to room.    General Precautions: Standard, fall  Orthopedic Precautions:N/A   Braces: N/A  Respiratory Status: Room air    Exams:  Cognitive Exam:  Patient is oriented to Person, Place, Situation, and not oriented to time  Sensation:    -       Intact  Skin Integrity/Edema:      -       Skin integrity: Visible skin intact  RLE ROM: WFL  RLE Strength: Grossly 4-/5  LLE ROM: WFL  LLE Strength: Grossly 4-/5    Functional Mobility:  Bed Mobility:     Rolling Left:  minimum assistance  Scooting: minimum assistance  Supine to Sit: minimum assistance, multiple attempts, would come 1/2 into sitting then throw self back reporting, "I can't do this"  Transfers:     Sit to Stand:  minimum assistance with no AD and hand-held assist  Bed to Chair: minimum assistance with  no AD and hand-held assist  using  Stand Pivot  Gait: Unable to progress due to pt safety, will progress as appropriate  Balance: Demonstrated poor sitting balance, poor dynamic balance during transfer  Pt impulsive, did not want to stay in chair but encouraged to try and sit up for at least an hour    AM-PAC 6 CLICK MOBILITY  Total Score:14     Treatment & Education:  Pt educated on role of PT in acute care and POC. Educated on importance of OOB activities, activity pacing, and HEP (marching/hip flex, hip abd, heel slides/LAQ, quad sets, ankle pumps) in order to maintain/regain strength. Encouraged to sit up in chair for all meals. Educated on proper use of RW for safety and to reduce risk of falling. Educated on "call don't fall" policy and increased risk of falling due to weakness, instructed to utilize call bell for assistance with all transfers. Pt agreeable to all requests.    Patient left up in chair with all lines intact, call button in reach, chair alarm on, and nurse notified.    GOALS:   Multidisciplinary Problems       Physical Therapy Goals          Problem: Physical Therapy    Goal Priority Disciplines Outcome Goal " Variances Interventions   Physical Therapy Goal     PT, PT/OT      Description: Goals to be met by 8/11/23.  1. Pt will complete bed mobility SBA.  2. Pt will complete sit to stand SBA.  3. Pt will ambulate 100ft MIN A using RW.  4. Pt will increase AMPAC score by 2 points to progress functional mobility.                         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    Tobacco user        Past Surgical History:   Procedure Laterality Date    CATARACT EXTRACTION      LASER OF PROSTATE W/ GREEN LIGHT PVP      ROBOT-ASSISTED REPAIR OF UMBILICAL HERNIA USING DA MARC XI N/A 7/20/2022    Procedure: XI ROBOTIC REPAIR, HERNIA, UMBILICAL;  Surgeon: Justine Sharp DO;  Location: [facility];  Service: General;  Laterality: N/A;    TRANSURETHRAL RESECTION OF PROSTATE         Time Tracking:     PT Received On: 07/28/23  PT Start Time: 0945     PT Stop Time: 1010  PT Total Time (min): 25 min     Billable Minutes: Evaluation 10min and Therapeutic Activity 15min      07/28/2023

## 2023-07-28 NOTE — PT/OT/SLP PROGRESS
"Speech Language Pathology Treatment    Patient Name:  Zohaib Wilson Sr.   MRN:  [id number]  Admitting Diagnosis: Septic shock    Recommendations:                 General Recommendations:  Follow-up not indicated as pt transitions to hospice at home.  Diet recommendations:   (Pleasuring feedings per pt preference)  Aspiration Precautions: Feed only when awake/alert, Frequent oral care, HOB to 90 degrees, Remain upright 30 minutes post meal, Small bites/sips, and Standard aspiration precautions   General Precautions: Standard, aspiration  Communication strategies:  provide increased time to answer, careful listening needed    Subjective     Pt seen bedside for ST.  Smiling upon arrival as ST brought him food/liquids to consume.  No c/o pain or family present.  Pt/family meeting with palliative medicine today.  Pt stated, "I want to go home!"  Patient goals: to go home     Pain/Comfort:  Pain Rating 1: 0/10  Pain Rating Post-Intervention 1: 0/10  Pain Rating 2: 0/10  Pain Rating Post-Intervention 2: 0/10    Respiratory Status: Room air.  Pt received breathing treatment prior to arrival and asked for another after PO consumption.    Objective:     Has the patient been evaluated by SLP for swallowing?   Yes  Keep patient NPO?  (Pt recommended for pleasure feeds as he transitions to hospice)   Current Respiratory Status:  Room air.  Pt received breathing treatment prior to arrival and asked for another after PO consumption.    Tx feeds of thin liquids and pureed solids with consistent coughing and c/o SOB. Pt very impulsive during PO intake with difficulty self-regulating quantity or utilizing compensatory strategies.       Assessment:     Zohaib Wilson Sr. is a 72 y.o. male with an SLP diagnosis of suspected Dysphagia in the setting of dx septic shock, UTI with hx dementia, ADL dependence and significant pulmonary co- morbidities placing him at increased risk of dysphagia/aspiration.  He presents with adequate PATTI and " ability to communicate basic needs, although hearing impaired and speech unintelligible at times.  Pt presents with SOB and increased cough frequency during bedside CSE today. MBSS warranted as comprehensive swallowing assessment; however, pt/family have decided to transition home with hospice with recommended consumption of pleasure feeds per pt preference.    Goals:   Multidisciplinary Problems       SLP Goals          Problem: SLP    Goal Priority Disciplines Outcome   SLP Goal     SLP Unable to Meet, Plan Revised   Description: 1.  Pt will consume least restrictive PO diet without incident.--recommended pleasure feeds per pt preference.    2.  MBSS as comprehensive swallowing assessment--NOT met.  Pt d/c with hospice.                       Plan:     Patient to be seen:  2 x/week, 3 x/week   Plan of Care expires:   (Goals not met.  Pt will transition to hospice at home today.)  Plan of Care reviewed with:  patient   SLP Follow-Up:  No       Discharge recommendations:  home with hospice   Barriers to Discharge:  None    Time Tracking:     SLP Treatment Date:   07/28/23  Speech Start Time:  1100  Speech Stop Time:  1130     Speech Total Time (min):  30 min    Billable Minutes: Speech Therapy Individual 15 minutes and Treatment Swallowing Dysfunction 15 minutes    07/28/2023

## 2023-07-28 NOTE — PLAN OF CARE
OT brendon completed. Sup>sit min A, sit>stand min A, stand>pivot with min A. Recommending SNF at d/c.

## 2023-07-28 NOTE — HPI
72 year old male with medical issues including combined chronic heart failure; chronic kidney disease; COPD/emphysema; DORIAN; dementia; hypercalcemia     Presented to ED on night of 7/26 after family found on floor conscious but disoriented to time, place, and events; difficulty following commands. Son reports that roommate stated fall out of bed just shortly before the son arrived and called EMS.     ED evaluation revealed lactate 3.0; wbc 13.9; potassium 6.0; creatinine 3.8 (baseline 1.6); calcium 12.6; Mag 0.7; ; troponin 0.162  UA with wbc 90 and 3+ leukocytes  CT head with no acute adverse findings     Treated with sepsis 30ml/kg IVF bolus 2.5L; rocephin IV; and K shift with lokelma, insulin/d50, bicarb  Hypotensive with MAP < 65 despite above  Started on pressor  Critical care contacted for ICU admission for septic shock        Hospital/ICU Course:  7/27:   Seen and examined in ED. Awake and alert, oriented to person only. Speech clear at times and garbled at others.  7/28:   Off norepi since 2am.  Ur culture with GNR 10-50k CFU.  CTX.

## 2023-07-28 NOTE — PLAN OF CARE
PT EVAL complete. Required MIN A for bed mobility, MIN A for bed>chair transfer. Recommending SNF upon d/c.

## 2023-07-28 NOTE — PROGRESS NOTES
[facility] Care ([facility])  Critical Care Medicine  Progress Note    Patient Name: Zohaib Wilson Sr.  MRN: [id number]  Admission Date: 7/26/2023  Hospital Length of Stay: 2 days  Code Status: Full Code  Attending Provider: Aquiles Lou MD  Primary Care Provider: Nikki Alfredo MD   Principal Problem: Septic shock    Subjective:     HPI:  72 year old male with medical issues including combined chronic heart failure; chronic kidney disease; COPD/emphysema; DORIAN; dementia; hypercalcemia    Presented to ED on night of 7/26 after family found on floor conscious but disoriented to time, place, and events; difficulty following commands. Son reports that roommate stated fall out of bed just shortly before the son arrived and called EMS.    ED evaluation revealed lactate 3.0; wbc 13.9; potassium 6.0; creatinine 3.8 (baseline 1.6); calcium 12.6; Mag 0.7; ; troponin 0.162  UA with wbc 90 and 3+ leukocytes  CT head with no acute adverse findings    Treated with sepsis 30ml/kg IVF bolus 2.5L; rocephin IV; and K shift with lokelma, insulin/d50, bicarb  Hypotensive with MAP < 65 despite above  Started on pressor  Critical care contacted for ICU admission for septic shock      Hospital/ICU Course:  7/27:   Seen and examined in ED. Awake and alert, oriented to person only. Speech clear at times and garbled at others.  7/28:   Off norepi since 2am.  Ur culture with GNR 10-50k CFU.  CTX.      Interval History:   States he is feeling better today.  He specifically denies headache, chest pain, shortness a breath, abdominal pain, nausea, vomiting, diarrhea, fevers, sweats, chills.      Objective:     Vital Signs (Most Recent):  Temp: 98.4 °F (36.9 °C) (07/28/23 0707)  Pulse: 67 (07/28/23 0740)  Resp: (!) 27 (07/28/23 0740)  BP: (!) 104/57 (07/28/23 0736)  SpO2: 97 % (07/28/23 0740) Vital Signs (24h Range):  Temp:  [97.6 °F (36.4 °C)-98.4 °F (36.9 °C)] 98.4 °F (36.9 °C)  Pulse:  [48-80] 67  Resp:  [10-30]  27  SpO2:  [87 %-100 %] 97 %  BP: ()/(40-67) 104/57     Weight: 77.6 kg (171 lb 1.2 oz)  Body mass index is 23.2 kg/m².      Intake/Output Summary (Last 24 hours) at 2023 0810  Last data filed at 2023 0700  Gross per 24 hour   Intake 312.94 ml   Output 1985 ml   Net -1672.06 ml        Physical Exam  Vitals and nursing note reviewed.   Constitutional:       General: He is awake. He is not in acute distress.     Appearance: He is normal weight.      Comments: Unkempt, unclean appearance   HENT:      Head: Atraumatic.   Eyes:      Conjunctiva/sclera: Conjunctivae normal.   Neck:      Vascular: No JVD.   Cardiovascular:      Rate and Rhythm: Normal rate and regular rhythm.   Pulmonary:      Effort: No accessory muscle usage or respiratory distress.      Breath sounds: No wheezing.   Abdominal:      General: Bowel sounds are decreased. There is no distension.      Palpations: Abdomen is soft.      Tenderness: There is no abdominal tenderness.   Musculoskeletal:      Right lower le+ Edema present.      Left lower le+ Edema present.   Skin:     General: Skin is warm and dry.   Neurological:      Mental Status: He is alert. Mental status is at baseline.   Psychiatric:         Behavior: Behavior is cooperative.         Cognition and Memory: Cognition is impaired.             Vents:       Lines/Drains/Airways       Central Venous Catheter Line  Duration             Percutaneous Central Line Insertion/Assessment - Triple Lumen  23 0040 External Jugular Right 1 day              Drain  Duration                  Urethral Catheter 23 2230 Double-lumen 16 Fr. 1 day                    Significant Labs:    CBC/Anemia Profile:  Recent Labs   Lab 23  0500 23  0445   WBC 13.92* 12.80* 8.53   HGB 15.3 12.4* 12.1*   HCT 46.3 37.5* 36.1*    201 185   MCV 90 90 90   RDW 14.6* 14.4 14.5        Chemistries:  Recent Labs   Lab 23  2251 23  0126  07/27/23  0500 07/27/23  0944 07/27/23  1417 07/28/23  0445     --    < > 142 143 141 140   K 6.0*  --    < > 4.3 4.1 3.8 3.8     --    < > 111* 113* 114* 111*   CO2 17*  --    < > 19* 19* 19* 20*   BUN 83*  --    < > 74* 69* 65* 56*   CREATININE 3.8*  --    < > 3.0* 2.7* 2.6* 2.4*   CALCIUM 12.6*  --    < > 10.2 9.5 8.8 9.1   ALBUMIN 2.9*  --   --   --   --   --   --    PROT 7.7  --   --   --   --   --   --    BILITOT 0.5  --   --   --   --   --   --    ALKPHOS 134  --   --   --   --   --   --    ALT 27  --   --   --   --   --   --    AST 42*  --   --   --   --   --   --    MG  --  <0.7*  --  3.1*  --   --  2.2    < > = values in this interval not displayed.       A1C: No results for input(s): HGBA1C in the last 48 hours.  ABGs:   Recent Labs   Lab 07/26/23 2135   PH 7.338*   PCO2 28.9*   HCO3 15.5*   POCSATURATED 96   BE -10     Blood Culture:   Recent Labs   Lab 07/26/23 2059   LABBLOO No Growth to date  No Growth to date  No Growth to date  No Growth to date     Coagulation: No results for input(s): PT, INR, APTT in the last 48 hours.  Lactic Acid:   Recent Labs   Lab 07/26/23 2057 07/27/23  0153 07/27/23  0500   LACTATE 3.0* 1.3 1.2     Pathology Results  (Last 10 years)      None          POCT Glucose:   Recent Labs   Lab 07/27/23  2336 07/28/23  0449 07/28/23  0804   POCTGLUCOSE 87 73 71     Respiratory Culture: No results for input(s): GSRESP, RESPIRATORYC in the last 48 hours.  Troponin:   Recent Labs   Lab 07/27/23  0126 07/27/23  0153 07/27/23  0500   TROPONINI 0.111* 0.120* 0.118*     Urine Culture:   Recent Labs   Lab 07/26/23 2226   LABURIN GRAM NEGATIVE MURALI  10,000 - 49,999 cfu/ml  Identification and susceptibility pending  *     Urine Studies:   Recent Labs   Lab 07/26/23 2226   COLORU Yellow   APPEARANCEUA Clear   PHUR 7.0   SPECGRAV 1.010   PROTEINUA 1+*   GLUCUA 3+*   KETONESU Negative   BILIRUBINUA Negative   OCCULTUA 2+*   NITRITE Negative   UROBILINOGEN Negative    LEUKOCYTESUR 3+*   RBCUA 2   WBCUA 90*   BACTERIA None   HYALINECASTS 6*       Significant Imaging:  I have reviewed all pertinent imaging results/findings within the past 24 hours.      ABG  Recent Labs   Lab 07/26/23  2135   PH 7.338*   PO2 85   PCO2 28.9*   HCO3 15.5*   BE -10     Assessment/Plan:     Neuro  Dementia, unspecified type with acute encephalopathy  Documented baseline dementia in PCP and HH notes  No noted medications  Discussion with son, he believes this is more of hearing impairment then true dementia  CT head without acute changes  Encephalopathy likely s/t sepsis      Pulmonary  COPD, group C, by GOLD 2013 classification  No evidence of acute exacerbation  Continue ICS, LABA maintenance nebs  Prn duoneb    Cardiac/Vascular  Elevated troponin  Likely s/t shock/demand ischemia      Chronic combined systolic and diastolic congestive heart failure  Patient is identified as having Combined Systolic and Diastolic heart failure that is Chronic. CHF is currently controlled. Latest ECHO performed and demonstrates- Results for orders placed during the hospital encounter of 04/21/23  Echo  Interpretation Summary  · The left ventricle is normal in size with mild concentric hypertrophy and normal systolic function.  · Grade I left ventricular diastolic dysfunction.  · Normal right ventricular size with normal right ventricular systolic function.  · Intermediate central venous pressure (8 mmHg).  · The estimated ejection fraction is 55%.  monitor clinical status closely. Monitor on telemetry. Patient is off CHF pathway.    Monitor strict Is&Os and daily weights.    Continue to stress to patient importance of self efficacy and  on diet for CHF.   Last BNP reviewed- and noted below   Recent Labs   Lab 07/26/23  2256   BNP 85     hold entresto and metoprolol for now      Primary hypertension  holding antihypertensives  Likely resume soon.    Mixed hyperlipidemia  Continue statin    Renal/  Acute  cystitis without hematuria  No documented MDRO or pseudomonas history  Continue q24h rocephin  Follow cultures to guide ongoing abx    Hypomagnesemia  Replace with 4g IV Mag  Repeat level in am for response to therapy    Acute renal failure superimposed on stage 3b chronic kidney disease  Baseline creatinine 1.6  Suspect worsened s/t sepsis, shock  S/p IVF resuscitation; given heart failure will hold additional IVF  Pressor to support MAP for goal 65 or greater  Monitor creatinine trend    ID  * Septic shock  Source - UTI  Blood and urine culture sent  Shock & organ dysfxn evidenced by MAP < 65, LISHA, encephalopathy  Now off pressors.  Ceftriaxone.    Endocrine  Weight loss, abnormal  Noted in documentation, appears unintentional  25kg loss over one year  Will obtain RD dietary recs    Hypothyroid  Continue home dose levothyroxine    Other  DORIAN (obstructive sleep apnea)  Home CPAP (autoPAP 6-20) ordered   Will verify use and order CPAP at exp pressure 10 while inpt    Tobacco user  Per record, ongoing 1.5 pack/day tobacco smoker; 90 pack year history  Educate and encourage on cessation benefits and resources once mental status improved           Aquiles Lou MD  Critical Care Medicine  [facility] - [facility] ([facility])

## 2023-07-28 NOTE — PT/OT/SLP PROGRESS
Physical Therapy Treatment    Patient Name:  Zohaib Wilson Sr.   MRN:  [id number]    Recommendations:     Discharge Recommendations: nursing facility, skilled  Discharge Equipment Recommendations: to be determined by next level of care  Barriers to discharge: Decreased caregiver support    Assessment:     Zohaib Wilson Sr. is a 72 y.o. male admitted with a medical diagnosis of Septic shock.  He presents with the following impairments/functional limitations: weakness, impaired endurance, impaired functional mobility, gait instability, impaired balance, decreased safety awareness, decreased lower extremity function, decreased coordination, impaired cognition.    Rehab Prognosis: Fair; patient would benefit from acute skilled PT services to address these deficits and reach maximum level of function.    Recent Surgery: * No surgery found *      Plan:     During this hospitalization, patient to be seen 3 x/week to address the identified rehab impairments via gait training, therapeutic activities, therapeutic exercises and progress toward the following goals:    Plan of Care Expires:  08/11/23    Subjective     Chief Complaint: Pt requested to be transferred back to bed.  Patient/Family Comments/goals: none stated  Pain/Comfort:  Pain Rating 1: 0/10      Objective:     Communicated with nurse taylor prior to session.  Patient found up in chair with peripheral IV, pulse ox (continuous), telemetry, blood pressure cuff, edward catheter upon PT entry to room.     General Precautions: Standard, fall  Orthopedic Precautions: N/A  Braces: N/A  Respiratory Status: Room air     Functional Mobility:  Bed Mobility:     Rolling Right: minimum assistance  Scooting: minimum assistance  Sit to Supine: minimum assistance  Transfers:     Sit to Stand:  minimum assistance with no AD and hand-held assist  Bed to Chair: minimum assistance with  no AD and hand-held assist  using  Stand Pivot  Gait: Unable to progress due to pt safety, pt  able to take 2 steps to pivot, pt impulsive  Balance: Demonstrated poor sitting balance, poor dynamic balance during transfer      AM-PAC 6 CLICK MOBILITY  Turning over in bed (including adjusting bedclothes, sheets and blankets)?: 3  Sitting down on and standing up from a chair with arms (e.g., wheelchair, bedside commode, etc.): 3  Moving from lying on back to sitting on the side of the bed?: 3  Moving to and from a bed to a chair (including a wheelchair)?: 3  Need to walk in hospital room?: 1  Climbing 3-5 steps with a railing?: 1  Basic Mobility Total Score: 14     Treatment & Education:  Reviewed importance of OOB activities and HEP (hip flex, LAQ, ham curls, ankle pumps) in order to maintain/regain strength. Reviewed proper use of RW for safety and to reduce risk of falling. Reviewed increased risk of falling due to weakness, instructed to utilize call bell for assistance for all transfers. Pt agreeable to all requests.    Patient left supine with all lines intact, call button in reach, and nurse notified.    GOALS:   Multidisciplinary Problems       Physical Therapy Goals          Problem: Physical Therapy    Goal Priority Disciplines Outcome Goal Variances Interventions   Physical Therapy Goal     PT, PT/OT      Description: Goals to be met by 8/11/23.  1. Pt will complete bed mobility SBA.  2. Pt will complete sit to stand SBA.  3. Pt will ambulate 100ft MIN A using RW.  4. Pt will increase AMPAC score by 2 points to progress functional mobility.                       Time Tracking:     PT Received On: 07/28/23  PT Start Time: 1102     PT Stop Time: 1110  PT Total Time (min): 8 min     Billable Minutes: Therapeutic Activity 8min    Treatment Type: Treatment  PT/PTA: PT     Number of PTA visits since last PT visit: 0     07/28/2023

## 2023-07-28 NOTE — DISCHARGE SUMMARY
[facility] - Telemetry ([facility] Medicine  Discharge Summary      Patient Name: Zohaib Wilson Sr.  MRN: [id number]  ABBY: [id number]  Patient Class: IP- Inpatient  Admission Date: 7/26/2023  Hospital Length of Stay: 2 days  Discharge Date and Time: 7/28/2023  5:11 PM  Attending Physician: Luis Puentes MD   Discharging Provider: Luis Puentes MD  Primary Care Provider: Nikki Alfredo MD    Primary Care Team: [facility] MEDICINE A    HPI:   72 year old male with medical issues including combined chronic heart failure; chronic kidney disease; COPD/emphysema; DORIAN; dementia; hypercalcemia     Presented to ED on night of 7/26 after family found on floor conscious but disoriented to time, place, and events; difficulty following commands. Son reports that roommate stated fall out of bed just shortly before the son arrived and called EMS.     ED evaluation revealed lactate 3.0; wbc 13.9; potassium 6.0; creatinine 3.8 (baseline 1.6); calcium 12.6; Mag 0.7; ; troponin 0.162  UA with wbc 90 and 3+ leukocytes  CT head with no acute adverse findings     Treated with sepsis 30ml/kg IVF bolus 2.5L; rocephin IV; and K shift with lokelma, insulin/d50, bicarb  Hypotensive with MAP < 65 despite above  Started on pressor  Critical care contacted for ICU admission for septic shock        Hospital/ICU Course:  7/27:   Seen and examined in ED. Awake and alert, oriented to person only. Speech clear at times and garbled at others.  7/28:   Off norepi since 2am.  Ur culture with GNR 10-50k CFU.  CTX.      * No surgery found *      Hospital Course:   Palliative Care consulted, met with family  Decision for home hospice  Overall poor prognosis       Goals of Care Treatment Preferences:  Code Status: Full Code      Consults:   Consults (From admission, onward)          Status Ordering Provider     Inpatient consult to Social Work  Once        Provider:  (Not yet assigned)    Completed LUIS PUENTES     Inpatient consult to  Palliative Care  Once        Provider:  (Not yet assigned)    Acknowledged MARSHALL MCALLISTER     Inpatient consult to Social Work  Once        Provider:  (Not yet assigned)    Completed MARSHALL MCALLISTER     IP consult to case management  Once        Provider:  (Not yet assigned)    Completed CALLY ESCOBEDO     Inpatient consult to Registered Dietitian/Nutritionist  Once        Provider:  (Not yet assigned)    Completed CALLY ESCOBEDO     Inpatient consult to Critical Care Medicine  Once        Provider:  Antonio Butler MD    Acknowledged MANPREET GUTIERREZ            No new Assessment & Plan notes have been filed under this hospital service since the last note was generated.  Service: Hospital Medicine    Final Active Diagnoses:    Diagnosis Date Noted POA    Acute cystitis without hematuria [N30.00] 07/27/2023 Yes    Elevated troponin [R77.8] 07/27/2023 Yes    Dementia, unspecified type with acute encephalopathy [F03.90] 04/21/2023 Yes    Weight loss, abnormal [R63.4] 04/21/2023 Yes    Acute renal failure superimposed on stage 3b chronic kidney disease [N17.9, N18.32] 02/04/2022 Yes    DORIAN (obstructive sleep apnea) [G47.33] 11/15/2021 Yes    Chronic combined systolic and diastolic congestive heart failure [I50.42] 11/04/2021 Yes    Primary hypertension [I10] 11/04/2021 Yes    COPD, group C, by GOLD 2013 classification [J44.9] 09/14/2021 Yes    Mixed hyperlipidemia [E78.2] 09/14/2021 Yes    Hypothyroid [E03.9] 09/14/2021 Yes    Tobacco user [Z72.0] 09/14/2021 Yes      Problems Resolved During this Admission:    Diagnosis Date Noted Date Resolved POA    PRINCIPAL PROBLEM:  Septic shock [A41.9, R65.21] 07/27/2023 07/28/2023 Yes    Hyperkalemia [E87.5] 07/27/2023 07/28/2023 Yes    Hypomagnesemia [E83.42] 07/27/2023 07/28/2023 Yes       Discharged Condition: poor    Disposition: Hospice/Home    Follow Up:   Follow-up Information       [facility], [facility] Follow up.    Specialties: Home Hospice, Respite Care  Contact  information:  [address] [facility] [address]  [phone]                         Patient Instructions:      Reason for not Prescribing Nicotine Replacement     Order Specific Question Answer Comments   Reason for not Prescribing: Not medically appropriate at this time        Significant Diagnostic Studies: Labs: All labs within the past 24 hours have been reviewed    Pending Diagnostic Studies:       Procedure Component Value Units Date/Time    HCV Virus Hold Specimen [[id number]] Collected: 07/26/23 2056    Order Status: Sent Lab Status: In process Updated: 07/26/23 2057    Specimen: Blood            Medications:  Reconciled Home Medications:      Medication List        START taking these medications      cephALEXin 500 MG capsule  Commonly known as: KEFLEX  Take 1 capsule (500 mg total) by mouth every 12 (twelve) hours. for 5 days            CONTINUE taking these medications      albuterol 1.25 mg/3 mL Nebu  Commonly known as: ACCUNEB  Take 3 mLs (1.25 mg total) by nebulization 2 (two) times a day. Rescue     aspirin 81 MG EC tablet  Commonly known as: ECOTRIN  Take 1 tablet (81 mg total) by mouth once daily.     atorvastatin 40 MG tablet  Commonly known as: LIPITOR  Take 1 tablet (40 mg total) by mouth once daily.     docusate sodium 100 MG capsule  Commonly known as: COLACE  Take 1 capsule (100 mg total) by mouth 2 (two) times daily.     ENTRESTO 49-51 mg per tablet  Generic drug: sacubitriL-valsartan  TAKE 1 TABLET BY MOUTH TWICE DAILY     levothyroxine 25 MCG tablet  Commonly known as: SYNTHROID  Take 1 tablet (25 mcg total) by mouth before breakfast.     metoprolol succinate 25 MG 24 hr tablet  Commonly known as: TOPROL-XL  TAKE 1 TABLET(25 MG) BY MOUTH EVERY DAY     prednisoLONE acetate 1 % Drps  Commonly known as: PRED FORTE  Place 1 drop into the left eye 4 (four) times daily.     SITagliptin phosphate 25 MG Tab  Commonly known as: JANUVIA  Take 25 mg by mouth.     tamsulosin 0.4 mg  Cap  Commonly known as: FLOMAX  Take 1 capsule (0.4 mg total) by mouth once daily.     TRELEGY ELLIPTA 100-62.5-25 mcg Dsdv  Generic drug: fluticasone-umeclidin-vilanter  Inhale 1 puff into the lungs once daily.              Indwelling Lines/Drains at time of discharge:   Lines/Drains/Airways       None                   Time spent on the discharge of patient: 40 minutes         Luis Puentes MD  Department of Hospital Medicine  [facility] - Telemetry ([facility])

## 2023-07-28 NOTE — PROGRESS NOTES
Pt transferred to 217 via bed at this time. VSS. Knight removed. Due to void by 0000. Spoke with family, notified of transfer and need for goals of care meeting with MD.

## 2023-07-30 LAB — MRSA SPEC QL CULT: NORMAL

## 2023-08-01 LAB
BACTERIA BLD CULT: NORMAL
BACTERIA BLD CULT: NORMAL
POCT GLUCOSE: 101 MG/DL (ref 70–110)

## 2023-08-25 RX ORDER — TAMSULOSIN HYDROCHLORIDE 0.4 MG/1
0.4 CAPSULE ORAL
Qty: 90 CAPSULE | Refills: 2 | Status: SHIPPED | OUTPATIENT
Start: 2023-08-25

## 2023-08-25 NOTE — TELEPHONE ENCOUNTER
No care due was identified.  Health [facility] Embedded Care Due Messages. Reference number: [id number].   8/25/2023 11:57:47 AM CDT

## 2023-08-25 NOTE — TELEPHONE ENCOUNTER
Refill Routing Note   Medication(s) are not appropriate for processing by [facility] for the following reason(s):      Patient seen in ED/Hospital since LOV with provider    [facility] action(s):  Defer Care Due:  None identified            Appointments  past 12m or future 3m with PCP    Date Provider   Last Visit   5/24/2023 Nikki Alfredo MD   Next Visit   Visit date not found Nikki Alfredo MD   ED visits in past 90 days: 0        Note composed:2:47 PM 08/25/2023

## 2023-10-30 PROBLEM — N17.9 ACUTE RENAL FAILURE SUPERIMPOSED ON STAGE 3B CHRONIC KIDNEY DISEASE: Status: RESOLVED | Noted: 2022-02-04 | Resolved: 2023-10-30

## 2023-10-30 PROBLEM — N18.32 ACUTE RENAL FAILURE SUPERIMPOSED ON STAGE 3B CHRONIC KIDNEY DISEASE: Status: RESOLVED | Noted: 2022-02-04 | Resolved: 2023-10-30

## 2024-03-12 RX ORDER — ATORVASTATIN CALCIUM 40 MG/1
40 TABLET, FILM COATED ORAL
Qty: 90 TABLET | Refills: 3 | Status: SHIPPED | OUTPATIENT
Start: 2024-03-12

## 2024-08-14 DIAGNOSIS — I10 PRIMARY HYPERTENSION: ICD-10-CM

## (undated) DEVICE — OBTURATOR BLADELESS 8MM XI CLR

## (undated) DEVICE — DRAPE COLUMN DAVINCI XI

## (undated) DEVICE — COVER TIP CURVED SCISSORS XI

## (undated) DEVICE — SUPPORT ULNA NERVE PROTECTOR

## (undated) DEVICE — UNDERGLOVE BIOGEL PI SZ 6.5 LF

## (undated) DEVICE — KIT ANTIFOG W/SPONG & FLUID

## (undated) DEVICE — SET PNEUMOCLEAR HEAT HUM SE HF

## (undated) DEVICE — TOWEL OR DISP STRL BLUE 4/PK

## (undated) DEVICE — ELECTRODE REM PLYHSV RETURN 9

## (undated) DEVICE — SUT STRATAFIX 0 PDS+ 23CM 9IN

## (undated) DEVICE — NDL SAFETY 25G X 1.5 ECLIPSE

## (undated) DEVICE — SUT STRATAFIX SPIRAL 20CM

## (undated) DEVICE — TIP GRASPER FENESTRATED DISP

## (undated) DEVICE — HEADREST ROUND DISP FOAM 9IN

## (undated) DEVICE — TOWEL OR NONABSORB ADH 17X26

## (undated) DEVICE — SUT MONOCRYL 4.0 PS2 CP496G

## (undated) DEVICE — SYR 3CC LUER LOC

## (undated) DEVICE — PACK BASIC SETUP SC BR

## (undated) DEVICE — DRAPE ARM DAVINCI XI

## (undated) DEVICE — ADHESIVE DERMABOND ADVANCED

## (undated) DEVICE — NDL PNEUMO INSUFFLATI 120MM

## (undated) DEVICE — CONTAINER SPECIMEN OR STER 4OZ

## (undated) DEVICE — MANIFOLD 4 PORT

## (undated) DEVICE — TROCAR ENDOPATH XCEL 8MM 10CM

## (undated) DEVICE — SEAL UNIVERSAL 5MM-8MM XI

## (undated) DEVICE — PAD PINK TRENDELENBURG POS XL

## (undated) DEVICE — COVER LIGHT HANDLE 80/CA

## (undated) DEVICE — DRAPE ABDOMINAL TIBURON 14X11

## (undated) DEVICE — GOWN POLY REINF BRTH SLV XL

## (undated) DEVICE — SYR ONLY LUER LOCK 20CC

## (undated) DEVICE — SOL NS 1000CC

## (undated) DEVICE — GLOVE SURGICAL LATEX SZ 6.5

## (undated) DEVICE — APPLICATOR CHLORAPREP ORN 26ML

## (undated) DEVICE — CLIPPER BLADE MOD 4406 (CAREF)